# Patient Record
Sex: FEMALE | Race: WHITE | Employment: OTHER | URBAN - METROPOLITAN AREA
[De-identification: names, ages, dates, MRNs, and addresses within clinical notes are randomized per-mention and may not be internally consistent; named-entity substitution may affect disease eponyms.]

---

## 2021-09-22 ENCOUNTER — TRANSCRIBE ORDER (OUTPATIENT)
Dept: SCHEDULING | Age: 66
End: 2021-09-22

## 2021-09-22 DIAGNOSIS — M16.11 PRIMARY OSTEOARTHRITIS OF RIGHT HIP: Primary | ICD-10-CM

## 2022-01-03 ENCOUNTER — HOSPITAL ENCOUNTER (OUTPATIENT)
Dept: CT IMAGING | Age: 67
Discharge: HOME | End: 2022-01-03
Attending: ORTHOPAEDIC SURGERY
Payer: COMMERCIAL

## 2022-01-03 DIAGNOSIS — M16.11 PRIMARY OSTEOARTHRITIS OF RIGHT HIP: ICD-10-CM

## 2022-01-03 PROCEDURE — 73700 CT LOWER EXTREMITY W/O DYE: CPT

## 2022-01-24 ENCOUNTER — HOSPITAL ENCOUNTER (OUTPATIENT)
Dept: PREADMISSION TESTING | Age: 67
Discharge: HOME | End: 2022-01-24
Payer: COMMERCIAL

## 2022-01-24 VITALS
HEIGHT: 66 IN | HEART RATE: 100 BPM | TEMPERATURE: 97.7 F | DIASTOLIC BLOOD PRESSURE: 71 MMHG | RESPIRATION RATE: 20 BRPM | OXYGEN SATURATION: 99 % | SYSTOLIC BLOOD PRESSURE: 150 MMHG | WEIGHT: 195.99 LBS | BODY MASS INDEX: 31.5 KG/M2

## 2022-01-24 LAB
ABO + RH BLD: NORMAL
ALBUMIN SERPL-MCNC: 3.8 G/DL (ref 3.5–5)
ALBUMIN/GLOB SERPL: 1 {RATIO} (ref 1.1–2.2)
ALP SERPL-CCNC: 107 U/L (ref 45–117)
ALT SERPL-CCNC: 24 U/L (ref 12–78)
ANION GAP SERPL CALC-SCNC: 5 MMOL/L (ref 5–15)
APPEARANCE UR: ABNORMAL
AST SERPL-CCNC: 20 U/L (ref 15–37)
ATRIAL RATE: 115 BPM
BACTERIA URNS QL MICRO: NEGATIVE /HPF
BASOPHILS # BLD: 0 K/UL (ref 0–0.1)
BASOPHILS NFR BLD: 0 % (ref 0–1)
BILIRUB SERPL-MCNC: 0.3 MG/DL (ref 0.2–1)
BILIRUB UR QL: NEGATIVE
BLOOD GROUP ANTIBODIES SERPL: NORMAL
BUN SERPL-MCNC: 22 MG/DL (ref 6–20)
BUN/CREAT SERPL: 32 (ref 12–20)
CALCIUM SERPL-MCNC: 9.5 MG/DL (ref 8.5–10.1)
CALCULATED P AXIS, ECG09: 73 DEGREES
CALCULATED R AXIS, ECG10: 41 DEGREES
CALCULATED T AXIS, ECG11: 58 DEGREES
CHLORIDE SERPL-SCNC: 108 MMOL/L (ref 97–108)
CO2 SERPL-SCNC: 27 MMOL/L (ref 21–32)
COLOR UR: ABNORMAL
CREAT SERPL-MCNC: 0.68 MG/DL (ref 0.55–1.02)
CRP SERPL-MCNC: 0.77 MG/DL (ref 0–0.6)
DIAGNOSIS, 93000: NORMAL
DIFFERENTIAL METHOD BLD: ABNORMAL
EOSINOPHIL # BLD: 0 K/UL (ref 0–0.4)
EOSINOPHIL NFR BLD: 0 % (ref 0–7)
EPITH CASTS URNS QL MICRO: ABNORMAL /LPF
ERYTHROCYTE [DISTWIDTH] IN BLOOD BY AUTOMATED COUNT: 12.8 % (ref 11.5–14.5)
ERYTHROCYTE [SEDIMENTATION RATE] IN BLOOD: 10 MM/HR (ref 0–30)
EST. AVERAGE GLUCOSE BLD GHB EST-MCNC: 114 MG/DL
GLOBULIN SER CALC-MCNC: 3.7 G/DL (ref 2–4)
GLUCOSE SERPL-MCNC: 118 MG/DL (ref 65–100)
GLUCOSE UR STRIP.AUTO-MCNC: NEGATIVE MG/DL
HBA1C MFR BLD: 5.6 % (ref 4–5.6)
HCT VFR BLD AUTO: 37.7 % (ref 35–47)
HGB BLD-MCNC: 12.5 G/DL (ref 11.5–16)
HGB UR QL STRIP: NEGATIVE
IMM GRANULOCYTES # BLD AUTO: 0 K/UL (ref 0–0.04)
IMM GRANULOCYTES NFR BLD AUTO: 0 % (ref 0–0.5)
KETONES UR QL STRIP.AUTO: NEGATIVE MG/DL
LEUKOCYTE ESTERASE UR QL STRIP.AUTO: ABNORMAL
LYMPHOCYTES # BLD: 1.7 K/UL (ref 0.8–3.5)
LYMPHOCYTES NFR BLD: 20 % (ref 12–49)
MCH RBC QN AUTO: 30.9 PG (ref 26–34)
MCHC RBC AUTO-ENTMCNC: 33.2 G/DL (ref 30–36.5)
MCV RBC AUTO: 93.3 FL (ref 80–99)
MONOCYTES # BLD: 0.4 K/UL (ref 0–1)
MONOCYTES NFR BLD: 4 % (ref 5–13)
MUCOUS THREADS URNS QL MICRO: ABNORMAL /LPF
NEUTS SEG # BLD: 6.5 K/UL (ref 1.8–8)
NEUTS SEG NFR BLD: 76 % (ref 32–75)
NITRITE UR QL STRIP.AUTO: NEGATIVE
NRBC # BLD: 0 K/UL (ref 0–0.01)
NRBC BLD-RTO: 0 PER 100 WBC
P-R INTERVAL, ECG05: 160 MS
PH UR STRIP: 5.5 [PH] (ref 5–8)
PLATELET # BLD AUTO: 366 K/UL (ref 150–400)
PMV BLD AUTO: 9.9 FL (ref 8.9–12.9)
POTASSIUM SERPL-SCNC: 4.7 MMOL/L (ref 3.5–5.1)
PROT SERPL-MCNC: 7.5 G/DL (ref 6.4–8.2)
PROT UR STRIP-MCNC: NEGATIVE MG/DL
Q-T INTERVAL, ECG07: 314 MS
QRS DURATION, ECG06: 86 MS
QTC CALCULATION (BEZET), ECG08: 434 MS
RBC # BLD AUTO: 4.04 M/UL (ref 3.8–5.2)
RBC #/AREA URNS HPF: ABNORMAL /HPF (ref 0–5)
SODIUM SERPL-SCNC: 140 MMOL/L (ref 136–145)
SP GR UR REFRACTOMETRY: 1.02 (ref 1–1.03)
SPECIMEN EXP DATE BLD: NORMAL
UA: UC IF INDICATED,UAUC: ABNORMAL
UROBILINOGEN UR QL STRIP.AUTO: 0.2 EU/DL (ref 0.2–1)
VENTRICULAR RATE, ECG03: 115 BPM
WBC # BLD AUTO: 8.7 K/UL (ref 3.6–11)
WBC URNS QL MICRO: ABNORMAL /HPF (ref 0–4)

## 2022-01-24 PROCEDURE — 36415 COLL VENOUS BLD VENIPUNCTURE: CPT

## 2022-01-24 PROCEDURE — 86900 BLOOD TYPING SEROLOGIC ABO: CPT

## 2022-01-24 PROCEDURE — 85652 RBC SED RATE AUTOMATED: CPT

## 2022-01-24 PROCEDURE — 93005 ELECTROCARDIOGRAM TRACING: CPT

## 2022-01-24 PROCEDURE — 86140 C-REACTIVE PROTEIN: CPT

## 2022-01-24 PROCEDURE — 81001 URINALYSIS AUTO W/SCOPE: CPT

## 2022-01-24 PROCEDURE — 84466 ASSAY OF TRANSFERRIN: CPT

## 2022-01-24 PROCEDURE — 83036 HEMOGLOBIN GLYCOSYLATED A1C: CPT

## 2022-01-24 PROCEDURE — 80053 COMPREHEN METABOLIC PANEL: CPT

## 2022-01-24 PROCEDURE — 85025 COMPLETE CBC W/AUTO DIFF WBC: CPT

## 2022-01-24 RX ORDER — EFINACONAZOLE 100 MG/ML
SOLUTION TOPICAL DAILY
COMMUNITY
End: 2022-04-19 | Stop reason: ALTCHOICE

## 2022-01-24 RX ORDER — IBUPROFEN 600 MG/1
TABLET ORAL
COMMUNITY
End: 2022-01-31

## 2022-01-24 RX ORDER — BISMUTH SUBSALICYLATE 262 MG
1 TABLET,CHEWABLE ORAL DAILY
COMMUNITY

## 2022-01-24 NOTE — PERIOP NOTES
[address] , [address] [facility]   MAIN OR                                  [phone]   MAIN PRE OP                          [phone]                                                                                AMBULATORY PRE OP          [phone]  PRE-ADMISSION TESTING    [phone]   Surgery Date:  Monday 1/31/22        Is surgery arrival time given by surgeon? NO  If NO, [id number] staff will call you between 3 and 7pm the day before your surgery with your arrival time. (If your surgery is on a Monday, we will call you the Friday before.)    Call [phone] after 7pm Monday-Friday if you did not receive this call. INSTRUCTIONS BEFORE YOUR SURGERY   When You  Arrive Arrive at the [address] [facility] on the day of your surgery  Have your insurance card, photo ID, and any copayment (if needed)   Food   and   Drink NO food or drink after midnight the night before surgery    This means NO water, gum, mints, coffee, juice, etc.  No alcohol (beer, wine, liquor) 24 hours before and after surgery   Medications to   TAKE   Morning of Surgery MEDICATIONS TO TAKE THE MORNING OF SURGERY WITH A SIP OF WATER:    None    Medications  To  STOP      7 days before surgery  Non-Steroidal anti-inflammatory Drugs (NSAID's): for example, Ibuprofen (Advil, Motrin), Naproxen (Aleve)   Aspirin, if taking for pain    Herbal supplements, vitamins, and fish oil   Other:  (Pain medications not listed above, including Tylenol may be taken)   Blood  Thinners     Bathing Clothing  Jewelry  Valuables      If you shower the morning of surgery, please do not apply anything to your skin (lotions, powders, deodorant, or makeup, especially mascara)   Follow Chlor[employer] body wash instructions provided to you during PAT appointment. Begin 3 days prior to surgery.    Do not shave or trim anywhere 24 hours before surgery   Wear your hair loose or down; no pony-tails, buns, or metal hair clips   Wear loose, comfortable, clean clothes   Wear glasses instead of contacts  [address], valuables, and jewelry, including body piercings, at home   If you were given an [employer], bring it on day of surgery. Going Home - or Spending the Night  SAME-DAY SURGERY: You must have a responsible adult drive you home and stay with you 24 hours after surgery   ADMITS: If your doctor is keeping you in the hospital after surgery, leave personal belongings/luggage in your car until you have a hospital room number. Hospital discharge time is 12 noon  Drivers must be here before 12 noon unless you are told differently   Special Instructions It is now mandated that all surgical patients be tested for COVID-19 prior to surgery. Testing has to be exactly 4 days prior to surgery. Your COVID test date is  Wednesday 1/26/22 between 8:00 am and 11:00 am.       COVID testing will be performed curbside at the [address] [place] [address] Dr beltran. There will be signs leading you to the testing site. You will need to bring a photo ID with you to be swabbed. Patients are advised to self-quarantine at home after testing and prior to your surgery date. You will be notified if your results are positive.     What to watch for:   Coronavirus (COVID-19) affects different people in different ways   It also appears with a wide range of symptoms from mild to severe   Signs usually appear 2-14 days after exposure     If you develop any of the following, notify your doctor immediately:  o Fever  o Chills, with or without a shiver  o Muscle pain  o Headache  o Sore throat  o Dry cough  o New loss of taste or smell  o Tiredness      If you develop any of the following, call 911:  o Shortness of breath  o Difficulty breathing  o Chest pain  o New confusion  o Blueness of fingers and/or lips       Follow all instructions so your surgery wont be cancelled. Please, be on time. If a situation occurs and you are delayed the day of surgery, call [phone]. If your physical condition changes (like a fever, cold, flu, etc.) call your surgeon. Home medication(s) reviewed and verified via  LIST   VERBAL   during PAT appointment. The patient was contacted by    IN-PERSON    The patient verbalizes understanding of all instructions and   DOES NOT   need reinforcement.

## 2022-01-24 NOTE — H&P
History and Physical    Patient: Jarrett Arteaga MRN: [id number]  SSN: [id number]    YOB: 1955  Age: 75 y.o. Sex: female      CC: Right hip pain    Subjective:      Annalise Prieto is a 75 y.o. female referred for pre-operative evaluation by Dr. Trudi Torre for surgery on 1/31/22. Nani notes she has had right hip pain for several years. She went through PT for six months. Walking increases her pain. Pain is localized to her hip and groin. Denies numbness to leg. Has some buckling of her leg but denies falls. Sitting gives her some relief until she has to stand up. She has not been sleeping well r/t pain. The patient was evaluated in the surgeon's office and it was determined that the most appropriate plan of care is to proceed with surgical intervention. Patient's PCP Angelique Craig MD    Past Medical History:   Diagnosis Date    Anxiety     COVID-19 vaccine administered     boostered   Pfizer    White coat syndrome without diagnosis of hypertension      Past Surgical History:   Procedure Laterality Date    HX COLONOSCOPY  09/2021    HX DILATION AND CURETTAGE  1990      Family History   Problem Relation Age of Onset    Clotting Disorder Neg Hx      Social History     Tobacco Use    Smoking status: Never Smoker    Smokeless tobacco: Never Used   Substance Use Topics    Alcohol use: Yes     Comment: social    Drug use: Never      Prior to Admission medications    Medication Sig Start Date End Date Taking? Authorizing Provider   efinaconazole Ashleigh Mora) rosendo topical solution Apply  to affected area daily. Yes Provider, Historical   multivitamin (ONE A DAY) tablet Take 1 Tablet by mouth daily. Yes Provider, Historical   ibuprofen (MOTRIN) 600 mg tablet Take  by mouth every six (6) hours as needed for Pain.    Yes Provider, Historical        No Known Allergies    Review of Systems:  Constitutional: Negative for chills and fever  HENT: Negative for congestion and sore throat  Eyes: negative for blurred vision and double vision  Respiratory: Negative for cough, shortness of breath and wheezing  Mouth: Negative for loose, broken or chipped teeth. Cardiovascular: Negative for chest pain and palpitations  Gastrointestinal: Negative for abdominal pain, constipation, diarrhea and nausea  Genitourinary: Negative for dysuria and hematuria  Musculoskeletal: Right hip pain  Skin: Negative for rash, open wounds. Neurological: Negative for dizziness, tremors and headaches  Psychiatric: Anxiety    Objective:     Vitals:    01/24/22 1135   BP: (!) 150/71   Pulse: 100   Resp: 20   Temp: 97.7 °F (36.5 °C)   SpO2: 99%   Weight: 88.9 kg (195 lb 15.8 oz)   Height: 5' 6\" (1.676 m)      She has extreme white coat syndrome. She does not take anything for BP. She takes her BP regularly at home per her PCP's instructions.      Physical Exam:  General Appearance: Alert, Well Appearing and in no distress  Mental Status: Normal mood, behavior, speech and dress  Neck: Normal appearance externally  Ears: External canal no drainage  Nose: Normal external appearance and no drainage   Chest: Clear to auscultation, no wheezes, rales or rhonchi  Heart: Normal rate, regular rhythm, no murmurs, rubs, clicks or gallops  Skin: Normal color, no lesions externally  Abdomen: Not examined  Neuro: Not examined  Musculoskeletal: Gait antalgic    Recent Results (from the past 168 hour(s))   TYPE & SCREEN    Collection Time: 01/24/22 11:13 AM   Result Value Ref Range    Crossmatch Expiration 02/03/2022,2359     ABO/Rh(D) A POSITIVE     Antibody screen NEG    C REACTIVE PROTEIN, QT    Collection Time: 01/24/22 11:13 AM   Result Value Ref Range    C-Reactive protein 0.77 (H) 0.00 - 0.60 mg/dL   CBC WITH AUTOMATED DIFF    Collection Time: 01/24/22 11:13 AM   Result Value Ref Range    WBC 8.7 3.6 - 11.0 K/uL    RBC 4.04 3.80 - 5.20 M/uL    HGB 12.5 11.5 - 16.0 g/dL    HCT 37.7 35.0 - 47.0 %    MCV 93.3 80.0 - 99.0 FL    MCH 30.9 26.0 - 34.0 PG    MCHC 33.2 30.0 - 36.5 g/dL    RDW 12.8 11.5 - 14.5 %    PLATELET 575 132 - 206 K/uL    MPV 9.9 8.9 - 12.9 FL    NRBC 0.0 0  WBC    ABSOLUTE NRBC 0.00 0.00 - 0.01 K/uL    NEUTROPHILS 76 (H) 32 - 75 %    LYMPHOCYTES 20 12 - 49 %    MONOCYTES 4 (L) 5 - 13 %    EOSINOPHILS 0 0 - 7 %    BASOPHILS 0 0 - 1 %    IMMATURE GRANULOCYTES 0 0.0 - 0.5 %    ABS. NEUTROPHILS 6.5 1.8 - 8.0 K/UL    ABS. LYMPHOCYTES 1.7 0.8 - 3.5 K/UL    ABS. MONOCYTES 0.4 0.0 - 1.0 K/UL    ABS. EOSINOPHILS 0.0 0.0 - 0.4 K/UL    ABS. BASOPHILS 0.0 0.0 - 0.1 K/UL    ABS. IMM. GRANS. 0.0 0.00 - 0.04 K/UL    DF AUTOMATED     METABOLIC PANEL, COMPREHENSIVE    Collection Time: 01/24/22 11:13 AM   Result Value Ref Range    Sodium 140 136 - 145 mmol/L    Potassium 4.7 3.5 - 5.1 mmol/L    Chloride 108 97 - 108 mmol/L    CO2 27 21 - 32 mmol/L    Anion gap 5 5 - 15 mmol/L    Glucose 118 (H) 65 - 100 mg/dL    BUN 22 (H) 6 - 20 MG/DL    Creatinine 0.68 0.55 - 1.02 MG/DL    BUN/Creatinine ratio 32 (H) 12 - 20      GFR est AA >60 >60 ml/min/1.73m2    GFR est non-AA >60 >60 ml/min/1.73m2    Calcium 9.5 8.5 - 10.1 MG/DL    Bilirubin, total 0.3 0.2 - 1.0 MG/DL    ALT (SGPT) 24 12 - 78 U/L    AST (SGOT) 20 15 - 37 U/L    Alk. phosphatase 107 45 - 117 U/L    Protein, total 7.5 6.4 - 8.2 g/dL    Albumin 3.8 3.5 - 5.0 g/dL    Globulin 3.7 2.0 - 4.0 g/dL    A-G Ratio 1.0 (L) 1.1 - 2.2     HEMOGLOBIN A1C WITH EAG    Collection Time: 01/24/22 11:13 AM   Result Value Ref Range    Hemoglobin A1c 5.6 4.0 - 5.6 %    Est. average glucose 114 mg/dL   CULTURE, MRSA    Collection Time: 01/24/22 11:13 AM    Specimen: Nares; Nasal   Result Value Ref Range    Special Requests: NO SPECIAL REQUESTS      Culture result: MRSA NOT PRESENT      Culture result:        Screening of patient nares for MRSA is for surveillance purposes and, if positive, to facilitate isolation considerations in high risk settings.  It is not intended for automatic decolonization interventions per se as regimens are not sufficiently effective to warrant routine use. SED RATE (ESR)    Collection Time: 01/24/22 11:13 AM   Result Value Ref Range    Sed rate, automated 10 0 - 30 mm/hr   TRANSFERRIN    Collection Time: 01/24/22 11:13 AM   Result Value Ref Range    Transferrin 243 192 - 364 mg/dL   URINALYSIS W/ REFLEX CULTURE    Collection Time: 01/24/22 11:13 AM    Specimen: Urine   Result Value Ref Range    Color YELLOW/STRAW      Appearance CLOUDY (A) CLEAR      Specific gravity 1.019 1.003 - 1.030      pH (UA) 5.5 5.0 - 8.0      Protein Negative NEG mg/dL    Glucose Negative NEG mg/dL    Ketone Negative NEG mg/dL    Bilirubin Negative NEG      Blood Negative NEG      Urobilinogen 0.2 0.2 - 1.0 EU/dL    Nitrites Negative NEG      Leukocyte Esterase MODERATE (A) NEG      WBC 0-4 0 - 4 /hpf    RBC 0-5 0 - 5 /hpf    Epithelial cells FEW FEW /lpf    Bacteria Negative NEG /hpf    UA:UC IF INDICATED CULTURE NOT INDICATED BY UA RESULT CNI      Mucus 1+ (A) NEG /lpf   EKG, 12 LEAD, INITIAL    Collection Time: 01/24/22 11:38 AM   Result Value Ref Range    Ventricular Rate 115 BPM    Atrial Rate 115 BPM    P-R Interval 160 ms    QRS Duration 86 ms    Q-T Interval 314 ms    QTC Calculation (Bezet) 434 ms    Calculated P Axis 73 degrees    Calculated R Axis 41 degrees    Calculated T Axis 58 degrees    Diagnosis       Sinus tachycardia  Possible Left atrial enlargement  Poor R-wave Progression  Cannot rule out Anterior infarct , age undetermined  Abnormal ECG  No previous ECGs available  Confirmed by Levi Way MD., Angel De La Vega ([id number]) on 1/24/2022 4:32:36 PM           Assessment:     OA Right Hip  Pre-Operative Evaluation    Plan:     RTH  Labs and EKG reviewed.    MRSA negative      Signed By: Jan Westfall NP     January 26, 2022

## 2022-01-24 NOTE — H&P (VIEW-ONLY)
History and Physical    Patient: Álvaro Becerril MRN: [id number]  SSN: [id number]    YOB: 1955  Age: 75 y.o. Sex: female      CC: Right hip pain    Subjective:      Maykel Smalls is a 75 y.o. female referred for pre-operative evaluation by Dr. Elver Hernandez for surgery on 1/31/22. Nani notes she has had right hip pain for several years. She went through PT for six months. Walking increases her pain. Pain is localized to her hip and groin. Denies numbness to leg. Has some buckling of her leg but denies falls. Sitting gives her some relief until she has to stand up. She has not been sleeping well r/t pain. The patient was evaluated in the surgeon's office and it was determined that the most appropriate plan of care is to proceed with surgical intervention. Patient's PCP Eloy Montano MD    Past Medical History:   Diagnosis Date    Anxiety     COVID-19 vaccine administered     boostered   Pfizer    White coat syndrome without diagnosis of hypertension      Past Surgical History:   Procedure Laterality Date    HX COLONOSCOPY  09/2021    HX DILATION AND CURETTAGE  1990      Family History   Problem Relation Age of Onset    Clotting Disorder Neg Hx      Social History     Tobacco Use    Smoking status: Never Smoker    Smokeless tobacco: Never Used   Substance Use Topics    Alcohol use: Yes     Comment: social    Drug use: Never      Prior to Admission medications    Medication Sig Start Date End Date Taking? Authorizing Provider   efinaconazole Annalisa Bellefonte) rosendo topical solution Apply  to affected area daily. Yes Provider, Historical   multivitamin (ONE A DAY) tablet Take 1 Tablet by mouth daily. Yes Provider, Historical   ibuprofen (MOTRIN) 600 mg tablet Take  by mouth every six (6) hours as needed for Pain.    Yes Provider, Historical        No Known Allergies    Review of Systems:  Constitutional: Negative for chills and fever  HENT: Negative for congestion and sore throat  Eyes: negative for blurred vision and double vision  Respiratory: Negative for cough, shortness of breath and wheezing  Mouth: Negative for loose, broken or chipped teeth. Cardiovascular: Negative for chest pain and palpitations  Gastrointestinal: Negative for abdominal pain, constipation, diarrhea and nausea  Genitourinary: Negative for dysuria and hematuria  Musculoskeletal: Right hip pain  Skin: Negative for rash, open wounds. Neurological: Negative for dizziness, tremors and headaches  Psychiatric: Anxiety    Objective:     Vitals:    01/24/22 1135   BP: (!) 150/71   Pulse: 100   Resp: 20   Temp: 97.7 °F (36.5 °C)   SpO2: 99%   Weight: 88.9 kg (195 lb 15.8 oz)   Height: 5' 6\" (1.676 m)      She has extreme white coat syndrome. She does not take anything for BP. She takes her BP regularly at home per her PCP's instructions.      Physical Exam:  General Appearance: Alert, Well Appearing and in no distress  Mental Status: Normal mood, behavior, speech and dress  Neck: Normal appearance externally  Ears: External canal no drainage  Nose: Normal external appearance and no drainage   Chest: Clear to auscultation, no wheezes, rales or rhonchi  Heart: Normal rate, regular rhythm, no murmurs, rubs, clicks or gallops  Skin: Normal color, no lesions externally  Abdomen: Not examined  Neuro: Not examined  Musculoskeletal: Gait antalgic    Recent Results (from the past 168 hour(s))   TYPE & SCREEN    Collection Time: 01/24/22 11:13 AM   Result Value Ref Range    Crossmatch Expiration 02/03/2022,2359     ABO/Rh(D) A POSITIVE     Antibody screen NEG    C REACTIVE PROTEIN, QT    Collection Time: 01/24/22 11:13 AM   Result Value Ref Range    C-Reactive protein 0.77 (H) 0.00 - 0.60 mg/dL   CBC WITH AUTOMATED DIFF    Collection Time: 01/24/22 11:13 AM   Result Value Ref Range    WBC 8.7 3.6 - 11.0 K/uL    RBC 4.04 3.80 - 5.20 M/uL    HGB 12.5 11.5 - 16.0 g/dL    HCT 37.7 35.0 - 47.0 %    MCV 93.3 80.0 - 99.0 FL    MCH 30.9 26.0 - 34.0 PG    MCHC 33.2 30.0 - 36.5 g/dL    RDW 12.8 11.5 - 14.5 %    PLATELET 495 352 - 754 K/uL    MPV 9.9 8.9 - 12.9 FL    NRBC 0.0 0  WBC    ABSOLUTE NRBC 0.00 0.00 - 0.01 K/uL    NEUTROPHILS 76 (H) 32 - 75 %    LYMPHOCYTES 20 12 - 49 %    MONOCYTES 4 (L) 5 - 13 %    EOSINOPHILS 0 0 - 7 %    BASOPHILS 0 0 - 1 %    IMMATURE GRANULOCYTES 0 0.0 - 0.5 %    ABS. NEUTROPHILS 6.5 1.8 - 8.0 K/UL    ABS. LYMPHOCYTES 1.7 0.8 - 3.5 K/UL    ABS. MONOCYTES 0.4 0.0 - 1.0 K/UL    ABS. EOSINOPHILS 0.0 0.0 - 0.4 K/UL    ABS. BASOPHILS 0.0 0.0 - 0.1 K/UL    ABS. IMM. GRANS. 0.0 0.00 - 0.04 K/UL    DF AUTOMATED     METABOLIC PANEL, COMPREHENSIVE    Collection Time: 01/24/22 11:13 AM   Result Value Ref Range    Sodium 140 136 - 145 mmol/L    Potassium 4.7 3.5 - 5.1 mmol/L    Chloride 108 97 - 108 mmol/L    CO2 27 21 - 32 mmol/L    Anion gap 5 5 - 15 mmol/L    Glucose 118 (H) 65 - 100 mg/dL    BUN 22 (H) 6 - 20 MG/DL    Creatinine 0.68 0.55 - 1.02 MG/DL    BUN/Creatinine ratio 32 (H) 12 - 20      GFR est AA >60 >60 ml/min/1.73m2    GFR est non-AA >60 >60 ml/min/1.73m2    Calcium 9.5 8.5 - 10.1 MG/DL    Bilirubin, total 0.3 0.2 - 1.0 MG/DL    ALT (SGPT) 24 12 - 78 U/L    AST (SGOT) 20 15 - 37 U/L    Alk. phosphatase 107 45 - 117 U/L    Protein, total 7.5 6.4 - 8.2 g/dL    Albumin 3.8 3.5 - 5.0 g/dL    Globulin 3.7 2.0 - 4.0 g/dL    A-G Ratio 1.0 (L) 1.1 - 2.2     HEMOGLOBIN A1C WITH EAG    Collection Time: 01/24/22 11:13 AM   Result Value Ref Range    Hemoglobin A1c 5.6 4.0 - 5.6 %    Est. average glucose 114 mg/dL   CULTURE, MRSA    Collection Time: 01/24/22 11:13 AM    Specimen: Nares; Nasal   Result Value Ref Range    Special Requests: NO SPECIAL REQUESTS      Culture result: MRSA NOT PRESENT      Culture result:        Screening of patient nares for MRSA is for surveillance purposes and, if positive, to facilitate isolation considerations in high risk settings.  It is not intended for automatic decolonization interventions per se as regimens are not sufficiently effective to warrant routine use. SED RATE (ESR)    Collection Time: 01/24/22 11:13 AM   Result Value Ref Range    Sed rate, automated 10 0 - 30 mm/hr   TRANSFERRIN    Collection Time: 01/24/22 11:13 AM   Result Value Ref Range    Transferrin 243 192 - 364 mg/dL   URINALYSIS W/ REFLEX CULTURE    Collection Time: 01/24/22 11:13 AM    Specimen: Urine   Result Value Ref Range    Color YELLOW/STRAW      Appearance CLOUDY (A) CLEAR      Specific gravity 1.019 1.003 - 1.030      pH (UA) 5.5 5.0 - 8.0      Protein Negative NEG mg/dL    Glucose Negative NEG mg/dL    Ketone Negative NEG mg/dL    Bilirubin Negative NEG      Blood Negative NEG      Urobilinogen 0.2 0.2 - 1.0 EU/dL    Nitrites Negative NEG      Leukocyte Esterase MODERATE (A) NEG      WBC 0-4 0 - 4 /hpf    RBC 0-5 0 - 5 /hpf    Epithelial cells FEW FEW /lpf    Bacteria Negative NEG /hpf    UA:UC IF INDICATED CULTURE NOT INDICATED BY UA RESULT CNI      Mucus 1+ (A) NEG /lpf   EKG, 12 LEAD, INITIAL    Collection Time: 01/24/22 11:38 AM   Result Value Ref Range    Ventricular Rate 115 BPM    Atrial Rate 115 BPM    P-R Interval 160 ms    QRS Duration 86 ms    Q-T Interval 314 ms    QTC Calculation (Bezet) 434 ms    Calculated P Axis 73 degrees    Calculated R Axis 41 degrees    Calculated T Axis 58 degrees    Diagnosis       Sinus tachycardia  Possible Left atrial enlargement  Poor R-wave Progression  Cannot rule out Anterior infarct , age undetermined  Abnormal ECG  No previous ECGs available  Confirmed by Arpita Grant MD., Courtney Singh ([id number]) on 1/24/2022 4:32:36 PM           Assessment:     OA Right Hip  Pre-Operative Evaluation    Plan:     RTH  Labs and EKG reviewed.    MRSA negative      Signed By: Alix Lira NP     January 26, 2022

## 2022-01-25 LAB
BACTERIA SPEC CULT: NORMAL
BACTERIA SPEC CULT: NORMAL
SERVICE CMNT-IMP: NORMAL
TRANSFERRIN SERPL-MCNC: 243 MG/DL (ref 192–364)

## 2022-01-26 ENCOUNTER — TRANSCRIBE ORDER (OUTPATIENT)
Dept: REGISTRATION | Age: 67
End: 2022-01-26

## 2022-01-26 ENCOUNTER — HOSPITAL ENCOUNTER (OUTPATIENT)
Dept: PREADMISSION TESTING | Age: 67
Discharge: HOME | End: 2022-01-26
Payer: COMMERCIAL

## 2022-01-26 LAB
SARS-COV-2, XPLCVT: NOT DETECTED
SOURCE, COVRS: NORMAL

## 2022-01-26 PROCEDURE — U0005 INFEC AGEN DETEC AMPLI PROBE: HCPCS

## 2022-01-26 NOTE — PROGRESS NOTES
The patient was provided a virtual link to view the pre-operative Joint Replacement Class Video  A Patient Education Book specific to total hip or knee joint replacement surgery was given to the patient in [facility]. The content of the class was presented using an audio power point presentation specific for patients undergoing total hip and knee replacement surgery. Incentive spirometer and CHG bath kits were verbally reviewed. Day of surgery routine and expectations, hospital routine and expectations, nutrition, alcohol, nicotine, medications, infection control, pain management, DVT precautions and equipment, ice therapy, durable medical equipment, exercises, mobility expectations and precautions, home preparation and safety were reviewed in class video. My contact information was shared with the patient to provide further information as requested by the patient related to their upcoming surgery. Patient sent email confirmation that they viewed the joint class video.

## 2022-01-28 ENCOUNTER — ANESTHESIA EVENT (OUTPATIENT)
Dept: SURGERY | Age: 67
End: 2022-01-28
Payer: COMMERCIAL

## 2022-01-30 RX ORDER — CELECOXIB 100 MG/1
200 CAPSULE ORAL 2 TIMES DAILY
Status: CANCELLED | OUTPATIENT
Start: 2022-01-30

## 2022-01-30 RX ORDER — AMOXICILLIN 250 MG
1 CAPSULE ORAL 2 TIMES DAILY
Status: CANCELLED | OUTPATIENT
Start: 2022-01-30

## 2022-01-30 RX ORDER — OXYCODONE HYDROCHLORIDE 5 MG/1
10 TABLET ORAL
Status: CANCELLED | OUTPATIENT
Start: 2022-01-30

## 2022-01-30 RX ORDER — ACETAMINOPHEN 325 MG/1
650 TABLET ORAL EVERY 6 HOURS
Status: CANCELLED | OUTPATIENT
Start: 2022-01-31

## 2022-01-30 RX ORDER — SODIUM CHLORIDE 9 MG/ML
125 INJECTION, SOLUTION INTRAVENOUS CONTINUOUS
Status: CANCELLED | OUTPATIENT
Start: 2022-01-30 | End: 2022-01-31

## 2022-01-30 RX ORDER — ONDANSETRON 2 MG/ML
4 INJECTION INTRAMUSCULAR; INTRAVENOUS
Status: CANCELLED | OUTPATIENT
Start: 2022-01-30 | End: 2022-01-31

## 2022-01-30 RX ORDER — POLYETHYLENE GLYCOL 3350 17 G/17G
17 POWDER, FOR SOLUTION ORAL DAILY
Status: CANCELLED | OUTPATIENT
Start: 2022-01-31

## 2022-01-30 RX ORDER — BISMUTH SUBSALICYLATE 262 MG
1 TABLET,CHEWABLE ORAL DAILY
Status: CANCELLED | OUTPATIENT
Start: 2022-01-31

## 2022-01-30 RX ORDER — DIPHENHYDRAMINE HYDROCHLORIDE 50 MG/ML
12.5 INJECTION, SOLUTION INTRAMUSCULAR; INTRAVENOUS
Status: CANCELLED | OUTPATIENT
Start: 2022-01-30 | End: 2022-01-31

## 2022-01-30 RX ORDER — ASPIRIN 81 MG/1
81 TABLET ORAL 2 TIMES DAILY
Status: CANCELLED | OUTPATIENT
Start: 2022-01-30

## 2022-01-30 RX ORDER — SODIUM CHLORIDE 0.9 % (FLUSH) 0.9 %
5-40 SYRINGE (ML) INJECTION EVERY 8 HOURS
Status: CANCELLED | OUTPATIENT
Start: 2022-01-30

## 2022-01-30 RX ORDER — PREGABALIN 75 MG/1
75 CAPSULE ORAL
Status: CANCELLED | OUTPATIENT
Start: 2022-01-30

## 2022-01-30 RX ORDER — OXYCODONE HYDROCHLORIDE 5 MG/1
5 TABLET ORAL
Status: CANCELLED | OUTPATIENT
Start: 2022-01-30

## 2022-01-30 RX ORDER — SODIUM CHLORIDE 0.9 % (FLUSH) 0.9 %
5-40 SYRINGE (ML) INJECTION AS NEEDED
Status: CANCELLED | OUTPATIENT
Start: 2022-01-30

## 2022-01-30 RX ORDER — HYDROMORPHONE HYDROCHLORIDE 1 MG/ML
0.5 INJECTION, SOLUTION INTRAMUSCULAR; INTRAVENOUS; SUBCUTANEOUS
Status: CANCELLED | OUTPATIENT
Start: 2022-01-30 | End: 2022-01-31

## 2022-01-30 RX ORDER — FACIAL-BODY WIPES
10 EACH TOPICAL DAILY PRN
Status: CANCELLED | OUTPATIENT
Start: 2022-02-01

## 2022-01-30 RX ORDER — NALOXONE HYDROCHLORIDE 0.4 MG/ML
0.4 INJECTION, SOLUTION INTRAMUSCULAR; INTRAVENOUS; SUBCUTANEOUS AS NEEDED
Status: CANCELLED | OUTPATIENT
Start: 2022-01-30

## 2022-01-30 RX ORDER — FAMOTIDINE 20 MG/1
20 TABLET, FILM COATED ORAL 2 TIMES DAILY
Status: CANCELLED | OUTPATIENT
Start: 2022-01-30

## 2022-01-31 ENCOUNTER — APPOINTMENT (OUTPATIENT)
Dept: GENERAL RADIOLOGY | Age: 67
End: 2022-01-31
Attending: PHYSICIAN ASSISTANT
Payer: COMMERCIAL

## 2022-01-31 ENCOUNTER — HOSPITAL ENCOUNTER (OUTPATIENT)
Age: 67
Setting detail: OUTPATIENT SURGERY
Discharge: HOME | End: 2022-01-31
Attending: ORTHOPAEDIC SURGERY | Admitting: ORTHOPAEDIC SURGERY
Payer: COMMERCIAL

## 2022-01-31 ENCOUNTER — ANESTHESIA (OUTPATIENT)
Dept: SURGERY | Age: 67
End: 2022-01-31
Payer: COMMERCIAL

## 2022-01-31 VITALS
SYSTOLIC BLOOD PRESSURE: 131 MMHG | OXYGEN SATURATION: 100 % | TEMPERATURE: 97.7 F | HEIGHT: 66 IN | RESPIRATION RATE: 21 BRPM | WEIGHT: 194.67 LBS | HEART RATE: 107 BPM | DIASTOLIC BLOOD PRESSURE: 64 MMHG | BODY MASS INDEX: 31.29 KG/M2

## 2022-01-31 DIAGNOSIS — M16.11 PRIMARY OSTEOARTHRITIS OF RIGHT HIP: Primary | ICD-10-CM

## 2022-01-31 PROCEDURE — 77030031139 HC SUT VCRL2 J&J -A: Performed by: ORTHOPAEDIC SURGERY

## 2022-01-31 PROCEDURE — G0378 HOSPITAL OBSERVATION PER HR: HCPCS

## 2022-01-31 PROCEDURE — C1776 JOINT DEVICE (IMPLANTABLE): HCPCS | Performed by: ORTHOPAEDIC SURGERY

## 2022-01-31 PROCEDURE — 77030010507 HC ADH SKN DERMBND J&J -B: Performed by: ORTHOPAEDIC SURGERY

## 2022-01-31 PROCEDURE — 76210000050 HC AMBSU PH II REC 0.5 TO 1 HR: Performed by: ORTHOPAEDIC SURGERY

## 2022-01-31 PROCEDURE — 74011250636 HC RX REV CODE- 250/636: Performed by: NURSE ANESTHETIST, CERTIFIED REGISTERED

## 2022-01-31 PROCEDURE — 97161 PT EVAL LOW COMPLEX 20 MIN: CPT

## 2022-01-31 PROCEDURE — 74011000250 HC RX REV CODE- 250: Performed by: ORTHOPAEDIC SURGERY

## 2022-01-31 PROCEDURE — 77030040922 HC BLNKT HYPOTHRM STRY -A

## 2022-01-31 PROCEDURE — 77030035236 HC SUT PDS STRATFX BARB J&J -B: Performed by: ORTHOPAEDIC SURGERY

## 2022-01-31 PROCEDURE — 74011250637 HC RX REV CODE- 250/637: Performed by: PHYSICIAN ASSISTANT

## 2022-01-31 PROCEDURE — 74011000250 HC RX REV CODE- 250: Performed by: NURSE ANESTHETIST, CERTIFIED REGISTERED

## 2022-01-31 PROCEDURE — 77030006835 HC BLD SAW SAG STRY -B: Performed by: ORTHOPAEDIC SURGERY

## 2022-01-31 PROCEDURE — 77030038023 HC PIN FIX MAKO STRY -B: Performed by: ORTHOPAEDIC SURGERY

## 2022-01-31 PROCEDURE — 76030000021 HC AMB SURG 2 TO 2.5 HR INTENSV-TIER 1: Performed by: ORTHOPAEDIC SURGERY

## 2022-01-31 PROCEDURE — 77030002933 HC SUT MCRYL J&J -A: Performed by: ORTHOPAEDIC SURGERY

## 2022-01-31 PROCEDURE — 77030020788: Performed by: ORTHOPAEDIC SURGERY

## 2022-01-31 PROCEDURE — 77030020143 HC AIRWY LARYN INTUB CGAS -A: Performed by: STUDENT IN AN ORGANIZED HEALTH CARE EDUCATION/TRAINING PROGRAM

## 2022-01-31 PROCEDURE — 74011250636 HC RX REV CODE- 250/636: Performed by: ANESTHESIOLOGY

## 2022-01-31 PROCEDURE — 74011000258 HC RX REV CODE- 258: Performed by: NURSE ANESTHETIST, CERTIFIED REGISTERED

## 2022-01-31 PROCEDURE — 74011250636 HC RX REV CODE- 250/636: Performed by: PHYSICIAN ASSISTANT

## 2022-01-31 PROCEDURE — 74011250637 HC RX REV CODE- 250/637: Performed by: ANESTHESIOLOGY

## 2022-01-31 PROCEDURE — 72170 X-RAY EXAM OF PELVIS: CPT

## 2022-01-31 PROCEDURE — 77030036660

## 2022-01-31 PROCEDURE — 2709999900 HC NON-CHARGEABLE SUPPLY: Performed by: ORTHOPAEDIC SURGERY

## 2022-01-31 PROCEDURE — 76210000038 HC AMBSU PH I REC 2.5 TO 3 HR: Performed by: ORTHOPAEDIC SURGERY

## 2022-01-31 PROCEDURE — 77030041075 HC DRSG AG OPTIFRM MDII -B: Performed by: ORTHOPAEDIC SURGERY

## 2022-01-31 PROCEDURE — 76060000064 HC AMB SURG ANES 2 TO 2.5 HR: Performed by: ORTHOPAEDIC SURGERY

## 2022-01-31 PROCEDURE — 77030040361 HC SLV COMPR DVT MDII -B

## 2022-01-31 PROCEDURE — 74011000250 HC RX REV CODE- 250: Performed by: PHYSICIAN ASSISTANT

## 2022-01-31 PROCEDURE — 97116 GAIT TRAINING THERAPY: CPT | Performed by: PHYSICAL THERAPIST

## 2022-01-31 PROCEDURE — 77030029821: Performed by: ORTHOPAEDIC SURGERY

## 2022-01-31 PROCEDURE — 77030018723 HC ELCTRD BLD COVD -A: Performed by: ORTHOPAEDIC SURGERY

## 2022-01-31 PROCEDURE — 77030029829 HC TIB INST CKPNT DISP STRY -B: Performed by: ORTHOPAEDIC SURGERY

## 2022-01-31 DEVICE — CERAMIC V40 FEMORAL HEAD
Type: IMPLANTABLE DEVICE | Site: HIP | Status: FUNCTIONAL
Brand: BIOLOX

## 2022-01-31 DEVICE — 127 DEGREE NECK ANGLE HIP STEM
Type: IMPLANTABLE DEVICE | Site: HIP | Status: FUNCTIONAL
Brand: ACCOLADE

## 2022-01-31 DEVICE — HIP H2 TOT ADV OTHER HD -- IMPL CAPPED H2: Type: IMPLANTABLE DEVICE | Status: FUNCTIONAL

## 2022-01-31 DEVICE — TRIDENT II TRITANIUM CLUSTER 52E
Type: IMPLANTABLE DEVICE | Site: HIP | Status: FUNCTIONAL
Brand: TRIDENT II

## 2022-01-31 DEVICE — TRIDENT X3 0 DEGREE POLYETHYLENE INSERT
Type: IMPLANTABLE DEVICE | Site: HIP | Status: FUNCTIONAL
Brand: TRIDENT X3 INSERT

## 2022-01-31 RX ORDER — OXYCODONE HYDROCHLORIDE 5 MG/1
5 TABLET ORAL
Qty: 30 TABLET | Refills: 0 | Status: SHIPPED | OUTPATIENT
Start: 2022-01-31 | End: 2022-02-17

## 2022-01-31 RX ORDER — ACETAMINOPHEN 325 MG/1
975 TABLET ORAL ONCE
Status: COMPLETED | OUTPATIENT
Start: 2022-01-31 | End: 2022-01-31

## 2022-01-31 RX ORDER — OXYCODONE HYDROCHLORIDE 5 MG/1
10 TABLET ORAL
Status: COMPLETED | OUTPATIENT
Start: 2022-01-31 | End: 2022-01-31

## 2022-01-31 RX ORDER — PHENYLEPHRINE HCL IN 0.9% NACL 0.4MG/10ML
SYRINGE (ML) INTRAVENOUS AS NEEDED
Status: DISCONTINUED | OUTPATIENT
Start: 2022-01-31 | End: 2022-01-31 | Stop reason: HOSPADM

## 2022-01-31 RX ORDER — PREGABALIN 75 MG/1
75 CAPSULE ORAL ONCE
Status: COMPLETED | OUTPATIENT
Start: 2022-01-31 | End: 2022-01-31

## 2022-01-31 RX ORDER — ACETAMINOPHEN 500 MG
975 TABLET ORAL
Qty: 60 TABLET | Refills: 1 | Status: SHIPPED | OUTPATIENT
Start: 2022-01-31 | End: 2022-04-19

## 2022-01-31 RX ORDER — SODIUM CHLORIDE, SODIUM LACTATE, POTASSIUM CHLORIDE, CALCIUM CHLORIDE 600; 310; 30; 20 MG/100ML; MG/100ML; MG/100ML; MG/100ML
125 INJECTION, SOLUTION INTRAVENOUS CONTINUOUS
Status: DISCONTINUED | OUTPATIENT
Start: 2022-01-31 | End: 2022-01-31 | Stop reason: HOSPADM

## 2022-01-31 RX ORDER — FLUMAZENIL 0.1 MG/ML
0.2 INJECTION INTRAVENOUS
Status: DISCONTINUED | OUTPATIENT
Start: 2022-01-31 | End: 2022-01-31 | Stop reason: HOSPADM

## 2022-01-31 RX ORDER — KETOROLAC TROMETHAMINE 30 MG/ML
15 INJECTION, SOLUTION INTRAMUSCULAR; INTRAVENOUS
Status: DISCONTINUED | OUTPATIENT
Start: 2022-01-31 | End: 2022-01-31 | Stop reason: HOSPADM

## 2022-01-31 RX ORDER — FENTANYL CITRATE 50 UG/ML
INJECTION, SOLUTION INTRAMUSCULAR; INTRAVENOUS AS NEEDED
Status: DISCONTINUED | OUTPATIENT
Start: 2022-01-31 | End: 2022-01-31 | Stop reason: HOSPADM

## 2022-01-31 RX ORDER — KETAMINE HYDROCHLORIDE 10 MG/ML
INJECTION, SOLUTION INTRAMUSCULAR; INTRAVENOUS AS NEEDED
Status: DISCONTINUED | OUTPATIENT
Start: 2022-01-31 | End: 2022-01-31 | Stop reason: HOSPADM

## 2022-01-31 RX ORDER — NALOXONE HYDROCHLORIDE 0.4 MG/ML
0.2 INJECTION, SOLUTION INTRAMUSCULAR; INTRAVENOUS; SUBCUTANEOUS
Status: DISCONTINUED | OUTPATIENT
Start: 2022-01-31 | End: 2022-01-31 | Stop reason: HOSPADM

## 2022-01-31 RX ORDER — IBUPROFEN 800 MG/1
800 TABLET ORAL
Qty: 50 TABLET | Refills: 2 | Status: SHIPPED | OUTPATIENT
Start: 2022-01-31 | End: 2022-04-19

## 2022-01-31 RX ORDER — LIDOCAINE HYDROCHLORIDE 10 MG/ML
0.1 INJECTION, SOLUTION EPIDURAL; INFILTRATION; INTRACAUDAL; PERINEURAL AS NEEDED
Status: DISCONTINUED | OUTPATIENT
Start: 2022-01-31 | End: 2022-01-31 | Stop reason: HOSPADM

## 2022-01-31 RX ORDER — EPHEDRINE SULFATE/0.9% NACL/PF 50 MG/5 ML
SYRINGE (ML) INTRAVENOUS AS NEEDED
Status: DISCONTINUED | OUTPATIENT
Start: 2022-01-31 | End: 2022-01-31 | Stop reason: HOSPADM

## 2022-01-31 RX ORDER — ONDANSETRON 2 MG/ML
INJECTION INTRAMUSCULAR; INTRAVENOUS AS NEEDED
Status: DISCONTINUED | OUTPATIENT
Start: 2022-01-31 | End: 2022-01-31 | Stop reason: HOSPADM

## 2022-01-31 RX ORDER — DIPHENHYDRAMINE HYDROCHLORIDE 50 MG/ML
12.5 INJECTION, SOLUTION INTRAMUSCULAR; INTRAVENOUS AS NEEDED
Status: DISCONTINUED | OUTPATIENT
Start: 2022-01-31 | End: 2022-01-31 | Stop reason: HOSPADM

## 2022-01-31 RX ORDER — OXYCODONE HCL 10 MG/1
10 TABLET, FILM COATED, EXTENDED RELEASE ORAL ONCE
Status: COMPLETED | OUTPATIENT
Start: 2022-01-31 | End: 2022-01-31

## 2022-01-31 RX ORDER — HYDROMORPHONE HYDROCHLORIDE 1 MG/ML
.25-1 INJECTION, SOLUTION INTRAMUSCULAR; INTRAVENOUS; SUBCUTANEOUS
Status: DISCONTINUED | OUTPATIENT
Start: 2022-01-31 | End: 2022-01-31 | Stop reason: HOSPADM

## 2022-01-31 RX ORDER — ONDANSETRON 8 MG/1
4 TABLET, ORALLY DISINTEGRATING ORAL
Qty: 30 TABLET | Refills: 0 | Status: SHIPPED | OUTPATIENT
Start: 2022-01-31 | End: 2022-04-19

## 2022-01-31 RX ORDER — FENTANYL CITRATE 50 UG/ML
25 INJECTION, SOLUTION INTRAMUSCULAR; INTRAVENOUS
Status: DISCONTINUED | OUTPATIENT
Start: 2022-01-31 | End: 2022-01-31 | Stop reason: HOSPADM

## 2022-01-31 RX ORDER — TRAMADOL HYDROCHLORIDE 50 MG/1
50 TABLET ORAL
Qty: 28 TABLET | Refills: 0 | Status: SHIPPED | OUTPATIENT
Start: 2022-01-31 | End: 2022-02-17

## 2022-01-31 RX ORDER — ASPIRIN 81 MG/1
81 TABLET ORAL 2 TIMES DAILY
Qty: 60 TABLET | Refills: 0 | Status: SHIPPED | OUTPATIENT
Start: 2022-01-31 | End: 2022-04-19

## 2022-01-31 RX ORDER — POVIDONE-IODINE 10 %
SOLUTION, NON-ORAL TOPICAL AS NEEDED
Status: DISCONTINUED | OUTPATIENT
Start: 2022-01-31 | End: 2022-01-31 | Stop reason: HOSPADM

## 2022-01-31 RX ORDER — MIDAZOLAM HYDROCHLORIDE 1 MG/ML
INJECTION, SOLUTION INTRAMUSCULAR; INTRAVENOUS AS NEEDED
Status: DISCONTINUED | OUTPATIENT
Start: 2022-01-31 | End: 2022-01-31 | Stop reason: HOSPADM

## 2022-01-31 RX ORDER — DEXAMETHASONE SODIUM PHOSPHATE 4 MG/ML
INJECTION, SOLUTION INTRA-ARTICULAR; INTRALESIONAL; INTRAMUSCULAR; INTRAVENOUS; SOFT TISSUE AS NEEDED
Status: DISCONTINUED | OUTPATIENT
Start: 2022-01-31 | End: 2022-01-31 | Stop reason: HOSPADM

## 2022-01-31 RX ORDER — PROPOFOL 10 MG/ML
INJECTION, EMULSION INTRAVENOUS AS NEEDED
Status: DISCONTINUED | OUTPATIENT
Start: 2022-01-31 | End: 2022-01-31 | Stop reason: HOSPADM

## 2022-01-31 RX ORDER — CELECOXIB 100 MG/1
100 CAPSULE ORAL ONCE
Status: COMPLETED | OUTPATIENT
Start: 2022-01-31 | End: 2022-01-31

## 2022-01-31 RX ORDER — LIDOCAINE HYDROCHLORIDE 20 MG/ML
INJECTION, SOLUTION EPIDURAL; INFILTRATION; INTRACAUDAL; PERINEURAL AS NEEDED
Status: DISCONTINUED | OUTPATIENT
Start: 2022-01-31 | End: 2022-01-31 | Stop reason: HOSPADM

## 2022-01-31 RX ADMIN — DEXAMETHASONE SODIUM PHOSPHATE 10 MG: 4 INJECTION, SOLUTION INTRAMUSCULAR; INTRAVENOUS at 09:02

## 2022-01-31 RX ADMIN — Medication 120 MCG: at 08:16

## 2022-01-31 RX ADMIN — PREGABALIN 75 MG: 75 CAPSULE ORAL at 06:02

## 2022-01-31 RX ADMIN — MIDAZOLAM 2 MG: 1 INJECTION, SOLUTION INTRAMUSCULAR; INTRAVENOUS at 07:26

## 2022-01-31 RX ADMIN — PROPOFOL 50 MG: 10 INJECTION, EMULSION INTRAVENOUS at 07:53

## 2022-01-31 RX ADMIN — Medication 80 MCG: at 08:05

## 2022-01-31 RX ADMIN — Medication 10 MG: at 07:58

## 2022-01-31 RX ADMIN — LIDOCAINE HYDROCHLORIDE 40 MG: 20 INJECTION, SOLUTION INTRAVENOUS at 07:34

## 2022-01-31 RX ADMIN — ONDANSETRON HYDROCHLORIDE 4 MG: 2 SOLUTION INTRAMUSCULAR; INTRAVENOUS at 09:02

## 2022-01-31 RX ADMIN — CEFAZOLIN SODIUM 2 G: 1 POWDER, FOR SOLUTION INTRAMUSCULAR; INTRAVENOUS at 07:38

## 2022-01-31 RX ADMIN — Medication 80 MCG: at 07:47

## 2022-01-31 RX ADMIN — SODIUM CHLORIDE 1 G: 9 INJECTION, SOLUTION INTRAVENOUS at 07:40

## 2022-01-31 RX ADMIN — FENTANYL CITRATE 25 MCG: 50 INJECTION INTRAMUSCULAR; INTRAVENOUS at 08:14

## 2022-01-31 RX ADMIN — SODIUM CHLORIDE, POTASSIUM CHLORIDE, SODIUM LACTATE AND CALCIUM CHLORIDE: 600; 310; 30; 20 INJECTION, SOLUTION INTRAVENOUS at 08:56

## 2022-01-31 RX ADMIN — KETAMINE HYDROCHLORIDE 10 MG: 10 INJECTION INTRAMUSCULAR; INTRAVENOUS at 08:49

## 2022-01-31 RX ADMIN — Medication 80 MCG: at 09:21

## 2022-01-31 RX ADMIN — PROPOFOL 200 MG: 10 INJECTION, EMULSION INTRAVENOUS at 07:34

## 2022-01-31 RX ADMIN — Medication 10 MG: at 08:53

## 2022-01-31 RX ADMIN — SODIUM CHLORIDE, POTASSIUM CHLORIDE, SODIUM LACTATE AND CALCIUM CHLORIDE: 600; 310; 30; 20 INJECTION, SOLUTION INTRAVENOUS at 07:47

## 2022-01-31 RX ADMIN — Medication 120 MCG: at 08:24

## 2022-01-31 RX ADMIN — SODIUM CHLORIDE, POTASSIUM CHLORIDE, SODIUM LACTATE AND CALCIUM CHLORIDE 125 ML/HR: 600; 310; 30; 20 INJECTION, SOLUTION INTRAVENOUS at 06:17

## 2022-01-31 RX ADMIN — OXYCODONE HYDROCHLORIDE 10 MG: 10 TABLET, FILM COATED, EXTENDED RELEASE ORAL at 06:03

## 2022-01-31 RX ADMIN — Medication 10 MG: at 07:51

## 2022-01-31 RX ADMIN — FENTANYL CITRATE 50 MCG: 50 INJECTION INTRAMUSCULAR; INTRAVENOUS at 07:53

## 2022-01-31 RX ADMIN — CELECOXIB 100 MG: 100 CAPSULE ORAL at 06:03

## 2022-01-31 RX ADMIN — FENTANYL CITRATE 50 MCG: 50 INJECTION INTRAMUSCULAR; INTRAVENOUS at 07:33

## 2022-01-31 RX ADMIN — Medication 80 MCG: at 08:32

## 2022-01-31 RX ADMIN — FENTANYL CITRATE 25 MCG: 50 INJECTION INTRAMUSCULAR; INTRAVENOUS at 08:45

## 2022-01-31 RX ADMIN — OXYCODONE 10 MG: 5 TABLET ORAL at 11:59

## 2022-01-31 RX ADMIN — Medication 10 MG: at 08:55

## 2022-01-31 RX ADMIN — ACETAMINOPHEN 975 MG: 325 TABLET ORAL at 06:03

## 2022-01-31 RX ADMIN — KETAMINE HYDROCHLORIDE 20 MG: 10 INJECTION INTRAMUSCULAR; INTRAVENOUS at 07:53

## 2022-01-31 RX ADMIN — SODIUM CHLORIDE 1 G: 9 INJECTION, SOLUTION INTRAVENOUS at 09:07

## 2022-01-31 NOTE — INTERVAL H&P NOTE
Update History & Physical    The Patient's History and Physical of January 31,    was reviewed with the patient and I examined the patient. There was no change. The surgical site was confirmed by the patient and me. Plan:  The risk, benefits, expected outcome, and alternative to the recommended procedure have been discussed with the patient. Patient understands and wants to proceed with the procedure.     Electronically signed by Chang Dumont MD on 1/31/2022 at 7:06 AM

## 2022-01-31 NOTE — PERIOP NOTES
Family / caregiver update provided, questions answered. Pending PT in about 1.5 hr. Will bring to bedside at  That time for PT and d/c instructions upon PT completion.

## 2022-01-31 NOTE — OP NOTES
OPERATIVE REPORT     Admit Date: 1/31/2022  Admit Diagnosis: Primary osteoarthritis of right hip [M16.11]  Preoperative Diagnosis: Primary osteoarthritis of right hip [M16.11]  Postoperative Diagnosis: Primary osteoarthritis of right hip [M16.11]    Procedure: Procedure(s):  RIGHT TOTAL HIP ARTHROPLASTY, DIRECT ANTERIOR, MAKOPLASTY (FAST TRACK)  Surgeon: Manjula Rubio MD  Assistant(s): Jone Back PA-C  Anesthesia: Regional   Estimated Blood Loss: 300cc  Specimens: * No specimens in log *   Complications: None        INDICATIONS:    The patient is a 75 y.o., female who has complained of a long history of right hip pain / groin pain. The patient has failed conservative treatment to include medical management / therapy and presents for definitive operative care. Informed consent was obtained including a discussion of the risks and benefits, which include, but are not limited to, bleeding, infection, neurovascular damage, wound complications, pain and stiffness in the hip, periprosthetic loosening, fracture, dislocation and venous thrombo-embolic disease, the patient consented for the procedure. DESCRIPTION OF PROCEDURE:       The proper side and hip were identified and signed in the preoperative holding area. All questions were answered. The patient was given Ancef preop for an antibiotic. After adequate anesthesia, the patient was positioned supine on the [place] table, the feet were well padded in the boots. The hip was then prepped and draped in sterile fashion. The contralateral tracking array was placed. A direct anterior approach was used through skin and the tensor fascia. The interval between the Tensor Fascia and Sartorius was used and retractors were placed in an atraumatic fashion. The lateral femoral circumflex artery and vein were identified and coagulated. The proximal and distal capsule was identified and excised. A tracking array was placed in the proximal greater trochanter.  The leg length and offset were verified with the MAKOplasty probe. A standard neck cut was made according to the implant geometry. The femoral head was removed. Further soft tissue was debrided and medial capsule along the neck cut released. Acetabular exposure was then obtained and the labrum was excised along with the foveal contents. Registration and acetabular mapping was performed. Once registration was complete and all soft tissues were removed the planned acetabular reamer was used in conjunction with the MAKOplasty arm. Remaining bone and osteophyte was removed, cysts were bone grafted as necessary. The final cup was then impacted in place with haptic guidance and rigid robotic arm assistance. There were no screws placed. The liner was then placed. The femur was then exposed, residual soft tissue was removed and the box osteotome was used along with blunt rounded canal finder. Sequential broaching was started with the opening broach and then the zero broach and broached up to the final implant size. The final implant was placed and a trial head was placed then the reduction was performed. Leg lengths and offset were verified. The final head was then impacted in place and thorough head and neck irrigation, the leg length was verified again. The femoral tracker was removed. The wound was copiously irrigated with 1L of dilute betadine solution 5%. The interval between the tensor and Sartorius was closed with 0-Vicryl and running stratafix suture. The sub-Q was closed with with 2-0 Vicryl, 4-0 monocryl and dermabond. The pin sites were closed with 4-0 Monocryl. A sterile dressing was applied. The patient was awoken from anesthesia and taken to the recovery room in a stable condiition. OPERATIVE FINDINGS : severe right hip OA noted. IMPLANTS :   Implant Name Type Inv.  Item Serial No.  Lot No. LRB No. Used Action   INSERT ACET E 0 DEG 36 MM HIP X3 TRIDENT - SN/A  INSERT ACET E 0 DEG 36 MM HIP X3 TRIDENT N/A [employer] HOW_ [id number] Right 1 Implanted   SHELL ACET CLUS H 52E TRTANIUM -- TRIDENT II - SN/A  SHELL ACET CLUS H 52E TRTANIUM -- TRIDENT II N/A [employer] HOW_WD [id number] Right 1 Implanted       POST OPERATIVE CONSIDERATIONS :   WBAT    JUSTIFICATION FOR SURGICAL ASSISTANT:   Surgical Assistant, was requried and necessary in this case, to help with soft tissue retraction, extremity positioning, equiment management, implant management, and wound closure.       Blank Luna MD

## 2022-01-31 NOTE — ANESTHESIA PREPROCEDURE EVALUATION
Relevant Problems   No relevant active problems       Anesthetic History   No history of anesthetic complications            Review of Systems / Medical History  Patient summary reviewed, nursing notes reviewed and pertinent labs reviewed    Pulmonary          Undiagnosed apnea    Pertinent negatives: No COPD, asthma and recent URI     Neuro/Psych   Within defined limits           Cardiovascular    Hypertension              Exercise tolerance: >4 METS     GI/Hepatic/Renal                Endo/Other        Obesity and arthritis     Other Findings              Physical Exam    Airway  Mallampati: II  TM Distance: 4 - 6 cm  Neck ROM: normal range of motion   Mouth opening: Normal     Cardiovascular  Regular rate and rhythm,  S1 and S2 normal,  no murmur, click, rub, or gallop             Dental    Dentition: Lower dentition intact and Upper dentition intact     Pulmonary  Breath sounds clear to auscultation               Abdominal         Other Findings            Anesthetic Plan    ASA: 2  Anesthesia type: general          Induction: Intravenous  Anesthetic plan and risks discussed with: Patient and Spouse

## 2022-01-31 NOTE — ANESTHESIA POSTPROCEDURE EVALUATION
Procedure(s):  RIGHT TOTAL HIP ARTHROPLASTY, DIRECT ANTERIOR, MAKOPLASTY (FAST TRACK). general    Anesthesia Post Evaluation      Multimodal analgesia: multimodal analgesia used between 6 hours prior to anesthesia start to PACU discharge  Patient location during evaluation: PACU  Patient participation: complete - patient participated  Level of consciousness: awake and alert  Pain management: adequate  Airway patency: patent  Anesthetic complications: no  Cardiovascular status: acceptable  Respiratory status: acceptable  Hydration status: acceptable  Post anesthesia nausea and vomiting:  none  Final Post Anesthesia Temperature Assessment:  Normothermia (36.0-37.5 degrees C)      INITIAL Post-op Vital signs:   Vitals Value Taken Time   /64 01/31/22 1230   Temp 36.5 °C (97.7 °F) 01/31/22 0955   Pulse 95 01/31/22 1147   Resp 12 01/31/22 1147   SpO2 98 % 01/31/22 1147   Vitals shown include unvalidated device data.

## 2022-01-31 NOTE — PERIOP NOTES
POST ANESTHESIA CARE    DISCHARGE / TRANSFER NOTE  Nani Ash Courts was:    [x] discharged        via   [x] Wheelchair          to [x] Private Vehicle     [] transferred   [] Carried   [] Taxi / Vehicle \"for Hire\"  [] Walk out  [] Ambulance / Medical Transportation   [] Stretcher  [] Hospital room _**_          [] Bed      Patient was escorted by:      [x] Nurse   [] Volunteer [] Transporter / Technician  [] Parent      [] Spouse / Family /      Patient verbalized     [x] appreciation and was very pleased with care received   [] frustration with care received       throughout their stay. Patient was discharged in     [x] pleasant mood  [] sad mood  [] mad mood . Pain at discharge/transfer was      0  /10. Discharge, medication and follow-up instructions were verbalized as understood prior to discharge  (if applicable for same-day procedures being discharged.)    All personal belongings have been returned to patient, and patient/family verbally confirm receiving belongings as all present.

## 2022-01-31 NOTE — PROGRESS NOTES
PHYSICAL THERAPY EVALUATION/DISCHARGE  Patient: Eunice Jean (63 y.o. female)  Date: 1/31/2022  Primary Diagnosis: Primary osteoarthritis of right hip [M16.11]  Procedure(s) (LRB):  RIGHT TOTAL HIP ARTHROPLASTY, DIRECT ANTERIOR, MAKOPLASTY (FAST TRACK) (Right) Day of Surgery   Precautions:   Fall,WBAT,Total hip      ASSESSMENT  Based on the objective data described below, the patient presents with decreased ROM, strength, and functional ambulation from her baseline of independence following admission for right LAVERNE, anterior makoplasty. Patient was seen in PACU due to being on fast track for same day discharge.  at bedside. PT educated them regarding hip precautions and exercises and handout provided. She ambulated 20 feet and went up and down 4 steps with CGA of 2. Handouts and gait belt provided for . Vitals stable. She is cleared for discharge from a PT standpoint. .    Functional Outcome Measure: The patient scored 60/100 on the Barthel outcome measure. Other factors to consider for discharge: none     Further skilled acute physical therapy is not indicated at this time. PLAN :  Recommendation for discharge: (in order for the patient to meet his/her long term goals)  Outpatient physical therapy follow up recommended for LAVERNE    This discharge recommendation:  Has not yet been discussed the attending provider and/or case management    IF patient discharges home will need the following DME: patient owns DME required for discharge       SUBJECTIVE:   Patient stated I can't believe that it doesn't hurt! Sobia Jacinto    OBJECTIVE DATA SUMMARY:   HISTORY:    Past Medical History:   Diagnosis Date    Anxiety     COVID-19 vaccine administered     boostered   [employer]    White coat syndrome without diagnosis of hypertension      Past Surgical History:   Procedure Laterality Date    HX COLONOSCOPY  09/2021    HX DILATION AND CURETTAGE  1990       Prior level of function: independent  Personal factors and/or comorbidities impacting plan of care: none    Home Situation  Rails to Enter: Yes  Hand Rails : Right  Current DME Used/Available at Home: Walker, rolling,Cane, straight,Raised toilet seat,Grab bars  Tub or Shower Type: Shower    EXAMINATION/PRESENTATION/DECISION MAKING:   Critical Behavior:  Neurologic State: Alert  Orientation Level: Appropriate for age,Oriented X4  Cognition: Appropriate decision making,Appropriate for age attention/concentration,Appropriate safety awareness,Follows commands,Recognition of people/places  Safety/Judgement: Good awareness of safety precautions  Hearing:     Skin:  Not observed    Range Of Motion:  AROM: Within functional limits (RLE less due to surgery)                       Strength:    Strength: Within functional limits (RLE less due to surgery)                    Tone & Sensation:   Tone: Normal              Sensation: Intact                    Functional Mobility:  Bed Mobility:     Supine to Sit: Stand-by assistance; Additional time     Scooting: Stand-by assistance  Transfers:  Sit to Stand: Contact guard assistance;Assist x2  Stand to Sit: Contact guard assistance                       Balance:   Sitting: Intact  Standing: Intact; With support  Ambulation/Gait Training:  Distance (ft): 20 Feet (ft)  Assistive Device: Gait belt;Walker, rolling  Ambulation - Level of Assistance: Assist x2;Contact guard assistance        Gait Abnormalities: Step to gait  Right Side Weight Bearing: As tolerated                                   Stairs:  Number of Stairs Trained: 4  Stairs - Level of Assistance: Contact guard assistance;Assist X2   Rail Use: Right  (cane on left)    Therapeutic Exercises:    Ankle pumps, quad and heel and gluteal sets, heel slides, internal rotation, hip abduction    Functional Measure:  Barthel Index:    Bathin  Bladder: 10  Bowels: 10  Groomin  Dressin  Feeding: 10  Mobility: 5  Stairs: 5  Toilet Use: 5  Transfer (Bed to Chair and Back): 5  Total: 60/100       The Barthel ADL Index: Guidelines  1. The index should be used as a record of what a patient does, not as a record of what a patient could do. 2. The main aim is to establish degree of independence from any help, physical or verbal, however minor and for whatever reason. 3. The need for supervision renders the patient not independent. 4. A patient's performance should be established using the best available evidence. Asking the patient, friends/relatives and nurses are the usual sources, but direct observation and common sense are also important. However direct testing is not needed. 5. Usually the patient's performance over the preceding 24-48 hours is important, but occasionally longer periods will be relevant. 6. Middle categories imply that the patient supplies over 50 per cent of the effort. 7. Use of aids to be independent is allowed. Score Interpretation (from [address] [facility] 83)    Independent   60-79 Minimally independent   40-59 Partially dependent   20-39 Very dependent   <20 Totally dependent     -Emelyn Leblanc., Barthel, D.W. (1965). Functional evaluation: the Barthel Index. [address] [facility] ([address] [place] [address] (1997). The Barthel activities of daily living index: self-reporting versus actual performance in the old (> or = 75 years). Journal [address] 457), [address] [facility], ..Estrellita, Daniele Morel., Chiquita Lobato. (1999). Measuring the change in disability after inpatient rehabilitation; comparison of the responsiveness of the Barthel Index and Functional [place] Measure. Journal of Neurology, Neurosurgery, and Psychiatry, 66(4), 528-156. VISHAL Neal.TRIPP, SAMIR Rosen, & Keenan Chapin, MEstrellitaA. (2004) Assessment of post-stroke quality of life in cost-effectiveness studies: The usefulness of the Barthel Index and the EuroQoL-5D.  Quality of Life Research, 15, 329-63          Physical Therapy Evaluation Charge Determination   History Examination Presentation Decision-Making   LOW Complexity : Zero comorbidities / personal factors that will impact the outcome / POC LOW Complexity : 1-2 Standardized tests and measures addressing body structure, function, activity limitation and / or participation in recreation  LOW Complexity : Stable, uncomplicated  LOW Complexity : FOTO score of       Based on the above components, the patient evaluation is determined to be of the following complexity level: LOW     Pain Rating:  none    Activity Tolerance:   Good      After treatment patient left in no apparent distress:   Sitting in chair and Caregiver / family present    COMMUNICATION/EDUCATION:   The patients plan of care was discussed with: Registered nurse. Fall prevention education was provided and the patient/caregiver indicated understanding. and Patient/family agree to work toward stated goals and plan of care.     Thank you for this referral.  Xiao Anuglo, PT   Time Calculation: 26 mins

## 2022-01-31 NOTE — DISCHARGE INSTRUCTIONS
TOTAL HIP DISCHARGE INSTRUCTIONS    Patient: Odilia Louis MRN: [id number]  SSN: [id number]              Please take the time to review the following instructions before you leave the hospital and use them as guidelines during your recovery from surgery. If you have any questions you may contact my office at [phone]  After business hours or during the weekend you can contact me through [address] or text / call at [phone] (cell phone) for emergency's. Please use the office number during regular business hours. SPECIAL INSTRUCTIONS :   1. Do not bend greater than 90 degrees at the hip for 4 weeks following your discharge  2. Avoid exercises or activities which bring the leg out or away from the mid-line of the body. The surgical repair involves this muscle and it will require 4 weeks to heal. You may disregard these instructions for a direct anterior approach. 3. You may walk as tolerated and are encouraged to work daily on progressing your activities with a walker initially. 4. You may transition to a cane for walking 5-7 days from surgery once you feel safe. You may use a walker for longer periods if you feel unstable. 5. Call my office for routine questions M-F at [phone]. You may contact me directly through [address] [place] [address] if there are specific questions or text / call using my cell number [phone]. DRESSING :     Post-op Dressings : This should be removed by physical therapy or you may remove this yourself 7 days after the date of your surgery. If there is no drainage, then a simple dressing may be used or no dressing at all. Other dressing options can be purchased over the counter at a local pharmacy or medical supply vendor. A porous adhesive dressing such as pictured above can be purchased at a local [employer] or [employer]. You only need to keep the incision covered for 7 days after showers.  A dressing may be used for longer if there are issues with clothing clinging to the incision. Showering/ Bathing: You may shower with the Post-op dressing in place. This is left in place for 7 days following discharge from the hospital. If your incision is dry without drainage you may shower following your discharge home. After 7 days your dressing should be removed for showering. It is fine to have water run over the incision. Do not vigorously scrub your incision. Apply a clean, dry dressing after you have dried your incision. Do not take a bath or get into a swimming pool / [employer] until you follow up with Dr. Aram Wills. Do not soak your incision under water. If there is continued drainage or you are concerned contact Dr Sean Welch office prior to showering [phone] ext [phone] . Diet:  You may advance to your regular diet as tolerated. Increase your clear liquid intake for the next 2-3 days. Medication:      1. You will be given prescriptions for pain medication when you are discharged from the hospital. The side effects of these medications can be substantial and the narcotic medications are not mandatory. You may substitute these medications with Tylenol or Alleve / Motrin. 2. Please use the medications as prescribed. Pain medications may cause constipation- Colace twice daily and Miralax one scoop daily while taking the narcotic medication should help prevent constipation. Please discuss with your local pharmacist regarding increasing this dosage if constipation persists. Other possible side effects of pain medication are dizziness, headache, nausea, vomiting, and urinary retention. Discontinue the pain medication if you develop itching, rash, shortness of breath, or difficulties swallowing. If these symptoms become severe or are not relieved by discontinuing the medication, you should seek immediate medical attention. 3. Refills of pain medication are authorized during office hours only (8 AM- 5 PM  Monday thru Friday).  Many of these medication will require you or a family member to pick-up a physical prescription at the office. 4. Medications other than antiinflammatories will not be called into the pharmacy after business hours. 5. You may resume the medication(s) you were taking prior to your surgery. Narcotics may change the effects of some antidepressant medication(s). If you have any questions about possible interactions between your regular medications and the pain medication, you should ask the pharmacist or contact the prescribing physician. 6. If you have constipation which is not improved by oral stool softeners then a Ducolax suppository should be purchased over the counter. 7. Continue the blood thinner (Aspirin or Lovenox) for a total of 30 days following surgery. Follow up appointment:    Please call our office at [phone] for your follow up appointment. This should be scheduled 14 days following the date of surgery. Physical Therapy / Nursing:    Physical Therapy following surgery will be arranged as an outpatient or at home. They have specific instructions for rehab and wound care. It is fine to have physical therapy remove your dressing at 7 days following surgery. Returning to work:    Normal return to work is 6-12 weeks following surgery. Depending on your progression following surgery and specific job duties you may take longer for a full return to work. DRIVING    You should not return to driving until you are off all opioid pain medications and able to safely and quickly apply the brakes. This is normally 2-6 weeks for left sided surgery and 2-8 weeks for right sided surgery. Important Signs and Symptoms:    If any of the following signs or symptoms occur, you should contact Dr. Bertha Arboleda office.   Please be advised if a problem arises which you feel requires immediate medical attention or you are unable to contact Dr. Bertha Arboleda office you should seek immediate medical attention at the ER or other health care facility you have access to.    1. A sudden increase in swelling and/or redness or warmth at the area your surgery was performed which isnt relieved by rest, ice, and elevation. 2. Oral temperature greater than 101 degrees for 12 hours or more which isnt relieved by an increase in fluid intake and taking 2 Tylenol every 4-6 hours. 3. Excessive drainage from your incisions, or drainage which hasnt stopped by 72 hours after your surgery. 4. Fever, chills, shortness of breath, chest pain, nausea, vomiting or other signs and symptoms which are of concern to you. YOUR TOTAL JOINT REPLACEMENT  FREQUENTLY ASKED QUESTIONS   What should I take for pain?  o You will be discharged with four medications for pain (Oxycodone, Tramadol, Ibuprofen and Tylenol). These may vary slightly depending on what you were taking in the hospital.   - 1st Line - Tylenol Arthritis Strength - 325-650 mg every 4 hours (scheduled for the 1st 24 hours)  - 2nd Line -  Ibuprofen 400 (2 tabs) - 800 (4 tabs) mg every 8 hours  (scheduled for the 1st 24 hours)  - 3rd Line - Oxycodone 5 mg (1-2 tablets every 4-6 hrs)  - 4th Line - Tramadol 50 mg (1-2 tablets every 4-6 hours) - take these between Oxycodone doses if your pain is not alleviated.  When should I call for advice regarding my pain?  o If your pain is still uncontrolled after being on the regimen above for at least 12 hours, please call the office [phone] or text / call my cell after hours [phone].  Can I get refills?  o Opioid refills are provided for the first 2-6 weeks following surgery. o Use Tylenol 500 mg along with Aleve 220mg twice daily or Motrin 200-800mg every 4-6 hours during the daytime hours after two weeks. - After two weeks, I suggest the opioid pain medications be used only 1 hour prior to your physical therapy appointment and 1 hour before sleeping at night. Use Tylenol and Ibuprofen at other times during the day. - Keep in mind that you will need to discontinue opioids before you resume driving.  Is swelling normal?  o Almost everyone has some degree of swelling following surgery. o Following hip and knee replacement surgery, swelling can be normal below the incision for the first few weeks. - This swelling peaks around 5-7 days after surgery. - It is not unusual to have some bruising about the back of the thigh, calf, ankle, and foot.  What should I do for the swelling?  o Keep the limb elevated above the level of your heart - 'Toes above Nose'. o Apply compression socks (knee high for total knees and up to the mid-thigh for total hips). o Use the ice packs that you are discharged home with several times a day for the first several weeks.  How long should I remain on blood thinners following surgery? o 30 days   Which blood thinners will I be on? Can I take them with Tylenol?  o  Aspirin 81 mg twice daily - these should be taken with meals and can be used with Tylenol. In certain instances, you may be sent home on Lovenox for 30 days. o For short periods of time (30 days), aspirin and anti-inflammatories (i.e. Aleve, Motrin / Advil / ibuprofen, diclofenac, etc. can be taken together).  When can I drive?  o Once you have stopped using regular narcotic pain medications (Oxycodone, Percocet, Lortab, Norco etc.) and can safely apply the brakes without hesitation, (emergency braking).  When can I shower?  o You may shower immediately if your Optifoam bandage is dry and without discharge. The Optifoam dressing should be removed 7 days following surgery, after which you may continue to shower.  o No submersion of the incision, bathing or swimming for 14 days following surgery or until cleared by Dr Zach Hollins.    Can I remove this dressing?  o Yes, this is removed just like removing a band aid.  o If you are concerned, this can be removed by your therapist.   Verl Raw What do I do with the dressing when I shower?  o The Optifoam dressing is waterproof and you may shower with it.   o The incision is sealed with Dermabond, a biologic skin glue, which also serves as a watertight seal. If your incision is draining, it is no longer considered to be watertight - you should contact our office prior to showering if you experience any drainage.  Which dressing should I purchase after I remove my Optifoam?  o An occlusive dressing which covers your entire incision. This does not have to be waterproof, but will need to be removed when you shower and then replaced. (Example Only)   How active should I be following surgery? o Progress activities in moderation and at your own pace.   o Walking room to room in your house is encouraged. o Walk each day and set progressive goals with small increments (1st week -1/2 block of walking, 2nd week - 1 block, 3rd week - 2 blocks, etc.)   Will I need help at home?  o You will likely need a caretaker who should be available for the first week following surgery. It is fine for family members to work during the day, as long as they are available by phone. o Planning ahead makes coming home from the hospital a much easier transition.  How long will my surgery take?  o On average, total joint replacement takes approximately 1-2 hour.   o The entire process, including pre-op and post-op care can last as long as 4- 5 hours before you are transferred to your room. o stroke, pulmonary embolism (a clot going from the legs to the lungs), and even death with surgery.  Will I be given antibiotics? Will I need antibiotics at discharge?  o Antibiotics will be given to you both before and after your procedure. To further minimize the risk of infection, we have streamlined the surgical procedure to take less time in the operating room.    o You do not require antibiotics following surgery.       Please do not hesitate to contact me through [employer] or by text / call me at [phone] (cell phone) for questions following surgery - MD Cruzito Feliciano MD  Cell [phone]  Maryanne Martel PA-C  Cell [phone]  Medical Staff : Misael Geller @ [phone]          DISCHARGE SUMMARY from Your Nurse    PATIENT INSTRUCTIONS:    AFTER ANESTHESIA & SEDATION, and WHILE TAKING PAIN MEDICINE  After general anesthesia / intravenous sedation and the 24 hours following, and/or while taking prescription Opiates:  · Limit your activities  · Do not drive and operate hazardous machinery until you have been of all narcotics and sedatives for over 24 hours  · Do not make important personal or business decisions  · Do  not drink alcoholic beverages  · If you have not urinated within 8 hours after discharge, please contact your surgeon on call. SIGNS OF INFECTION, THINGS TO REPORT TO YOUR DOCTOR  Report the following Signs of Infection or General Problems after surgery to your surgeon:  · Excessive pain, swelling, redness, drainage, pus or odor of or around the surgical area  · Fever/ temperature over 101; Temperature over 100 if on medications (chemotherapy or medicines which affect your ability to fight infections)  · Nausea and vomiting lasting longer than 4 hours or if unable to take medications  · Any signs of decreased circulation or nerve impairment to extremity: change in color, persistent  numbness, tingling, coldness or increase pain  · If you have any questions. GOOD HELP TO FIGHT AN INFECTION  Here are a few tip to help reduce the chance of getting an infection after surgery:   Wash Your Hands   Good handwashing is the most important thing you and your caregiver can do.  Wash before and after caring for any wounds. Dry your hand with a clean towel.  Wash with soap and water for at least 20 seconds. A TIP: sing the \"Happy Birthday\" song through one time while washing to help with the timing.    Use a hand  in between washings.  Shower   When your surgeon says it is OK to take a shower, use a new bar of antibacterial soap (if that is what you use, and keep that bar of soap ONLY for your use), or antibacterial body wash.  Use a clean wash cloth or sponge when you bathe.  Dry off with a clean towel  after every bath - be careful around any wounds, skin staples, sutures or surgical glue over/on wounds.  Do not enter swimming pools, hot tubs, lakes, rivers and/or ocean until wounds are healed and your doctor/surgeon says it is OK.  Use Clean Sheets   Sleep on freshly laundered sheets after your surgery.  Keep the surgery site covered with a clean, dry bandage (if instructed to do so). If the bandage becomes soiled, reapply a new, dry, clean bandage.  Do not allow pets to sleep with you while your wound is healing.  Lifestyle Modification and Controlling Your Blood Sugar   Smoking slows wound healing. Stop smoking and limit exposure to second-hand smoke.  High blood sugar slows wound healing. Eat a well-balanced diet to provide proper nutrition while healing   Monitor your blood sugar (if you are a diabetic) and take your medications as you are suppose to so you can control you blood sugar after surgery. COUGH AND DEEP BREATHE  Breathing deep and coughing are very important exercises to do after surgery. Deep breathing and coughing open the little air tubes and air sacks in your lungs. You take deep breaths every day. You may not even notice - it is just something you do when you sigh or yawn. It is a natural exercise you do to keep these air passages open. After surgery, take deep breaths and cough, on purpose. Coughing and deep breathing help prevent bronchitis and pneumonia after surgery. If you had chest or belly surgery, use a pillow as a \"hug sarah\" and hold it tightly to your chest or belly when you cough. DIRECTIONS:  1.  Take 10 to 15 slow deep breaths every hour while awake. 2. Breathe in deeply, and hold it for 2 seconds. 3. Exhale slowly through puckered lips, like blowing up a balloon. 4. After every 4th or 5th deep breath, hug your pillow to your chest or belly and give a hard, deep cough. Yes, it will probably hurt if you had abdominal surgery. But doing this exercise is very important part of healing after surgery. Take your pain medicine to help you do this exercise without too much pain. ANKLE PUMPS    Ankle pumps increase the circulation of oxygenated blood to your lower extremities and decrease your risk for circulation problems such as blood clots. They also stretch the muscles, tendons and ligaments in your foot and ankle, and prevent joint contracture in the ankle and foot, especially after surgeries on the legs. It is important to do ankle pump exercises regularly after surgery because immobility increases your risk for developing a blood clot. Your doctor may also have you take an Aspirin for the next few days as well. If your doctor did not ask you to take an Aspirin, consult with him before starting Aspirin therapy on your own. The exercise is quite simple. · Slowly point your foot forward, feeling the muscles on the top of your lower leg stretch, and hold this position for 5 seconds. · Next, pull your foot back toward you as far as possible, stretching the calf muscles, and hold that position for 5 seconds. · Repeat with the other foot. · Perform 10 repetitions every hour while awake for both ankles if possible (down and then up with the foot once is one repetition). You should feel gentle stretching of the muscles in your lower leg when doing this exercise. If you feel pain, or your range of motion is limited, don't push too hard. Only go the limit your joint and muscles will let you go.   If you have increasing pain, progressively worsening leg warmth or swelling, STOP the exercise and call your doctor. Other Wound Care information:  [] No additional recommendations. Cold Therapy Instructions    Your nurse will provide a cold therapy wrap based upon your surgery, need, and your doctor's orders. INSTRUCTIONS FOR USE:  All cooling wraps produce sustained periods of intense cold. NEVER PLACE PAD ON BARE SKIN OR HAVE CONTACT WITH BARE SKIN  Always Use An Insulating Barrier. Tissue injury can occur if these devices are used improperly. Follow your surgeons instructions carefully regarding the frequency, duration and breaks from cold therapy, and the total length of treatment. Check under the pad barrier every 2 hours for skin injury (see below.)      SIGNS OF SKIN INJURY:  STOP USE AND CONTACT YOUR SURGEON if any of the following occur: Increased pain, burning, increased swelling, itching, blisters, increased redness, discoloration, welts, or other change in skin condition. INDICATIONS:  Back, Hip, Knee or Shoulder Surgery and Post-Operative Treatment; Trauma; Orthopedic Rehabilitation      CONTRAINDICATIONS:  Cold therapy should not be used by persons with Diabetes, Raynaud's or other vasospastic disease, cold hypersensititvity, or compromised local circulation. Certain medical conditions make cold-induced injury more likely. Please consult with your healthcare provider before use. Below is information on the medication(s) your doctor is prescribing for you: The maximum daily dose of acetaminophen was discussed with the patient. She was encouraged not to exceed 3,000 mg of acetaminophen during a 24 hour period and was asked to keep in mind that acetaminophen can also be found in many over-the-counter cold medications as well as narcotics that may be given for pain. The patient expresses understanding of these issues and questions were answered.       4 THINGS ABOUT PAIN MEDICINE I ALWAYS TALK ABOUT:  There are 4 side effects I always talk about for pain medications. 1. They make you sleepy and drowsy. Do not drive a car or operate machines while taking pain medication. Do not make any major decisions. Take a nap. Relax. Let your body recover from the affects of anesthesia and surgery. 2. Some people have quite a problem with itching and. ..  3. Nausea and/or vomiting. These are mention together because they are a related genetic issue; while some people experience these problems, others do not. These are expected and know side effects. Itching is caused by histamine release - practically all opiate medications can cause this. An over-the-counter anti-histamine can help. Over-the-counter Benadryl® (the generic drug name is diphenhydramine) can help, but may cause increased drowsiness which can be intensified by pain medications. Over-the-counter Claritin® (the generic drug name is loratadine) or Zyrtec® (the generic drug name is cetirizine) may be effective without as much drowsiness as with the Benadryl/diphenhydramine. If you have nausea, like the itching, practically all opioids can cause this. Hopefully your surgeon may have given you some medicine for nausea. If your surgeon did not give you anti-nausea medications, and you are experiencing nausea/vomiting that prevent you from drinking clear liquids, CALL HIM/HER and request them, especially if these issues seem to get worse after you leave the hospital.    4. Last but not least is the problem of constipation (not tania able to have a bowel movement - poop.)  All pain medicine can slow down the movement of food through the gut. The slower it goes, the worse it can be. This only adds insult to the injury of surgery. And if you had tummy surgery, like having your gall bladder removed or a hernia repair, YOU DO NOT WANT THIS PROBLEM. There are 4 things I recommend. · Drink lots of fluids.   For healthy people with no heart problems, this means at least 64 ounces of liquids or more per day. For example, a Big Gulp® from [employer] is 32 ounces. So you need to drink at least 2  Big Gulp®'s of fluids every day. If you have heart problems you may not be able to do this. Talk to your doctor about what you should do to prevent constipation. · Drinking fruit juice like apple, pear, or prune juice gives you extra \"BANG\" for your beverage. These drinks are high in natural fiber. If you are a diabetic, drink sugar-free fluids with fiber additives (see next 2 points.)  Avoid drinking extra fruit juice unless this is a regular part of your diet plan. · Eat extra fresh fruits and vegetables. · Add extra fiber-products. Fiber products like Metamucil®, Citrucel®, Miralax® or Benefiber® can help. These products are over-the-counter and you do not need a prescription from your doctor. If you have followed these recommendations and still have some difficulty having a good poop, take and over-the-counter stimulant like Dulcolax® (biscodyl)  or Senokot® (senna concentrate). These may help get things moving. [facility] MEDICATION AND   SIDE EFFECT GUIDE    The [facility] MEDICATION AND SIDE EFFECT GUIDE was provided to the PATIENT AND CARE PROVIDER. Information provided includes instruction about drug purpose and common side effects for the following medications:   · Acetaminophen  · Ibuprofen  · Oxycodone  · Tramadol  · Aspirin  · Zofran      Medication information added to discharge record on January 31, 2022 at 9:20 AM.      Some information we wish all of our patients to be familiar with and General Information for Healthy Lifestyle choices:    · Make a list of your current medications with your Primary Care Provider. · Update this list whenever your medications are discontinued, doses are changed, or new medications (including over-the-counter products like ibuprofen, vitamins, or herbal remedies) are added.   · Carry medication information at all times in case of emergency situations      No smoking / No tobacco products / Avoid exposure to second hand smoke    Surgeon General's Warning:  Quitting smoking now greatly reduces serious risk to your health. Obesity, smoking, and sedentary lifestyle greatly increases your risk for illness. A healthy diet, regular physical exercise & weight monitoring are important for maintaining a healthy lifestyle. A Note About Congestive Heart Failure: You may be retaining fluid if you have a history of heart failure or if you experience any of the following symptoms:      · Weight gain of 3 pounds or more overnight or 5 pounds in a week  · Increased swelling in our hands or feet  · Shortness of breath while lying flat in bed      Please call your doctor as soon as you notice any of these symptoms; do not wait until your next office visit. A Note About Strokes:  Recognize signs and symptoms of STROKE. The simple mnemonic, F.A.S.T., can help you remember signs of a stroke and what to do if you suspect a stroke is occuring to you or someone you are with:    F - Face looks uneven  A - Arms unable to move, or move evenly  S - Speech is slurred or non-existent  T - Time - CALL 911 as soon as signs and symptoms begin - DO NOT go to bed or wait to see if you get better - TIME IS BRAIN. Warning Signs of HEART ATTACK   Call 911 if you have these symptoms:     Chest discomfort. Most heart attacks involve discomfort in the center of the chest that lasts more than a few minutes, or that goes away and comes back. It can feel like uncomfortable pressure, squeezing, fullness, or pain.  Discomfort in other areas of the upper body. Symptoms can include pain or discomfort in one or both arms, the back, neck, jaw, or stomach.  Shortness of breath with or without chest discomfort.  Other signs may include breaking out in a cold sweat, nausea, or lightheadedness. Don't wait more than five minutes to call 911 - MINUTES MATTER! Fast action can save your life. Calling 911 is almost always the fastest way to get lifesaving treatment. Emergency Medical Services staff can begin treatment when they arrive -- up to an hour sooner than if someone gets to the hospital by car. Learning About Coronavirus [phone])  Coronavirus [phone]): Overview  What is coronavirus (COVID-19)? The coronavirus disease (COVID-19) is caused by a virus. It is an illness that was first found in [place], [place], in December 2019. It has since spread worldwide. The virus can cause fever, cough, and trouble breathing. In severe cases, it can cause pneumonia and make it hard to breathe without help. It can cause death. Coronaviruses are a large group of viruses. They cause the common cold. They also cause more serious illnesses like Middle East respiratory syndrome (MERS) and severe acute respiratory syndrome (SARS). COVID-19 is caused by a novel coronavirus. That means it's a new type that has not been seen in people before. This virus spreads person-to-person through droplets from coughing and sneezing. It can also spread when you are close to someone who is infected. And it can spread when you touch something that has the virus on it, such as a doorknob or a tabletop. What can you do to protect yourself from coronavirus (COVID-19)? The best way to protect yourself from getting sick is to:  · Avoid areas where there is an outbreak. · Avoid contact with people who may be infected. · Wash your hands often with soap or alcohol-based hand sanitizers. · Avoid crowds and try to stay at least 6 feet away from other people. · Wash your hands often, especially after you cough or sneeze. Use soap and water, and scrub for at least 20 seconds. If soap and water aren't available, use an alcohol-based hand . · Avoid touching your mouth, nose, and eyes. What can you do to avoid spreading the virus to others?   To help avoid spreading the virus to others:  · Cover your mouth with a tissue when you cough or sneeze. Then throw the tissue in the trash. · Use a disinfectant to clean things that you touch often. · Stay home if you are sick or have been exposed to the virus. Don't go to school, work, or public areas. And don't use public transportation. · If you are sick:  ? Leave your home only if you need to get medical care. But call the doctor's office first so they know you're coming. And wear a face mask, if you have one.  ? If you have a face mask, wear it whenever you're around other people. It can help stop the spread of the virus when you cough or sneeze. ? Clean and disinfect your home every day. Use household  and disinfectant wipes or sprays. Take special care to clean things that you grab with your hands. These include doorknobs, remote controls, phones, and handles on your refrigerator and microwave. And don't forget countertops, tabletops, bathrooms, and computer keyboards. When to call for help  Call 911 anytime you think you may need emergency care. For example, call if:  · You have severe trouble breathing. (You can't talk at all.)  · You have constant chest pain or pressure. · You are severely dizzy or lightheaded. · You are confused or can't think clearly. · Your face and lips have a blue color. · You pass out (lose consciousness) or are very hard to wake up. Call your doctor now if you develop symptoms such as:  · Shortness of breath. · Fever. · Cough. If you need to get care, call ahead to the doctor's office for instructions before you go. Make sure you wear a face mask, if you have one, to prevent exposing other people to the virus. Where can you get the latest information? The following health organizations are tracking and studying this virus. Their websites contain the most up-to-date information. Barrett Corbin also learn what to do if you think you may have been exposed to the virus. · [place]  Centers for Disease Control and Prevention (CDC): The CDC provides updated news about the disease and travel advice. The website also tells you how to prevent the spread of infection. www.cdc.gov  · World Health Organization [facility]): WHO offers information about the virus outbreaks. WHO also has travel advice. www.who.int  Current as of: April 1, 2020               Content Version: 12.4  © 2006-2020 [employer]. Care instructions adapted under license by your healthcare professional. If you have questions about a medical condition or this instruction, always ask your healthcare professional. [address] any warranty or liability for your use of this information. AT THE COMPLETION OF DISCHARGE INSTRUCTION REVIEW, WE VERIFY:  The discharge information has been reviewed with the patient and caregiver. Questions have been asked and answered meeting patient and caregiver expectations. The patient and caregiver verbalized understanding.     Your discharge nurse was Janki Dominguez RN-BC       Board Certified - Pain Management      CONTENTS FOUND IN YOUR DISCHARGE ENVELOPE:  [x]     Surgeon and Hospital Discharge Instructions  [x]     [facility] Care Provider Card  [x]     Medication & Side Effect Guide            (your newly prescribed medications have been marked/highlighted showing the most common side effects from the classes of drugs on your prescriptions)  [x]     Medication Prescription(s) x 6 ( [x] These have been sent electronically to your pharmacy by your surgeon,   - OR -       your surgeon has already provided these to you during a previous/pre-op office visit)  []     Pain block and/or block with On-Q Catheter from Anesthesia Service (information included in your instructions above)        []    [facility] Education Information  []     Physical Therapy Prescription  []     Follow-up Appointment Cards  []     Surgery-related Pictures/Media  [x]     Medical device information sheets/pamphlets from their    []     School/work excuse note. []     /parent work excuse note. The following personal items collected during your admission for safe keeping are returned to you:     Dental Appliance: Dental Appliances: None  Vi oj:    Hearing Aid:    Jewelry: Jewelry: None  Clothing: Clothing: [place] Cloud  Other Valuables:  Other Valuables: Eyeglasses  Valuables sent to safe:

## 2022-01-31 NOTE — PERIOP NOTES
PACU IN REPORT FROM ANESTHESIA    Verbal report received from   Shashank Joyner   [] MD/DO-Anesthesiologist    [x] CRNA   [x] with student    CHOICE ANESTHESIA:  [x] GENERAL  [] TIVA  [] MAC  [] LOCAL  [] REGIONAL  [] SPINAL   [] EPIDURAL   **Note the anesthesia record for medications given intraoperatively. **           [] E.R.A.S. PROTOCOL    SURGICAL PROCEDURE: Procedure(s) (LRB):  RIGHT TOTAL HIP ARTHROPLASTY, DIRECT ANTERIOR, MAKOPLASTY (FAST TRACK) (Right)     SURGEON: Estelita Villa MD.    Brief Initial Visual Assessment:    Patient Age: [] Infant(1-12mo)      []Pediatric(1-13yrs)    [] Adolescent(13-18yrs)    [] Adult(18-65yrs)      [x]Geriatric Adult(>65yrs). Patient    [] Alert           []Calm & Cooperative      [] Anxious  Appearance: [] Drowsy      [x] Sedated      [x] Unresponsive     Oriented x  0            Airway:     [x] Patent                          [] Obstructs easily/Obstructed on arrival   [] \"Difficult Airway\" report by Anesthesia                        [] Airway improved with head/airway repositioning                       Airway Adjuncts Present: [] Oral Airway    [] Nasal Trumpet    [] ETT    [] LMA            Respiratory  [x] Even   [] Labored   [] Shallow   [] Tachypnea   [] Bradypnea  Pattern:    [x] Non-Labored  [] VENT and/or respiratory assistance     being provided. Skin:     [x] Pink [] Dusky    [] Pale        [x] Warm    [] Hot [] Cool       [] Cold   [x]Dry [] Moist [] Diaphoretic     Membranes:  [x] Pink [] Pale       [x] Moist [] Dry     [] Crusty     Pain:   [x] No Acute Discomfort. 0  /10 Scale [] Verbal Numeric   [] Moderate Discomfort.     [] V.A.S. [] Acute Discomfort. [x] A.N.V.    [] Chronic-Issue Related Discomfort.   [] F.L.A.C.C. Note E-MAR for medications administered. []Faces, Rasmussen/Baker    Note assessments documented in flowsheets; any assessment variants to be found in comments or narrative perioperative nurse notes. Post-anesthesia care now assumed by [facility] BSN, RN-BC

## 2022-02-03 ENCOUNTER — TELEPHONE (OUTPATIENT)
Dept: SURGERY | Age: 67
End: 2022-02-03

## 2022-02-03 NOTE — TELEPHONE ENCOUNTER
Post Discharge Phone placed to patient after Joint Replacement surgery   Spoke with patient    Patient is taking tylenol and advil, oxycodone before bed  States pain is tolerable and pain medication is effective.    Patient was able to fill prescriptions, no medication questions    Discharge medications reviewed  States discharge instructions were clear and easy to understand has no questions  Patient is using a walker without difficulty  Walking hourly while awake  Able to do exercises regularly  Bruising is minimal  Swelling is minimal   Patient is elevating leg when resting  Using ice with good relief  States dressing is intact without drainage, x2  Removal education provided  Denies N/V, appetite is fair  Constipation is mild, +BM today  Follow up appointment is scheduled with surgeon   PT is scheduled tomorrow  Denies fever, cough, chest pain, back pain, SOB  Denies any unusual symptoms  Discussed calling surgeon or PCP for concerns  Reminded patient to fill out survey  Opportunity given for patient to ask questions  Call duration 10:00 minutes

## 2022-02-04 ENCOUNTER — ANESTHESIA EVENT (OUTPATIENT)
Dept: SURGERY | Age: 67
DRG: 327 | End: 2022-02-04
Payer: COMMERCIAL

## 2022-02-04 ENCOUNTER — TELEPHONE (OUTPATIENT)
Dept: SURGERY | Age: 67
End: 2022-02-04

## 2022-02-04 ENCOUNTER — HOSPITAL ENCOUNTER (INPATIENT)
Age: 67
LOS: 13 days | Discharge: HOME | DRG: 327 | End: 2022-02-17
Attending: STUDENT IN AN ORGANIZED HEALTH CARE EDUCATION/TRAINING PROGRAM | Admitting: SURGERY
Payer: COMMERCIAL

## 2022-02-04 ENCOUNTER — ANESTHESIA (OUTPATIENT)
Dept: SURGERY | Age: 67
DRG: 327 | End: 2022-02-04
Payer: COMMERCIAL

## 2022-02-04 ENCOUNTER — APPOINTMENT (OUTPATIENT)
Dept: CT IMAGING | Age: 67
DRG: 327 | End: 2022-02-04
Attending: NURSE PRACTITIONER
Payer: COMMERCIAL

## 2022-02-04 ENCOUNTER — APPOINTMENT (OUTPATIENT)
Dept: GENERAL RADIOLOGY | Age: 67
DRG: 327 | End: 2022-02-04
Attending: NURSE PRACTITIONER
Payer: COMMERCIAL

## 2022-02-04 DIAGNOSIS — R50.9 FEVER, UNSPECIFIED FEVER CAUSE: ICD-10-CM

## 2022-02-04 DIAGNOSIS — R10.84 ABDOMINAL PAIN, GENERALIZED: ICD-10-CM

## 2022-02-04 DIAGNOSIS — D72.829 LEUKOCYTOSIS, UNSPECIFIED TYPE: ICD-10-CM

## 2022-02-04 DIAGNOSIS — K25.5 GASTRIC PERFORATION (HCC): Primary | ICD-10-CM

## 2022-02-04 PROBLEM — K25.9 GASTRIC ULCER: Status: ACTIVE | Noted: 2022-02-04

## 2022-02-04 LAB
ALBUMIN SERPL-MCNC: 2.5 G/DL (ref 3.5–5)
ALBUMIN/GLOB SERPL: 0.6 {RATIO} (ref 1.1–2.2)
ALP SERPL-CCNC: 72 U/L (ref 45–117)
ALT SERPL-CCNC: 27 U/L (ref 12–78)
ANION GAP SERPL CALC-SCNC: 7 MMOL/L (ref 5–15)
AST SERPL-CCNC: 41 U/L (ref 15–37)
BASOPHILS # BLD: 0 K/UL (ref 0–0.1)
BASOPHILS NFR BLD: 0 % (ref 0–1)
BILIRUB SERPL-MCNC: 0.3 MG/DL (ref 0.2–1)
BUN SERPL-MCNC: 30 MG/DL (ref 6–20)
BUN/CREAT SERPL: 34 (ref 12–20)
CALCIUM SERPL-MCNC: 8.9 MG/DL (ref 8.5–10.1)
CHLORIDE SERPL-SCNC: 108 MMOL/L (ref 97–108)
CO2 SERPL-SCNC: 24 MMOL/L (ref 21–32)
COVID-19 RAPID TEST, COVR: NOT DETECTED
CREAT SERPL-MCNC: 0.88 MG/DL (ref 0.55–1.02)
DIFFERENTIAL METHOD BLD: ABNORMAL
EOSINOPHIL # BLD: 0 K/UL (ref 0–0.4)
EOSINOPHIL NFR BLD: 0 % (ref 0–7)
ERYTHROCYTE [DISTWIDTH] IN BLOOD BY AUTOMATED COUNT: 12.7 % (ref 11.5–14.5)
GLOBULIN SER CALC-MCNC: 4.2 G/DL (ref 2–4)
GLUCOSE SERPL-MCNC: 143 MG/DL (ref 65–100)
HCT VFR BLD AUTO: 26.2 % (ref 35–47)
HGB BLD-MCNC: 8.5 G/DL (ref 11.5–16)
IMM GRANULOCYTES # BLD AUTO: 0 K/UL (ref 0–0.04)
IMM GRANULOCYTES NFR BLD AUTO: 0 % (ref 0–0.5)
LACTATE SERPL-SCNC: 1.4 MMOL/L (ref 0.4–2)
LYMPHOCYTES # BLD: 1.6 K/UL (ref 0.8–3.5)
LYMPHOCYTES NFR BLD: 16 % (ref 12–49)
MCH RBC QN AUTO: 30.5 PG (ref 26–34)
MCHC RBC AUTO-ENTMCNC: 32.4 G/DL (ref 30–36.5)
MCV RBC AUTO: 93.9 FL (ref 80–99)
MONOCYTES # BLD: 0.6 K/UL (ref 0–1)
MONOCYTES NFR BLD: 6 % (ref 5–13)
NEUTS SEG # BLD: 7.6 K/UL (ref 1.8–8)
NEUTS SEG NFR BLD: 77 % (ref 32–75)
NRBC # BLD: 0 K/UL (ref 0–0.01)
NRBC BLD-RTO: 0 PER 100 WBC
PLATELET # BLD AUTO: 372 K/UL (ref 150–400)
PMV BLD AUTO: 10 FL (ref 8.9–12.9)
POTASSIUM SERPL-SCNC: 3.9 MMOL/L (ref 3.5–5.1)
PROT SERPL-MCNC: 6.7 G/DL (ref 6.4–8.2)
RBC # BLD AUTO: 2.79 M/UL (ref 3.8–5.2)
SODIUM SERPL-SCNC: 139 MMOL/L (ref 136–145)
SOURCE, COVRS: NORMAL
WBC # BLD AUTO: 9.8 K/UL (ref 3.6–11)

## 2022-02-04 PROCEDURE — 74011250636 HC RX REV CODE- 250/636: Performed by: NURSE ANESTHETIST, CERTIFIED REGISTERED

## 2022-02-04 PROCEDURE — P9045 ALBUMIN (HUMAN), 5%, 250 ML: HCPCS | Performed by: NURSE ANESTHETIST, CERTIFIED REGISTERED

## 2022-02-04 PROCEDURE — 83605 ASSAY OF LACTIC ACID: CPT

## 2022-02-04 PROCEDURE — 74011250636 HC RX REV CODE- 250/636: Performed by: NURSE PRACTITIONER

## 2022-02-04 PROCEDURE — 74011000250 HC RX REV CODE- 250: Performed by: NURSE ANESTHETIST, CERTIFIED REGISTERED

## 2022-02-04 PROCEDURE — 85025 COMPLETE CBC W/AUTO DIFF WBC: CPT

## 2022-02-04 PROCEDURE — C9113 INJ PANTOPRAZOLE SODIUM, VIA: HCPCS | Performed by: SURGERY

## 2022-02-04 PROCEDURE — 0DU607Z SUPPLEMENT STOMACH WITH AUTOLOGOUS TISSUE SUBSTITUTE, OPEN APPROACH: ICD-10-PCS | Performed by: SURGERY

## 2022-02-04 PROCEDURE — 36415 COLL VENOUS BLD VENIPUNCTURE: CPT

## 2022-02-04 PROCEDURE — 74011250636 HC RX REV CODE- 250/636: Performed by: STUDENT IN AN ORGANIZED HEALTH CARE EDUCATION/TRAINING PROGRAM

## 2022-02-04 PROCEDURE — 87040 BLOOD CULTURE FOR BACTERIA: CPT

## 2022-02-04 PROCEDURE — 74011000258 HC RX REV CODE- 258: Performed by: SURGERY

## 2022-02-04 PROCEDURE — 86900 BLOOD TYPING SEROLOGIC ABO: CPT

## 2022-02-04 PROCEDURE — 96375 TX/PRO/DX INJ NEW DRUG ADDON: CPT

## 2022-02-04 PROCEDURE — 87635 SARS-COV-2 COVID-19 AMP PRB: CPT

## 2022-02-04 PROCEDURE — 76210000063 HC OR PH I REC FIRST 0.5 HR: Performed by: SURGERY

## 2022-02-04 PROCEDURE — 76060000034 HC ANESTHESIA 1.5 TO 2 HR: Performed by: SURGERY

## 2022-02-04 PROCEDURE — 74011000258 HC RX REV CODE- 258: Performed by: NURSE PRACTITIONER

## 2022-02-04 PROCEDURE — 2709999900 HC NON-CHARGEABLE SUPPLY: Performed by: SURGERY

## 2022-02-04 PROCEDURE — 96374 THER/PROPH/DIAG INJ IV PUSH: CPT

## 2022-02-04 PROCEDURE — 77030020829: Performed by: SURGERY

## 2022-02-04 PROCEDURE — 77030040361 HC SLV COMPR DVT MDII -B

## 2022-02-04 PROCEDURE — 77030008608 HC TRCR ENDOSC SMTH AMR -B: Performed by: SURGERY

## 2022-02-04 PROCEDURE — 74011250636 HC RX REV CODE- 250/636: Performed by: SURGERY

## 2022-02-04 PROCEDURE — 74011000250 HC RX REV CODE- 250: Performed by: SURGERY

## 2022-02-04 PROCEDURE — 77030010507 HC ADH SKN DERMBND J&J -B: Performed by: SURGERY

## 2022-02-04 PROCEDURE — 77030035029 HC NDL INSUF VERES DISP COVD -B: Performed by: SURGERY

## 2022-02-04 PROCEDURE — 74177 CT ABD & PELVIS W/CONTRAST: CPT

## 2022-02-04 PROCEDURE — 76010000153 HC OR TIME 1.5 TO 2 HR: Performed by: SURGERY

## 2022-02-04 PROCEDURE — 80053 COMPREHEN METABOLIC PANEL: CPT

## 2022-02-04 PROCEDURE — 86923 COMPATIBILITY TEST ELECTRIC: CPT

## 2022-02-04 PROCEDURE — 99285 EMERGENCY DEPT VISIT HI MDM: CPT

## 2022-02-04 PROCEDURE — 74011000636 HC RX REV CODE- 636: Performed by: STUDENT IN AN ORGANIZED HEALTH CARE EDUCATION/TRAINING PROGRAM

## 2022-02-04 PROCEDURE — 77030012770 HC TRCR OPT FX AMR -B: Performed by: SURGERY

## 2022-02-04 PROCEDURE — 74019 RADEX ABDOMEN 2 VIEWS: CPT

## 2022-02-04 PROCEDURE — 77030031139 HC SUT VCRL2 J&J -A: Performed by: SURGERY

## 2022-02-04 PROCEDURE — 65270000029 HC RM PRIVATE

## 2022-02-04 RX ORDER — SODIUM CHLORIDE, SODIUM LACTATE, POTASSIUM CHLORIDE, CALCIUM CHLORIDE 600; 310; 30; 20 MG/100ML; MG/100ML; MG/100ML; MG/100ML
INJECTION, SOLUTION INTRAVENOUS
Status: DISCONTINUED | OUTPATIENT
Start: 2022-02-04 | End: 2022-02-04 | Stop reason: HOSPADM

## 2022-02-04 RX ORDER — ENOXAPARIN SODIUM 100 MG/ML
40 INJECTION SUBCUTANEOUS EVERY 24 HOURS
Status: DISCONTINUED | OUTPATIENT
Start: 2022-02-05 | End: 2022-02-10

## 2022-02-04 RX ORDER — MORPHINE SULFATE 2 MG/ML
2 INJECTION, SOLUTION INTRAMUSCULAR; INTRAVENOUS
Status: DISCONTINUED | OUTPATIENT
Start: 2022-02-04 | End: 2022-02-08

## 2022-02-04 RX ORDER — SODIUM CHLORIDE, SODIUM LACTATE, POTASSIUM CHLORIDE, CALCIUM CHLORIDE 600; 310; 30; 20 MG/100ML; MG/100ML; MG/100ML; MG/100ML
125 INJECTION, SOLUTION INTRAVENOUS CONTINUOUS
Status: DISCONTINUED | OUTPATIENT
Start: 2022-02-04 | End: 2022-02-07

## 2022-02-04 RX ORDER — NEOSTIGMINE METHYLSULFATE 1 MG/ML
INJECTION, SOLUTION INTRAVENOUS AS NEEDED
Status: DISCONTINUED | OUTPATIENT
Start: 2022-02-04 | End: 2022-02-04 | Stop reason: HOSPADM

## 2022-02-04 RX ORDER — DIPHENHYDRAMINE HYDROCHLORIDE 50 MG/ML
12.5 INJECTION, SOLUTION INTRAMUSCULAR; INTRAVENOUS AS NEEDED
Status: DISCONTINUED | OUTPATIENT
Start: 2022-02-04 | End: 2022-02-04 | Stop reason: HOSPADM

## 2022-02-04 RX ORDER — FAMOTIDINE 10 MG/ML
INJECTION INTRAVENOUS AS NEEDED
Status: DISCONTINUED | OUTPATIENT
Start: 2022-02-04 | End: 2022-02-04 | Stop reason: HOSPADM

## 2022-02-04 RX ORDER — FENTANYL CITRATE 50 UG/ML
INJECTION, SOLUTION INTRAMUSCULAR; INTRAVENOUS AS NEEDED
Status: DISCONTINUED | OUTPATIENT
Start: 2022-02-04 | End: 2022-02-04 | Stop reason: HOSPADM

## 2022-02-04 RX ORDER — LIDOCAINE HYDROCHLORIDE 20 MG/ML
INJECTION, SOLUTION EPIDURAL; INFILTRATION; INTRACAUDAL; PERINEURAL AS NEEDED
Status: DISCONTINUED | OUTPATIENT
Start: 2022-02-04 | End: 2022-02-04 | Stop reason: HOSPADM

## 2022-02-04 RX ORDER — SODIUM CHLORIDE, SODIUM LACTATE, POTASSIUM CHLORIDE, CALCIUM CHLORIDE 600; 310; 30; 20 MG/100ML; MG/100ML; MG/100ML; MG/100ML
125 INJECTION, SOLUTION INTRAVENOUS CONTINUOUS
Status: DISCONTINUED | OUTPATIENT
Start: 2022-02-04 | End: 2022-02-04 | Stop reason: HOSPADM

## 2022-02-04 RX ORDER — MORPHINE SULFATE 1 MG/ML
2 INJECTION, SOLUTION EPIDURAL; INTRATHECAL; INTRAVENOUS
Status: DISCONTINUED | OUTPATIENT
Start: 2022-02-04 | End: 2022-02-04 | Stop reason: CLARIF

## 2022-02-04 RX ORDER — ONDANSETRON 2 MG/ML
INJECTION INTRAMUSCULAR; INTRAVENOUS AS NEEDED
Status: DISCONTINUED | OUTPATIENT
Start: 2022-02-04 | End: 2022-02-04 | Stop reason: HOSPADM

## 2022-02-04 RX ORDER — PROPOFOL 10 MG/ML
INJECTION, EMULSION INTRAVENOUS AS NEEDED
Status: DISCONTINUED | OUTPATIENT
Start: 2022-02-04 | End: 2022-02-04 | Stop reason: HOSPADM

## 2022-02-04 RX ORDER — ALBUMIN HUMAN 50 G/1000ML
SOLUTION INTRAVENOUS AS NEEDED
Status: DISCONTINUED | OUTPATIENT
Start: 2022-02-04 | End: 2022-02-04 | Stop reason: HOSPADM

## 2022-02-04 RX ORDER — SUCCINYLCHOLINE CHLORIDE 20 MG/ML
INJECTION INTRAMUSCULAR; INTRAVENOUS AS NEEDED
Status: DISCONTINUED | OUTPATIENT
Start: 2022-02-04 | End: 2022-02-04 | Stop reason: HOSPADM

## 2022-02-04 RX ORDER — GLYCOPYRROLATE 0.2 MG/ML
INJECTION INTRAMUSCULAR; INTRAVENOUS AS NEEDED
Status: DISCONTINUED | OUTPATIENT
Start: 2022-02-04 | End: 2022-02-04 | Stop reason: HOSPADM

## 2022-02-04 RX ORDER — MIDAZOLAM HYDROCHLORIDE 1 MG/ML
INJECTION, SOLUTION INTRAMUSCULAR; INTRAVENOUS AS NEEDED
Status: DISCONTINUED | OUTPATIENT
Start: 2022-02-04 | End: 2022-02-04 | Stop reason: HOSPADM

## 2022-02-04 RX ORDER — ONDANSETRON 2 MG/ML
4 INJECTION INTRAMUSCULAR; INTRAVENOUS AS NEEDED
Status: DISCONTINUED | OUTPATIENT
Start: 2022-02-04 | End: 2022-02-04 | Stop reason: HOSPADM

## 2022-02-04 RX ORDER — FLUCONAZOLE 2 MG/ML
400 INJECTION, SOLUTION INTRAVENOUS EVERY 24 HOURS
Status: DISCONTINUED | OUTPATIENT
Start: 2022-02-04 | End: 2022-02-17 | Stop reason: HOSPADM

## 2022-02-04 RX ORDER — BUPIVACAINE HYDROCHLORIDE 5 MG/ML
INJECTION, SOLUTION EPIDURAL; INTRACAUDAL AS NEEDED
Status: DISCONTINUED | OUTPATIENT
Start: 2022-02-04 | End: 2022-02-04 | Stop reason: HOSPADM

## 2022-02-04 RX ORDER — ALBUTEROL SULFATE 0.83 MG/ML
2.5 SOLUTION RESPIRATORY (INHALATION) AS NEEDED
Status: DISCONTINUED | OUTPATIENT
Start: 2022-02-04 | End: 2022-02-04 | Stop reason: HOSPADM

## 2022-02-04 RX ORDER — HYDROMORPHONE HYDROCHLORIDE 1 MG/ML
0.5 INJECTION, SOLUTION INTRAMUSCULAR; INTRAVENOUS; SUBCUTANEOUS
Status: DISCONTINUED | OUTPATIENT
Start: 2022-02-04 | End: 2022-02-04 | Stop reason: HOSPADM

## 2022-02-04 RX ORDER — SODIUM CHLORIDE 9 MG/ML
100 INJECTION, SOLUTION INTRAVENOUS CONTINUOUS
Status: DISCONTINUED | OUTPATIENT
Start: 2022-02-04 | End: 2022-02-07

## 2022-02-04 RX ORDER — HYDROMORPHONE HYDROCHLORIDE 1 MG/ML
0.5 INJECTION, SOLUTION INTRAMUSCULAR; INTRAVENOUS; SUBCUTANEOUS
Status: COMPLETED | OUTPATIENT
Start: 2022-02-04 | End: 2022-02-04

## 2022-02-04 RX ORDER — SODIUM CHLORIDE, SODIUM LACTATE, POTASSIUM CHLORIDE, CALCIUM CHLORIDE 600; 310; 30; 20 MG/100ML; MG/100ML; MG/100ML; MG/100ML
150 INJECTION, SOLUTION INTRAVENOUS CONTINUOUS
Status: DISCONTINUED | OUTPATIENT
Start: 2022-02-04 | End: 2022-02-04 | Stop reason: HOSPADM

## 2022-02-04 RX ORDER — ROCURONIUM BROMIDE 10 MG/ML
INJECTION, SOLUTION INTRAVENOUS AS NEEDED
Status: DISCONTINUED | OUTPATIENT
Start: 2022-02-04 | End: 2022-02-04 | Stop reason: HOSPADM

## 2022-02-04 RX ADMIN — GLYCOPYRROLATE 0.4 MG: 0.2 INJECTION INTRAMUSCULAR; INTRAVENOUS at 17:53

## 2022-02-04 RX ADMIN — FENTANYL CITRATE 50 MCG: 50 INJECTION INTRAMUSCULAR; INTRAVENOUS at 17:57

## 2022-02-04 RX ADMIN — SODIUM CHLORIDE, POTASSIUM CHLORIDE, SODIUM LACTATE AND CALCIUM CHLORIDE: 600; 310; 30; 20 INJECTION, SOLUTION INTRAVENOUS at 16:00

## 2022-02-04 RX ADMIN — PIPERACILLIN AND TAZOBACTAM 3.38 G: 3; .375 INJECTION, POWDER, LYOPHILIZED, FOR SOLUTION INTRAVENOUS at 21:19

## 2022-02-04 RX ADMIN — SODIUM CHLORIDE, PRESERVATIVE FREE 40 MG: 5 INJECTION INTRAVENOUS at 21:19

## 2022-02-04 RX ADMIN — ONDANSETRON HYDROCHLORIDE 4 MG: 2 SOLUTION INTRAMUSCULAR; INTRAVENOUS at 16:57

## 2022-02-04 RX ADMIN — FENTANYL CITRATE 100 MCG: 50 INJECTION INTRAMUSCULAR; INTRAVENOUS at 16:46

## 2022-02-04 RX ADMIN — SODIUM CHLORIDE 100 ML/HR: 9 INJECTION, SOLUTION INTRAVENOUS at 18:49

## 2022-02-04 RX ADMIN — ROCURONIUM BROMIDE 35 MG: 10 INJECTION INTRAVENOUS at 16:56

## 2022-02-04 RX ADMIN — IOPAMIDOL 100 ML: 755 INJECTION, SOLUTION INTRAVENOUS at 13:46

## 2022-02-04 RX ADMIN — ROCURONIUM BROMIDE 10 MG: 10 INJECTION INTRAVENOUS at 17:13

## 2022-02-04 RX ADMIN — SODIUM CHLORIDE, POTASSIUM CHLORIDE, SODIUM LACTATE AND CALCIUM CHLORIDE: 600; 310; 30; 20 INJECTION, SOLUTION INTRAVENOUS at 17:15

## 2022-02-04 RX ADMIN — SUCCINYLCHOLINE CHLORIDE 100 MG: 20 INJECTION, SOLUTION INTRAMUSCULAR; INTRAVENOUS at 16:46

## 2022-02-04 RX ADMIN — PROPOFOL 140 MG: 10 INJECTION, EMULSION INTRAVENOUS at 16:46

## 2022-02-04 RX ADMIN — SODIUM CHLORIDE, SODIUM LACTATE, POTASSIUM CHLORIDE, CALCIUM CHLORIDE: 600; 310; 30; 20 INJECTION, SOLUTION INTRAVENOUS at 16:56

## 2022-02-04 RX ADMIN — ROCURONIUM BROMIDE 5 MG: 10 INJECTION INTRAVENOUS at 16:46

## 2022-02-04 RX ADMIN — LIDOCAINE HYDROCHLORIDE 50 MG: 20 INJECTION, SOLUTION EPIDURAL; INFILTRATION; INTRACAUDAL; PERINEURAL at 16:46

## 2022-02-04 RX ADMIN — PIPERACILLIN AND TAZOBACTAM 3.38 G: 3; .375 INJECTION, POWDER, FOR SOLUTION INTRAVENOUS at 15:05

## 2022-02-04 RX ADMIN — FAMOTIDINE 20 MG: 10 INJECTION INTRAVENOUS at 17:00

## 2022-02-04 RX ADMIN — MIDAZOLAM 2 MG: 1 INJECTION, SOLUTION INTRAMUSCULAR; INTRAVENOUS at 16:40

## 2022-02-04 RX ADMIN — SODIUM CHLORIDE 1000 ML: 9 INJECTION, SOLUTION INTRAVENOUS at 11:31

## 2022-02-04 RX ADMIN — FENTANYL CITRATE 50 MCG: 50 INJECTION INTRAMUSCULAR; INTRAVENOUS at 16:57

## 2022-02-04 RX ADMIN — FENTANYL CITRATE 50 MCG: 50 INJECTION INTRAMUSCULAR; INTRAVENOUS at 18:04

## 2022-02-04 RX ADMIN — Medication 3 MG: at 17:53

## 2022-02-04 RX ADMIN — FLUCONAZOLE 400 MG: 2 INJECTION, SOLUTION INTRAVENOUS at 21:19

## 2022-02-04 RX ADMIN — HYDROMORPHONE HYDROCHLORIDE 0.5 MG: 1 INJECTION, SOLUTION INTRAMUSCULAR; INTRAVENOUS; SUBCUTANEOUS at 15:20

## 2022-02-04 RX ADMIN — SODIUM CHLORIDE, SODIUM LACTATE, POTASSIUM CHLORIDE, CALCIUM CHLORIDE: 600; 310; 30; 20 INJECTION, SOLUTION INTRAVENOUS at 17:15

## 2022-02-04 RX ADMIN — ALBUMIN (HUMAN) 250 ML: 12.5 SOLUTION INTRAVENOUS at 17:36

## 2022-02-04 NOTE — ED TRIAGE NOTES
Pt presents to ER with c/o severe abdominal pain s/p right hip replacement 5 days ago. Pt states taking oxycodone, tylenol and advil for pain. No BM in 4 days except small amount of liquid stool this am. Pt denies issues with right hip replacement, ambulating with walker at home. Only c/o generalized abdominal pain and nausea. No dysuria, no fevers, no chills. + lack of appetite for 48 hours.

## 2022-02-04 NOTE — ANESTHESIA PREPROCEDURE EVALUATION
Relevant Problems   No relevant active problems       Anesthetic History   No history of anesthetic complications            Review of Systems / Medical History  Patient summary reviewed, nursing notes reviewed and pertinent labs reviewed    Pulmonary          Undiagnosed apnea         Neuro/Psych         Psychiatric history    Comments: Anxiety/depression Cardiovascular    Hypertension              Exercise tolerance: >4 METS     GI/Hepatic/Renal           PUD     Endo/Other        Obesity, arthritis and anemia     Other Findings              Physical Exam    Airway  Mallampati: III  TM Distance: 4 - 6 cm  Neck ROM: normal range of motion   Mouth opening: Normal     Cardiovascular  Regular rate and rhythm,  S1 and S2 normal,  no murmur, click, rub, or gallop  Rhythm: regular  Rate: abnormal         Dental    Dentition: Lower dentition intact and Upper dentition intact     Pulmonary  Breath sounds clear to auscultation               Abdominal         Other Findings            Anesthetic Plan    ASA: 3, emergent  Anesthesia type: general          Induction: Intravenous and RSI  Anesthetic plan and risks discussed with: Patient      Informed consent obtained.

## 2022-02-04 NOTE — H&P
Surgery History and Physical    Subjective:      Oscar Linn is a 75 y.o. female who is s/p right hip replacement on 1/31/22 by Dr. Netta Denny. She came to ED due to abdominal pain and constipation. She was getting some oral agents and enemas to help with constipation but abdominal pain started getting worse and so CT was done which shows free air from a gastric perforation. She reports she has been taking NSAIDs for over a year for her hip pain. She denies having an EGD in the past.  Denies any abdominal surgery. Past Medical History:   Diagnosis Date    Anxiety     COVID-19 vaccine administered     boostered   Pfizer    White coat syndrome without diagnosis of hypertension      Past Surgical History:   Procedure Laterality Date    HX COLONOSCOPY  09/2021    HX DILATION AND CURETTAGE  1990      Family History   Problem Relation Age of Onset    Clotting Disorder Neg Hx      Social History     Socioeconomic History    Marital status:    Tobacco Use    Smoking status: Never Smoker    Smokeless tobacco: Never Used   Substance and Sexual Activity    Alcohol use: Yes     Comment: social    Drug use: Never      Current Facility-Administered Medications   Medication Dose Route Frequency    piperacillin-tazobactam (ZOSYN) 3.375 g in 0.9% sodium chloride (MBP/ADV) 100 mL MBP  3.375 g IntraVENous ONCE    0.9% sodium chloride infusion  100 mL/hr IntraVENous CONTINUOUS     Current Outpatient Medications   Medication Sig    aspirin delayed-release 81 mg tablet Take 1 Tablet by mouth two (2) times a day.  ibuprofen (MOTRIN) 800 mg tablet Take 1 Tablet by mouth every six (6) hours as needed for Pain.  oxyCODONE IR (ROXICODONE) 5 mg immediate release tablet Take 1 Tablet by mouth every four (4) hours as needed for Pain for up to 7 days. Max Daily Amount: 30 mg. Indications: pain    traMADoL (ULTRAM) 50 mg tablet Take 1 Tablet by mouth every six (6) hours as needed for Pain for up to 7 days.  Max Daily Amount: 200 mg.  acetaminophen (Acetaminophen Pain Relief) 500 mg tablet Take 2 Tablets by mouth every six (6) hours as needed for Pain.  ondansetron (ZOFRAN ODT) 8 mg disintegrating tablet Take 0.5 Tablets by mouth every eight (8) hours as needed for Nausea.  efinaconazole (Jublia) rosendo topical solution Apply  to affected area daily.  multivitamin (ONE A DAY) tablet Take 1 Tablet by mouth daily. No Known Allergies    Review of Systems:REVIEW OF SYSTEMS:     []     Unable to obtain  ROS due to  []    mental status change  []    sedated   []    intubated   [x]    Total of 12 systems reviewed as follows:    Constitutional: neg for fevers, chills, weight loss, malaise  Eyes: negative for blurry vision or double vision  Ears, nose, mouth, throat, and face: negative for sore throat, negative for lip swelling  Respiratory: negative for SOB and cough  Cardiovascular: negative for CP, negative for palpitations  Gastrointestinal: positive for nausea, vomiting, abdominal pain, constipation   denies diarrhea, , melena, hematochezia  Genitourinary: negative for dysuria  Integument/breast: negative for skin rash  Hematologic/lymphatic: negative for bruising  Musculoskeletal: negative for muscle aches  Neurological: no dizziness or h/a    Objective:        Patient Vitals for the past 8 hrs:   BP Temp Pulse Resp SpO2 Height Weight   22 1521 124/63 -- (!) 123 (!) 50 97 % -- --   22 1330 (!) 138/50 -- -- -- -- -- --   22 1222 (!) 150/62 -- -- -- 100 % -- --   22 1035 (!) 118/59 98.7 °F (37.1 °C) (!) 111 20 100 % 5' 6\" (1.676 m) 86.2 kg (190 lb)       Temp (24hrs), Av.7 °F (37.1 °C), Min:98.7 °F (37.1 °C), Max:98.7 °F (37.1 °C)      Physical Exam:  General:  Alert, cooperative, no distress, appears stated age. Eyes:  Conjunctivae/corneas clear.     Nose: Nares normal. Septum midline   Mouth/Throat: Lips, mucosa, and tongue normal.    Neck: Supple, symmetrical, trachea midline Lungs:   Clear to auscultation bilaterally. Heart:  Regular rate and rhythm   Abdomen:   Soft, tender to palpation upper abdomen and mid abdomen, some distention mainly upper abdomen  C/D/I dressing right hip and LLQ abdomen from surgery   Extremities: Extremities normal, atraumatic, no cyanosis or edema. Skin: Skin color, texture, turgor normal. No rashes or lesions   Neuro: Alert, oriented, speech clear     Labs:   Recent Labs     02/04/22  1055   WBC 9.8   HGB 8.5*   HCT 26.2*        Recent Labs     02/04/22  1055      K 3.9      CO2 24   *   BUN 30*   CREA 0.88   CA 8.9   ALB 2.5*   TBILI 0.3   ALT 27     No results for input(s): INR, INREXT in the last 72 hours. FINDINGS:   LOWER THORAX: No significant abnormality in the incidentally imaged lower chest.  LIVER: No mass. BILIARY TREE: Gallbladder is within normal limits. CBD is not dilated. SPLEEN: within normal limits. PANCREAS: No mass or ductal dilatation. ADRENALS: Unremarkable. KIDNEYS: 17 mm left parapelvic cyst, no follow-up required. No renal mass or  hydronephrosis. STOMACH: Antral wall thickening is noted with associated inflammatory change. There is evidence of a defect in the lesser curvature compatible with perforated  ulcer. SMALL BOWEL: No dilatation or wall thickening. COLON: No dilatation or wall thickening. APPENDIX: Within normal limits. PERITONEUM: Significant free intraperitoneal air and mild free fluid. RETROPERITONEUM: No lymphadenopathy or aortic aneurysm. REPRODUCTIVE ORGANS: There is a 3.4 cm left ovarian cyst.  URINARY BLADDER: No mass or calculus. BONES: The patient is status post right total hip replacement. Postoperative  changes are seen in the right hip soft tissues. Degenerative changes are seen in  the lumbar spine. ABDOMINAL WALL: No mass or hernia.   ADDITIONAL COMMENTS: N/A     IMPRESSION  Findings compatible with gastric perforation, with free intraperitoneal air and  fluid. Assessment and Plan:     76 y/o F s/p Right hip replacement on 1/31/22 now with perforated gastric ulcer  Patient for OR for diagnostic lap.   Explained procedure to patient with post op care  All questions answered  Spoke to  as well      Signed By: Doc Valdovinos MD     February 4, 2022

## 2022-02-04 NOTE — TELEPHONE ENCOUNTER
Patient called with complaints of abdominal pain, attempted to have BM, pain is higher, patient also complaining of shortness of breath that started 10 mins ago, also spoke with , reviewed signs of pulmonary embolus, he will call Dr. Neha Ruiz and seek urgent care for his wife.

## 2022-02-04 NOTE — BRIEF OP NOTE
Brief Postoperative Note    Patient: Bowen Mondragon  YOB: 1955  MRN: [id number]    Date of Procedure: 2/4/2022     Pre-Op Diagnosis: Gastric perforation    Post-Op Diagnosis: Same as preoperative diagnosis. Procedure(s):  LAPAROSCOPY GENERAL DIAGNOSTIC, CONVERTED TO OPEN, LYSIS OF ADHESIONS, OVERSEWED PERFORATED ULCER, katya patch    Surgeon(s):  Isrrael Zaldivar MD    Surgical Assistant: Surg Asst-1: Devon Kennedy; Seema Thompson    Anesthesia: General     Estimated Blood Loss (mL): Minimal    Complications: None    Specimens: none    Implants: none  Drains:   Nasogastric Tube 02/04/22 (Active)       Minor Lathe #1 02/04/22 Left Abdomen (Active)   Site Assessment Clean, dry, & intact 02/04/22 1759   Dressing Status Clean, dry, & intact 02/04/22 1759   Status Patent;Draining; Charged 02/04/22 1759       Findings: pre-pyloric perforated gastric ulcer  Approximated edges and also did omental patch. Did not appear like a fresh perforation.   Left liver lobe was covering perforation    Electronically Signed by Beatris Moore MD on 2/4/2022 at 6:14 PM

## 2022-02-04 NOTE — ED PROVIDER NOTES
This is a 74-year-old female who presents to the emergency room with complaints of constipation postoperatively. Patient states she had surgery on Monday by Dr. Aram Wills having a right total hip. Patient states despite the use of MiraLAX, Colace, Dulcolax she has not had a bowel movement. Patient is denying any chest pain, shortness of breath, dizziness. Positive nausea, no vomiting. No fevers or chills. Is tachycardic in triage stating that she was very uncomfortable in her abdomen. Positive distention. Adds that she has been able to pass a few small \"balls of stool. \" Does state she has been taking pain medication since surgery with the last dose being two days ago when she felt herself becoming constipated. There are no further complaints at this time. Tray Kirk MD  Past Medical History:  No date: Anxiety  No date: COVID-19 vaccine administered      Comment:  boostered   Pfizer  No date:  White coat syndrome without diagnosis of hypertension  Past Surgical History:  09/2021: HX COLONOSCOPY  1990: HX DILATION AND CURETTAGE             Past Medical History:   Diagnosis Date    Anxiety     COVID-19 vaccine administered     boostered   Pfizer    White coat syndrome without diagnosis of hypertension        Past Surgical History:   Procedure Laterality Date    HX COLONOSCOPY  09/2021    HX DILATION AND CURETTAGE  1990         Family History:   Problem Relation Age of Onset    Clotting Disorder Neg Hx        Social History     Socioeconomic History    Marital status:      Spouse name: Not on file    Number of children: Not on file    Years of education: Not on file    Highest education level: Not on file   Occupational History    Not on file   Tobacco Use    Smoking status: Never Smoker    Smokeless tobacco: Never Used   Substance and Sexual Activity    Alcohol use: Yes     Comment: social    Drug use: Never    Sexual activity: Not on file   Other Topics Concern    Not on file   Social History Narrative    Not on file     Social Determinants of Health     Financial Resource Strain:     Difficulty of Paying Living Expenses: Not on file   Food Insecurity:     Worried About Running Out of Food in the Last Year: Not on file    Chuck of Food in the Last Year: Not on file   Transportation Needs:     Lack of Transportation (Medical): Not on file    Lack of Transportation (Non-Medical): Not on file   Physical Activity:     Days of Exercise per Week: Not on file    Minutes of Exercise per Session: Not on file   Stress:     Feeling of Stress : Not on file   Social Connections:     Frequency of Communication with Friends and Family: Not on file    Frequency of Social Gatherings with Friends and Family: Not on file    Attends [employer] Services: Not on file    Active Member of [address] [place] [address] [employer] or Organizations: Not on file    Attends Club or Organization Meetings: Not on file    Marital Status: Not on file   Intimate Partner Violence:     Fear of Current or Ex-Partner: Not on file    Emotionally Abused: Not on file    Physically Abused: Not on file    Sexually Abused: Not on file   Housing Stability:     Unable to Pay for Housing in the Last Year: Not on file    Number of [place] in the Last Year: Not on file    Unstable Housing in the Last Year: Not on file         ALLERGIES: Patient has no known allergies. Review of Systems   Constitutional: Negative for appetite change, chills, diaphoresis, fatigue and fever. HENT: Negative for congestion, ear discharge, ear pain, sinus pressure, sinus pain, sore throat and trouble swallowing. Eyes: Negative for photophobia, pain, redness and visual disturbance. Respiratory: Negative for chest tightness, shortness of breath and wheezing. Cardiovascular: Negative for chest pain and palpitations. Gastrointestinal: Positive for abdominal distention, constipation and nausea. Negative for abdominal pain and vomiting. Endocrine: Negative. Genitourinary: Negative for difficulty urinating, flank pain, frequency and urgency. Musculoskeletal: Negative for back pain, neck pain and neck stiffness. Skin: Negative for color change, pallor, rash and wound. Allergic/Immunologic: Negative. Neurological: Negative for dizziness, speech difficulty, weakness and headaches. Hematological: Does not bruise/bleed easily. Psychiatric/Behavioral: Negative for behavioral problems. The patient is not nervous/anxious. Vitals:    02/04/22 1035   BP: (!) 118/59   Pulse: (!) 111   Resp: 20   Temp: 98.7 °F (37.1 °C)   SpO2: 100%   Weight: 86.2 kg (190 lb)   Height: 5' 6\" (1.676 m)            Physical Exam  Vitals and nursing note reviewed. Constitutional:       General: She is not in acute distress. Appearance: Normal appearance. She is well-developed. She is not ill-appearing. HENT:      Head: Normocephalic and atraumatic. Right Ear: External ear normal.      Left Ear: External ear normal.      Nose: Nose normal. No congestion. Mouth/Throat:      Mouth: Mucous membranes are moist.   Eyes:      General:         Right eye: No discharge. Left eye: No discharge. Conjunctiva/sclera: Conjunctivae normal.      Pupils: Pupils are equal, round, and reactive to light. Neck:      Vascular: No JVD. Trachea: No tracheal deviation. Cardiovascular:      Rate and Rhythm: Regular rhythm. Tachycardia present. Pulses: Normal pulses. Heart sounds: Normal heart sounds. No murmur heard. No gallop. Pulmonary:      Effort: Pulmonary effort is normal. No respiratory distress. Breath sounds: Normal breath sounds. No wheezing or rales. Chest:      Chest wall: No tenderness. Abdominal:      General: Bowel sounds are normal. There is distension. Palpations: Abdomen is soft. Tenderness: There is no abdominal tenderness. There is guarding. There is no rebound.       Comments: No bowel movement for five days Genitourinary:     Comments: Negative urinary symptoms, positive constipation. Musculoskeletal:         General: No tenderness. Normal range of motion. Cervical back: Normal range of motion and neck supple. Skin:     General: Skin is warm and dry. Capillary Refill: Capillary refill takes less than 2 seconds. Coloration: Skin is not pale. Findings: No erythema or rash. Neurological:      General: No focal deficit present. Mental Status: She is alert and oriented to person, place, and time. Motor: No weakness. Coordination: Coordination normal.   Psychiatric:         Mood and Affect: Mood normal.         Behavior: Behavior normal.         Thought Content: Thought content normal.         Judgment: Judgment normal.          MDM  Number of Diagnoses or Management Options  Diagnosis management comments: Differential diagnosis includes constipation and others. After physical assessment and review of imaging and laboratory data, soapsuds enema ordered. Patient continues with pain but no bowel movement. Tachycardic. CT of the abdomen and pelvis ordered. Noted to have a gastric perforation. General surgery consulted and patient to the operating room. Discussed with Dr. Claudene Luke who is in agreement with plan of care. Amount and/or Complexity of Data Reviewed  Clinical lab tests: ordered and reviewed  Tests in the radiology section of CPT®: ordered and reviewed  Discuss the patient with other providers: yes (Dr. Claudene Luke  )         1:25 Pm  Spoke with Dr. Floresita Nunes regarding patient in the ED.     1424:   Spoke with general surgery who will take patient to the operating room secondary to gastric perforation. Patient in agreement with plan of care as this was discussed by Dr. Claudene Luke with patient.     Procedures

## 2022-02-05 LAB
ANION GAP SERPL CALC-SCNC: 6 MMOL/L (ref 5–15)
BASOPHILS # BLD: 0 K/UL (ref 0–0.1)
BASOPHILS NFR BLD: 0 % (ref 0–1)
BUN SERPL-MCNC: 22 MG/DL (ref 6–20)
BUN/CREAT SERPL: 31 (ref 12–20)
CALCIUM SERPL-MCNC: 8.1 MG/DL (ref 8.5–10.1)
CHLORIDE SERPL-SCNC: 114 MMOL/L (ref 97–108)
CO2 SERPL-SCNC: 23 MMOL/L (ref 21–32)
CREAT SERPL-MCNC: 0.71 MG/DL (ref 0.55–1.02)
DIFFERENTIAL METHOD BLD: ABNORMAL
EOSINOPHIL # BLD: 0 K/UL (ref 0–0.4)
EOSINOPHIL NFR BLD: 0 % (ref 0–7)
ERYTHROCYTE [DISTWIDTH] IN BLOOD BY AUTOMATED COUNT: 12.9 % (ref 11.5–14.5)
GLUCOSE SERPL-MCNC: 124 MG/DL (ref 65–100)
HCT VFR BLD AUTO: 21.5 % (ref 35–47)
HGB BLD-MCNC: 7.2 G/DL (ref 11.5–16)
IMM GRANULOCYTES # BLD AUTO: 0 K/UL
IMM GRANULOCYTES NFR BLD AUTO: 0 %
LYMPHOCYTES # BLD: 0.6 K/UL (ref 0.8–3.5)
LYMPHOCYTES NFR BLD: 10 % (ref 12–49)
MCH RBC QN AUTO: 30.9 PG (ref 26–34)
MCHC RBC AUTO-ENTMCNC: 33.5 G/DL (ref 30–36.5)
MCV RBC AUTO: 92.3 FL (ref 80–99)
MONOCYTES # BLD: 0.5 K/UL (ref 0–1)
MONOCYTES NFR BLD: 8 % (ref 5–13)
NEUTS BAND NFR BLD MANUAL: 34 % (ref 0–6)
NEUTS SEG # BLD: 5 K/UL (ref 1.8–8)
NEUTS SEG NFR BLD: 48 % (ref 32–75)
NRBC # BLD: 0 K/UL (ref 0–0.01)
NRBC BLD-RTO: 0 PER 100 WBC
PLATELET # BLD AUTO: 283 K/UL (ref 150–400)
PMV BLD AUTO: 9.7 FL (ref 8.9–12.9)
POTASSIUM SERPL-SCNC: 4 MMOL/L (ref 3.5–5.1)
RBC # BLD AUTO: 2.33 M/UL (ref 3.8–5.2)
RBC MORPH BLD: ABNORMAL
SODIUM SERPL-SCNC: 143 MMOL/L (ref 136–145)
WBC # BLD AUTO: 6.1 K/UL (ref 3.6–11)

## 2022-02-05 PROCEDURE — 97110 THERAPEUTIC EXERCISES: CPT

## 2022-02-05 PROCEDURE — 97161 PT EVAL LOW COMPLEX 20 MIN: CPT

## 2022-02-05 PROCEDURE — 74011000258 HC RX REV CODE- 258: Performed by: SURGERY

## 2022-02-05 PROCEDURE — 74011000250 HC RX REV CODE- 250: Performed by: SURGERY

## 2022-02-05 PROCEDURE — 94761 N-INVAS EAR/PLS OXIMETRY MLT: CPT

## 2022-02-05 PROCEDURE — 74011250636 HC RX REV CODE- 250/636: Performed by: SURGERY

## 2022-02-05 PROCEDURE — 2709999900 HC NON-CHARGEABLE SUPPLY

## 2022-02-05 PROCEDURE — 97116 GAIT TRAINING THERAPY: CPT

## 2022-02-05 PROCEDURE — C9113 INJ PANTOPRAZOLE SODIUM, VIA: HCPCS | Performed by: SURGERY

## 2022-02-05 PROCEDURE — 74011250636 HC RX REV CODE- 250/636: Performed by: STUDENT IN AN ORGANIZED HEALTH CARE EDUCATION/TRAINING PROGRAM

## 2022-02-05 PROCEDURE — 85025 COMPLETE CBC W/AUTO DIFF WBC: CPT

## 2022-02-05 PROCEDURE — 51798 US URINE CAPACITY MEASURE: CPT

## 2022-02-05 PROCEDURE — 65270000029 HC RM PRIVATE

## 2022-02-05 PROCEDURE — 80048 BASIC METABOLIC PNL TOTAL CA: CPT

## 2022-02-05 PROCEDURE — 77010033678 HC OXYGEN DAILY

## 2022-02-05 PROCEDURE — 77030038269 HC DRN EXT URIN PURWCK BARD -A

## 2022-02-05 PROCEDURE — 36415 COLL VENOUS BLD VENIPUNCTURE: CPT

## 2022-02-05 RX ORDER — FACIAL-BODY WIPES
10 EACH TOPICAL DAILY
Status: DISCONTINUED | OUTPATIENT
Start: 2022-02-06 | End: 2022-02-09

## 2022-02-05 RX ORDER — LORAZEPAM 2 MG/ML
1 INJECTION INTRAMUSCULAR
Status: DISCONTINUED | OUTPATIENT
Start: 2022-02-05 | End: 2022-02-05

## 2022-02-05 RX ORDER — LORAZEPAM 0.5 MG/1
0.5 TABLET ORAL
Status: DISCONTINUED | OUTPATIENT
Start: 2022-02-05 | End: 2022-02-05

## 2022-02-05 RX ORDER — LORAZEPAM 2 MG/ML
0.5 INJECTION INTRAMUSCULAR
Status: DISCONTINUED | OUTPATIENT
Start: 2022-02-05 | End: 2022-02-17 | Stop reason: HOSPADM

## 2022-02-05 RX ADMIN — PIPERACILLIN AND TAZOBACTAM 3.38 G: 3; .375 INJECTION, POWDER, LYOPHILIZED, FOR SOLUTION INTRAVENOUS at 04:12

## 2022-02-05 RX ADMIN — SODIUM CHLORIDE 100 ML/HR: 9 INJECTION, SOLUTION INTRAVENOUS at 08:24

## 2022-02-05 RX ADMIN — MORPHINE SULFATE 2 MG: 2 INJECTION, SOLUTION INTRAMUSCULAR; INTRAVENOUS at 09:21

## 2022-02-05 RX ADMIN — MORPHINE SULFATE 2 MG: 2 INJECTION, SOLUTION INTRAMUSCULAR; INTRAVENOUS at 22:21

## 2022-02-05 RX ADMIN — PIPERACILLIN AND TAZOBACTAM 3.38 G: 3; .375 INJECTION, POWDER, LYOPHILIZED, FOR SOLUTION INTRAVENOUS at 12:35

## 2022-02-05 RX ADMIN — LORAZEPAM 0.5 MG: 2 INJECTION INTRAMUSCULAR; INTRAVENOUS at 12:41

## 2022-02-05 RX ADMIN — SODIUM CHLORIDE, PRESERVATIVE FREE 40 MG: 5 INJECTION INTRAVENOUS at 09:21

## 2022-02-05 RX ADMIN — SODIUM CHLORIDE, PRESERVATIVE FREE 40 MG: 5 INJECTION INTRAVENOUS at 20:18

## 2022-02-05 RX ADMIN — SODIUM CHLORIDE 100 ML/HR: 9 INJECTION, SOLUTION INTRAVENOUS at 20:13

## 2022-02-05 RX ADMIN — ENOXAPARIN SODIUM 40 MG: 100 INJECTION SUBCUTANEOUS at 09:21

## 2022-02-05 RX ADMIN — PIPERACILLIN AND TAZOBACTAM 3.38 G: 3; .375 INJECTION, POWDER, LYOPHILIZED, FOR SOLUTION INTRAVENOUS at 20:18

## 2022-02-05 RX ADMIN — FLUCONAZOLE 400 MG: 2 INJECTION, SOLUTION INTRAVENOUS at 20:13

## 2022-02-05 NOTE — PROGRESS NOTES
TRANSFER - IN REPORT:    Verbal report received from Wanda (name) on Jeremy Cross  being received from PACU (unit) for routine progression of care      Report consisted of patients Situation, Background, Assessment and   Recommendations(SBAR). Information from the following report(s) SBAR, ED Summary, Procedure Summary, Intake/Output, MAR, Accordion and Recent Results was reviewed with the receiving nurse. Opportunity for questions and clarification was provided. Assessment completed upon patients arrival to unit and care assumed. 80

## 2022-02-05 NOTE — PROGRESS NOTES
10:15: Per PCT upon vitals, patients HR elevated (130). 1030: Upon my assessment, patients . Patients states her HR increases when she is anxious. Doctor notified by Grazyna Valente. Per Dr Giancarlo Saleem, 0.5mg of IV Ativan Q12 PRN for anxiety was ordered. Will continue to monitor.

## 2022-02-05 NOTE — PROGRESS NOTES
Bedside and Verbal shift change report given to Tessie Benedict RN and Little Rudolph RN (oncoming nurse) by Umm Bishop RN (offgoing nurse). Report included the following information SBAR, ED Summary, Procedure Summary, Intake/Output, MAR and Recent Results.

## 2022-02-05 NOTE — PROGRESS NOTES
General Surgery Daily Progress Note    Patient: Bowen Mondragon MRN: [id number]  SSN: [id number]    YOB: 1955  Age: 75 y.o.   Sex: female      Admit Date: 2/4/2022    POD 1 Day Post-Op    Procedure: Procedure(s):  LAPAROSCOPY GENERAL DIAGNOSTIC, CONVERTED TO OPEN, LYSIS OF ADHESIONS, OVERSEWED PERFORATED ULCER    Subjective:   OOB to chair  NGT in place    Current Facility-Administered Medications   Medication Dose Route Frequency    LORazepam (ATIVAN) injection 0.5 mg  0.5 mg IntraVENous Q12H PRN    [START ON 2/6/2022] bisacodyL (DULCOLAX) suppository 10 mg  10 mg Rectal DAILY    0.9% sodium chloride infusion  100 mL/hr IntraVENous CONTINUOUS    pantoprazole (PROTONIX) 40 mg in 0.9% sodium chloride 10 mL injection  40 mg IntraVENous Q12H    fluconazole (DIFLUCAN) 400mg/200 mL IVPB (premix)  400 mg IntraVENous Q24H    piperacillin-tazobactam (ZOSYN) 3.375 g in 0.9% sodium chloride (MBP/ADV) 100 mL MBP  3.375 g IntraVENous Q8H    enoxaparin (LOVENOX) injection 40 mg  40 mg SubCUTAneous Q24H    lactated Ringers infusion  125 mL/hr IntraVENous CONTINUOUS    morphine injection 2 mg  2 mg IntraVENous Q3H PRN        Objective:   02/05 0701 - 02/05 1900  In: 0   Out: 250 [Urine:250]  02/03 1901 - 02/05 0700  In: 2950 [I.V.:2950]  Out: 395 [Urine:375; Drains:20]  Patient Vitals for the past 8 hrs:   BP Temp Pulse Resp SpO2   02/05/22 1334 131/62 98.1 °F (36.7 °C) (!) 121 14 98 %   02/05/22 1031 (!) 144/63 -- (!) 116 -- 96 %   02/05/22 1016 135/67 98.6 °F (37 °C) (!) 130 16 99 %       Physical Exam:  General: Alert, cooperative, NAD  Lungs: Unlabored  Abdomen: Soft, appropriate TTP, Dressing in place and C/D/I, HERIBERTO serousang  Extremities: Warm, moves all,   Skin:  Warm and dry, no rash    Labs:   Recent Labs     02/05/22  0056   WBC 6.1   HGB 7.2*   HCT 21.5*        Recent Labs     02/05/22  0056 02/04/22  1055 02/04/22  1055      < > 139   K 4.0   < > 3.9   *   < > 108   CO2 23 < > 24   *   < > 143*   BUN 22*   < > 30*   CREA 0.71   < > 0.88   CA 8.1*   < > 8.9   ALB  --   --  2.5*   TBILI  --   --  0.3   ALT  --   --  27    < > = values in this interval not displayed.        Assessment / Plan:     POD 1 Day Post-Op    Procedure: Procedure(s):  LAPAROSCOPY GENERAL DIAGNOSTIC, CONVERTED TO OPEN, LYSIS OF ADHESIONS, OVERSEWED PERFORATED ULCER    Continue abx with antifungal  Continue NGT, do not expect much out of it  Continue HERIBERTO  Continue BID PPI  On DVT prophylaxis  Needs PT for her new hip replacement  Will order daily suppository as cannot give anything from above at this time for her constipation      Jaycee Fuller MD

## 2022-02-05 NOTE — ANESTHESIA POSTPROCEDURE EVALUATION
Procedure(s):  LAPAROSCOPY GENERAL DIAGNOSTIC, CONVERTED TO OPEN, LYSIS OF ADHESIONS, OVERSEWED PERFORATED ULCER. general    Anesthesia Post Evaluation      Multimodal analgesia: multimodal analgesia not used between 6 hours prior to anesthesia start to PACU discharge  Patient location during evaluation: PACU  Patient participation: complete - patient participated  Level of consciousness: awake  Pain management: adequate  Airway patency: patent  Anesthetic complications: no  Cardiovascular status: acceptable, blood pressure returned to baseline and hemodynamically stable  Respiratory status: acceptable  Hydration status: acceptable  Post anesthesia nausea and vomiting:  controlled      INITIAL Post-op Vital signs:   Vitals Value Taken Time   /66 02/04/22 1900   Temp 37 °C (98.6 °F) 02/04/22 1827   Pulse 108 02/04/22 1910   Resp 25 02/04/22 1834   SpO2 100 % 02/04/22 1910   Vitals shown include unvalidated device data.

## 2022-02-05 NOTE — PROGRESS NOTES
Spiritual Care Assessment/Progress Note  [address]      NAME: Rodrigo Acosta      MRN: [id number]  AGE: 75 y.o.  SEX: female  Taoist Affiliation: Adventism   Language: English     2/5/2022     Total Time (in minutes): 10     Spiritual Assessment begun in [facility] 5M1 MED SURG 1 through conversation with:         [x]Patient        [x] Family    [] Friend(s)        Reason for Consult: Request by staff     Spiritual beliefs: (Please include comment if needed)     [x] Identifies with a fatoumata tradition:         [] Supported by a fatoumata community:            [] Claims no spiritual orientation:           [] Seeking spiritual identity:                [] Adheres to an individual form of spirituality:           [] Not able to assess:                           Identified resources for coping:      [] Prayer                               [] Music                  [] Guided Imagery     [x] Family/friends                 [] Pet visits     [] Devotional reading                         [] Unknown     [] Other:                                               Interventions offered during this visit: (See comments for more details)    Patient Interventions: Affirmation of emotions/emotional suffering,Affirmation of fatoumata,Iconic (affirming the presence of God/Higher Power),Prayer (assurance of)           Plan of Care:     [] Support spiritual and/or cultural needs    [] Support AMD and/or advance care planning process      [] Support grieving process   [] Coordinate Rites and/or Rituals    [] Coordination with community clergy   [] No spiritual needs identified at this time   [] Detailed Plan of Care below (See Comments)  [] Make referral to Music Therapy  [] Make referral to Pet Therapy     [] Make referral to Addiction services  [] Make referral to [facility]  [] Make referral to Spiritual Care Partner  [] No future visits requested        [x] Follow up visits as needed     Visited pt for initial spiritual assessment. Pt's  Yariel Whiteside was at bedside. Pt was in some discomfort and declines a  visit at this time. Pt's chart was consulted.    Chaplain Melendez MDiv, MS, [facility]

## 2022-02-05 NOTE — PROGRESS NOTES
Bedside shift change report given to [address] [place] [address] [address] (oncoming nurse) by Caitlin Madison RN (offgoing nurse). Report included the following information SBAR, Intake/Output, MAR and Recent Results.

## 2022-02-05 NOTE — PROGRESS NOTES
Problem: Mobility Impaired (Adult and Pediatric)  Goal: *Acute Goals and Plan of Care (Insert Text)  Description: FUNCTIONAL STATUS PRIOR TO ADMISSION: Patient was modified independent using a rolling walker for functional mobility. HOME SUPPORT PRIOR TO ADMISSION: The patient lived with  and required minimal assistance/contact guard assist for some ADLs. Physical Therapy Goals  Initiated 2/5/2022  1. Patient will move from supine to sit and sit to supine  in bed with minimal assistance/contact guard assist within 7 day(s). 2.  Patient will transfer from bed to chair and chair to bed with supervision/set-up using the least restrictive device within 7 day(s). 3.  Patient will perform sit to stand with modified independence within 7 day(s). 4.  Patient will ambulate with independence for 200 feet with the least restrictive device within 7 day(s). 5.  Patient will ascend/descend 13 stairs with handrail(s) with minimal assistance/contact guard assist within 7 day(s). PHYSICAL THERAPY EVALUATION  Patient: Carl Jj (31 y.o. female)  Date: 2/5/2022  Primary Diagnosis: Gastric ulcer [K25.9]  Procedure(s) (LRB):  LAPAROSCOPY GENERAL DIAGNOSTIC, CONVERTED TO OPEN, LYSIS OF ADHESIONS, OVERSEWED PERFORATED ULCER (N/A) 1 Day Post-Op   Precautions: Universal       ASSESSMENT  Based on the objective data described below, the patient presents with pain and decreased mobility. She had Right LAVERNE Anterior approach on 1/31/2022. Was on Fast Track and discharged home same day. Yesterday she had Lap converted to Open. Patient reports she only has pain in her abdomen (none in right hip), and would like to get up and walk. Reviewed and performed log roll to left with pillow between knees and mod x 1. In sitting performed LAQs on right for pre gait warmup and strengthening. Performed Sit t<> stand with cues for technique and contact guard.   Gait training in the regalado with the rolling walker - patient demonstrates good mariah and step thru technique. Current Level of Function Impacting Discharge (mobility/balance): mod assist with supine>sidelying>sit, contact guard sit to stand and gait    Functional Outcome Measure: The patient scored 65 out of 100 on the Barthel outcome measure which is indicative of functional impairment. Other factors to consider for discharge: lives with , bedroom on second floor, has a rolling walker, had anterior LAVERNE 1/31/2022     Patient will benefit from skilled therapy intervention to address the above noted impairments. PLAN :  Recommendations and Planned Interventions: bed mobility training, transfer training, gait training, and therapeutic exercises      Frequency/Duration: Patient will be followed by physical therapy:  5 times a week to address goals. Recommendation for discharge: (in order for the patient to meet his/her long term goals)  Outpatient physical therapy follow up recommended for right hip    This discharge recommendation:  Has not yet been discussed the attending provider and/or case management    IF patient discharges home will need the following DME: patient owns DME required for discharge         SUBJECTIVE:   Patient stated I was doing great with my hip.     OBJECTIVE DATA SUMMARY:   HISTORY:    Past Medical History:   Diagnosis Date    Anxiety     COVID-19 vaccine administered     boostered   Pfizer    White coat syndrome without diagnosis of hypertension      Past Surgical History:   Procedure Laterality Date    HX COLONOSCOPY  09/2021    HX DILATION AND CURETTAGE  1990    HX ORTHOPAEDIC         Personal factors and/or comorbidities impacting plan of care:          EXAMINATION/PRESENTATION/DECISION MAKING:   Critical Behavior:  Neurologic State: Alert  Orientation Level: Oriented X4  Cognition: Follows commands       Range Of Motion:  AROM: Within functional limits           PROM: Within functional limits Strength:    Strength: Generally decreased, functional                    Tone & Sensation:   Tone: Normal              Sensation: Intact               Coordination:  Coordination: Within functional limits  Vision:      Functional Mobility:  Bed Mobility:     Supine to Sit: Moderate assistance;Assist x1;Additional time; Adaptive equipment        Transfers:  Sit to Stand: Contact guard assistance;Assist x1;Additional time  Stand to Sit: Contact guard assistance; Additional time;Assist x1                       Balance:   Sitting: Intact  Standing: Intact; With support  Ambulation/Gait Training:  Distance (ft): 180 Feet (ft)  Assistive Device: Walker, rolling;Gait belt  Ambulation - Level of Assistance: Contact guard assistance;Assist x1;Additional time           Right Side Weight Bearing: As tolerated                                      Therapeutic Exercises:   Reviewed APs, GS, QS, Heel sets and performed sitting LAQs    Functional Measure:  Barthel Index:    Bathin  Bladder: 10  Bowels: 10  Groomin  Dressin  Feeding: 10  Mobility: 10  Stairs: 5  Toilet Use: 5  Transfer (Bed to Chair and Back): 5  Total: 65/100       The Barthel ADL Index: Guidelines  1. The index should be used as a record of what a patient does, not as a record of what a patient could do. 2. The main aim is to establish degree of independence from any help, physical or verbal, however minor and for whatever reason. 3. The need for supervision renders the patient not independent. 4. A patient's performance should be established using the best available evidence. Asking the patient, friends/relatives and nurses are the usual sources, but direct observation and common sense are also important. However direct testing is not needed. 5. Usually the patient's performance over the preceding 24-48 hours is important, but occasionally longer periods will be relevant.   6. Middle categories imply that the patient supplies over 50 per cent of the effort. 7. Use of aids to be independent is allowed. Score Interpretation (from [address])    Independent   60-79 Minimally independent   40-59 Partially dependent   20-39 Very dependent   <20 Totally dependent     -Emelyn Leblanc., Barthel, D.W. (1965). Functional evaluation: the Barthel Index. [facility] ([address] (1997). The Barthel activities of daily living index: self-reporting versus actual performance in the old (> or = 75 years). Journal of [address] [place] [address] 45(7), 14 Health system, J.SUNDEEPF, Merissa Miranda., Mauri Lamb. (1999). Measuring the change in disability after inpatient rehabilitation; comparison of the responsiveness of the Barthel Index and Functional Smith Measure. Journal of Neurology, Neurosurgery, and Psychiatry, 66(4), 555-435. Samantha Wood, N.J.A, SAMIR Rosen, & Ronak Bae M.A. (2004) Assessment of post-stroke quality of life in cost-effectiveness studies: The usefulness of the Barthel Index and the EuroQoL-5D. Quality of Life Research, 15, 923-07           Physical Therapy Evaluation Charge Determination   History Examination Presentation Decision-Making   LOW Complexity : Zero comorbidities / personal factors that will impact the outcome / POC LOW Complexity : 1-2 Standardized tests and measures addressing body structure, function, activity limitation and / or participation in recreation  LOW Complexity : Stable, uncomplicated  LOW Complexity : FOTO score of       Based on the above components, the patient evaluation is determined to be of the following complexity level: LOW     Pain Rating:  Received pain meds prior to PT    Activity Tolerance:   Good    After treatment patient left in no apparent distress:   Sitting in chair, Call bell within reach, and Caregiver / family present    COMMUNICATION/EDUCATION:   The patients plan of care was discussed with: Registered nurse.      Eveline prevention education was provided and the patient/caregiver indicated understanding., Patient/family have participated as able in goal setting and plan of care. , and Patient/family agree to work toward stated goals and plan of care.     Thank you for this referral.  Nico Kruger, PT   Time Calculation: 34 mins

## 2022-02-05 NOTE — ROUTINE PROCESS
TRANSFER - OUT REPORT:    Verbal report given to HELIO Anne(name) on Nani Gauthier Plant  being transferred to [facility](unit) for routine post - op       Report consisted of patients Situation, Background, Assessment and   Recommendations(SBAR). Information from the following report(s) SBAR and MAR was reviewed with the receiving nurse. Opportunity for questions and clarification was provided.       Patient transported with:   O2 @ 2 liters  Registered Nurse

## 2022-02-06 LAB
ANION GAP SERPL CALC-SCNC: 6 MMOL/L (ref 5–15)
BASOPHILS # BLD: 0 K/UL (ref 0–0.1)
BASOPHILS NFR BLD: 0 % (ref 0–1)
BUN SERPL-MCNC: 23 MG/DL (ref 6–20)
BUN/CREAT SERPL: 31 (ref 12–20)
CALCIUM SERPL-MCNC: 8.6 MG/DL (ref 8.5–10.1)
CHLORIDE SERPL-SCNC: 118 MMOL/L (ref 97–108)
CO2 SERPL-SCNC: 24 MMOL/L (ref 21–32)
COMMENT, HOLDF: NORMAL
CREAT SERPL-MCNC: 0.75 MG/DL (ref 0.55–1.02)
DIFFERENTIAL METHOD BLD: ABNORMAL
EOSINOPHIL # BLD: 0 K/UL (ref 0–0.4)
EOSINOPHIL NFR BLD: 0 % (ref 0–7)
ERYTHROCYTE [DISTWIDTH] IN BLOOD BY AUTOMATED COUNT: 13 % (ref 11.5–14.5)
GLUCOSE SERPL-MCNC: 88 MG/DL (ref 65–100)
HCT VFR BLD AUTO: 20.6 % (ref 35–47)
HGB BLD-MCNC: 6.7 G/DL (ref 11.5–16)
HISTORY CHECKED?,CKHIST: NORMAL
IMM GRANULOCYTES # BLD AUTO: 0 K/UL
IMM GRANULOCYTES NFR BLD AUTO: 0 %
LYMPHOCYTES # BLD: 1.9 K/UL (ref 0.8–3.5)
LYMPHOCYTES NFR BLD: 15 % (ref 12–49)
MCH RBC QN AUTO: 31 PG (ref 26–34)
MCHC RBC AUTO-ENTMCNC: 32.5 G/DL (ref 30–36.5)
MCV RBC AUTO: 95.4 FL (ref 80–99)
MONOCYTES # BLD: 0.4 K/UL (ref 0–1)
MONOCYTES NFR BLD: 3 % (ref 5–13)
NEUTS BAND NFR BLD MANUAL: 17 % (ref 0–6)
NEUTS SEG # BLD: 10.3 K/UL (ref 1.8–8)
NEUTS SEG NFR BLD: 65 % (ref 32–75)
NRBC # BLD: 0 K/UL (ref 0–0.01)
NRBC BLD-RTO: 0 PER 100 WBC
PLATELET # BLD AUTO: 285 K/UL (ref 150–400)
PMV BLD AUTO: 9.6 FL (ref 8.9–12.9)
POTASSIUM SERPL-SCNC: 3.6 MMOL/L (ref 3.5–5.1)
RBC # BLD AUTO: 2.16 M/UL (ref 3.8–5.2)
RBC MORPH BLD: ABNORMAL
SAMPLES BEING HELD,HOLD: NORMAL
SODIUM SERPL-SCNC: 148 MMOL/L (ref 136–145)
WBC # BLD AUTO: 12.6 K/UL (ref 3.6–11)

## 2022-02-06 PROCEDURE — 74011250636 HC RX REV CODE- 250/636: Performed by: STUDENT IN AN ORGANIZED HEALTH CARE EDUCATION/TRAINING PROGRAM

## 2022-02-06 PROCEDURE — 74011000250 HC RX REV CODE- 250: Performed by: SURGERY

## 2022-02-06 PROCEDURE — 74011000258 HC RX REV CODE- 258: Performed by: SURGERY

## 2022-02-06 PROCEDURE — 2709999900 HC NON-CHARGEABLE SUPPLY

## 2022-02-06 PROCEDURE — 77010033678 HC OXYGEN DAILY

## 2022-02-06 PROCEDURE — C9113 INJ PANTOPRAZOLE SODIUM, VIA: HCPCS | Performed by: SURGERY

## 2022-02-06 PROCEDURE — 94760 N-INVAS EAR/PLS OXIMETRY 1: CPT

## 2022-02-06 PROCEDURE — 74011250637 HC RX REV CODE- 250/637: Performed by: SURGERY

## 2022-02-06 PROCEDURE — 80048 BASIC METABOLIC PNL TOTAL CA: CPT

## 2022-02-06 PROCEDURE — 36415 COLL VENOUS BLD VENIPUNCTURE: CPT

## 2022-02-06 PROCEDURE — 65270000029 HC RM PRIVATE

## 2022-02-06 PROCEDURE — 94761 N-INVAS EAR/PLS OXIMETRY MLT: CPT

## 2022-02-06 PROCEDURE — 74011250636 HC RX REV CODE- 250/636: Performed by: SURGERY

## 2022-02-06 PROCEDURE — P9016 RBC LEUKOCYTES REDUCED: HCPCS

## 2022-02-06 PROCEDURE — 85025 COMPLETE CBC W/AUTO DIFF WBC: CPT

## 2022-02-06 PROCEDURE — 51798 US URINE CAPACITY MEASURE: CPT

## 2022-02-06 PROCEDURE — 36430 TRANSFUSION BLD/BLD COMPNT: CPT

## 2022-02-06 RX ORDER — ACETAMINOPHEN 650 MG/1
650 SUPPOSITORY RECTAL
Status: DISCONTINUED | OUTPATIENT
Start: 2022-02-06 | End: 2022-02-17 | Stop reason: HOSPADM

## 2022-02-06 RX ORDER — SODIUM CHLORIDE 9 MG/ML
250 INJECTION, SOLUTION INTRAVENOUS AS NEEDED
Status: DISCONTINUED | OUTPATIENT
Start: 2022-02-06 | End: 2022-02-17 | Stop reason: HOSPADM

## 2022-02-06 RX ADMIN — PIPERACILLIN AND TAZOBACTAM 3.38 G: 3; .375 INJECTION, POWDER, LYOPHILIZED, FOR SOLUTION INTRAVENOUS at 21:15

## 2022-02-06 RX ADMIN — SODIUM CHLORIDE, PRESERVATIVE FREE 40 MG: 5 INJECTION INTRAVENOUS at 21:15

## 2022-02-06 RX ADMIN — PIPERACILLIN AND TAZOBACTAM 3.38 G: 3; .375 INJECTION, POWDER, LYOPHILIZED, FOR SOLUTION INTRAVENOUS at 15:54

## 2022-02-06 RX ADMIN — MORPHINE SULFATE 2 MG: 2 INJECTION, SOLUTION INTRAMUSCULAR; INTRAVENOUS at 04:41

## 2022-02-06 RX ADMIN — ENOXAPARIN SODIUM 40 MG: 100 INJECTION SUBCUTANEOUS at 09:07

## 2022-02-06 RX ADMIN — ACETAMINOPHEN 650 MG: 650 SUPPOSITORY RECTAL at 01:17

## 2022-02-06 RX ADMIN — BISACODYL 10 MG: 10 SUPPOSITORY RECTAL at 15:56

## 2022-02-06 RX ADMIN — SODIUM CHLORIDE 100 ML/HR: 9 INJECTION, SOLUTION INTRAVENOUS at 04:40

## 2022-02-06 RX ADMIN — PIPERACILLIN AND TAZOBACTAM 3.38 G: 3; .375 INJECTION, POWDER, LYOPHILIZED, FOR SOLUTION INTRAVENOUS at 04:40

## 2022-02-06 RX ADMIN — SODIUM CHLORIDE, PRESERVATIVE FREE 40 MG: 5 INJECTION INTRAVENOUS at 09:07

## 2022-02-06 RX ADMIN — SODIUM CHLORIDE 100 ML/HR: 9 INJECTION, SOLUTION INTRAVENOUS at 21:20

## 2022-02-06 RX ADMIN — FLUCONAZOLE 400 MG: 2 INJECTION, SOLUTION INTRAVENOUS at 21:15

## 2022-02-06 RX ADMIN — LORAZEPAM 0.5 MG: 2 INJECTION INTRAMUSCULAR; INTRAVENOUS at 01:17

## 2022-02-06 NOTE — PROGRESS NOTES
General Surgery Daily Progress Note    Patient: Belén Johnston MRN: [id number]  SSN: [id number]    YOB: 1955  Age: 75 y.o.   Sex: female      Admit Date: 2/4/2022    POD 2 Day Post-Op    Procedure: Procedure(s):  LAPAROSCOPY GENERAL DIAGNOSTIC, CONVERTED TO OPEN, LYSIS OF ADHESIONS, OVERSEWED PERFORATED ULCER    Subjective:   NGT in place  Voided  Pain controlled  Denies N/V    Current Facility-Administered Medications   Medication Dose Route Frequency    acetaminophen (TYLENOL) suppository 650 mg  650 mg Rectal Q6H PRN    0.9% sodium chloride infusion 250 mL  250 mL IntraVENous PRN    LORazepam (ATIVAN) injection 0.5 mg  0.5 mg IntraVENous Q12H PRN    bisacodyL (DULCOLAX) suppository 10 mg  10 mg Rectal DAILY    0.9% sodium chloride infusion  100 mL/hr IntraVENous CONTINUOUS    pantoprazole (PROTONIX) 40 mg in 0.9% sodium chloride 10 mL injection  40 mg IntraVENous Q12H    fluconazole (DIFLUCAN) 400mg/200 mL IVPB (premix)  400 mg IntraVENous Q24H    piperacillin-tazobactam (ZOSYN) 3.375 g in 0.9% sodium chloride (MBP/ADV) 100 mL MBP  3.375 g IntraVENous Q8H    enoxaparin (LOVENOX) injection 40 mg  40 mg SubCUTAneous Q24H    lactated Ringers infusion  125 mL/hr IntraVENous CONTINUOUS    morphine injection 2 mg  2 mg IntraVENous Q3H PRN        Objective:   02/06 0701 - 02/06 1900  In: 0   Out: 190 [Drains:40]  02/04 1901 - 02/06 0700  In: 4016.7 [I.V.:4016.7]  Out: 7443 [Urine:1465; Drains:240]  Patient Vitals for the past 8 hrs:   BP Temp Pulse Resp SpO2   02/06/22 0737 132/75 98.2 °F (36.8 °C) (!) 119 16 98 %   02/06/22 0354 (!) 142/70 98.7 °F (37.1 °C) (!) 128 18 96 %       Physical Exam:  General: Alert, cooperative, NAD  Lungs: Unlabored  Abdomen: Soft, appropriate TTP, Dressing in place and C/D/I, HERIBERTO serousang  Extremities: Warm, moves all,   Skin:  Warm and dry, no rash    Labs:   Recent Labs     02/06/22  0927   WBC 12.6*   HGB 6.7*   HCT 20.6*        Recent Labs 02/06/22  0927 02/05/22  0056 02/04/22  1055   *   < > 139   K 3.6   < > 3.9   *   < > 108   CO2 24   < > 24   GLU 88   < > 143*   BUN 23*   < > 30*   CREA 0.75   < > 0.88   CA 8.6   < > 8.9   ALB  --   --  2.5*   TBILI  --   --  0.3   ALT  --   --  27    < > = values in this interval not displayed. Assessment / Plan:     POD 1 Day Post-Op    Procedure: Procedure(s):  LAPAROSCOPY GENERAL DIAGNOSTIC, CONVERTED TO OPEN, LYSIS OF ADHESIONS, OVERSEWED PERFORATED ULCER    Continue abx with antifungal  Continue NGT, do not expect much out of it  Continue HERIBERTO  Continue BID PPI  On DVT prophylaxis  Needs PT for her new hip replacement   daily suppository as cannot give anything from above at this time for her constipation  Still with tachy and now Hgb 6.7.   Ordered 2u PRBC      Juan Silva MD

## 2022-02-06 NOTE — PROGRESS NOTES
Problem: Pressure Injury - Risk of  Goal: *Prevention of pressure injury  Description: Document Chase Scale and appropriate interventions in the flowsheet. Outcome: Progressing Towards Goal  Note: Pressure Injury Interventions: Activity Interventions: Increase time out of bed,Pressure redistribution bed/mattress(bed type),PT/OT evaluation    Mobility Interventions: Assess need for specialty bed,Pressure redistribution bed/mattress (bed type),PT/OT evaluation,Turn and reposition approx. every two hours(pillow and wedges)    Nutrition Interventions: Document food/fluid/supplement intake,Offer support with meals,snacks and hydration                     Problem: Falls - Risk of  Goal: *Absence of Falls  Description: Document Karl Fall Risk and appropriate interventions in the flowsheet.   Outcome: Progressing Towards Goal  Note: Fall Risk Interventions:  Mobility Interventions: Bed/chair exit alarm,Communicate number of staff needed for ambulation/transfer,Strengthening exercises (ROM-active/passive),Utilize walker, cane, or other assistive device,Utilize gait belt for transfers/ambulation         Medication Interventions: Bed/chair exit alarm,Patient to call before getting OOB,Utilize gait belt for transfers/ambulation    Elimination Interventions: Bed/chair exit alarm,Call light in reach,Toileting schedule/hourly rounds              Problem: Pain  Goal: *Control of Pain  Outcome: Progressing Towards Goal     Problem: Constipation - Risk of  Goal: *Prevention of constipation  Outcome: Progressing Towards Goal

## 2022-02-06 NOTE — OP NOTES
[facility] [place] [address]  OPERATIVE REPORT    Name:  Eugenie Berg  MR#:  [id number]  :  1955  ACCOUNT #:  [de-identified]  DATE OF SERVICE:  2022    PREOPERATIVE DIAGNOSIS:  Gastric perforation. POSTOPERATIVE DIAGNOSIS:  Prepyloric gastric perforation. PROCEDURES PERFORMED:  Diagnostic laparoscopy converted to open lysis of adhesions, oversewed perforated ulcer and Ina Hammock. SURGEON:  Doc Valdovinos MD    ASSISTANT:  None. ANESTHESIA:  General.    COMPLICATIONS:  None. SPECIMENS REMOVED:  None. IMPLANTS:  None. ESTIMATED BLOOD LOSS:  Minimal.    DRAINS:  Low drain over the area and NG tube. FINDINGS:  Prepyloric perforated gastric ulcer, approximated the edges and also did an omental patch. This did not appear as a fresh perforation and appeared like the left lobe of the liver was covering the perforation probably for a day or two. Abdominal washout was performed. PROCEDURE:  The patient was taken to the operating room, laid supine on the operating room table. After general endotracheal anesthesia was administered, the patient's abdomen was prepped and draped in a standard surgical fashion. A supraumbilical incision was made. Fascia was grasped, elevated with a Nitza Peels. Veress needle was passed through without difficulty. Initial abdominal pressures were low and a pneumoperitoneum of 15 mmHg was reached. A 5-mm port was placed. The camera was passed through. The immediate area was inspected for any inadvertent injury and none were seen. We immediately saw on the upper abdomen that there was a lot of gastric fluid around the area. We placed another 5-mm port under direct visualization in the right upper quadrant and took down some adhesions bluntly. We lifted up the left lobe of the liver, which was adhesed actually to the anterior portion of the stomach and the prepyloric and pyloric area.   It appeared that this is the area that was covering up the perforation; however, it did not appear fresh as there was already second rind around the area. Due to the angle of the camera, I could not find the perforation per se and due to the texture of tissue that was in the stomach, it appeared as if there was like a hardened mass in the area and I felt like I needed to palpate this in case there was something that I needed to biopsy. So at this point, I opted to open and created an upper midline incision, carried down through the subcutaneous tissues with cautery and got into the abdomen. Immediately, I lifted up the left lobe of the liver and I palpated the tissue and it was not hard. It was not a mass that I needed to biopsy, but I did find the perforation which was a decent size perforation, approximately 2 cm x 2 cm in the prepyloric area. Using 3-0 silk stitches, I approximated the area as much as possible as I know that tissue was friable, but because it was a bigger perforation, I wanted to approximate it some which I did successfully with several interrupted 0 silk stitches and then I proceeded to perform a Cleotha Reason patch as well. The abdomen was washed out with 2 liters of normal saline and suctioned out. A Low drain was placed along the area and this was stitched in place with a drain stitch and was using the right upper quadrant port site that we had created. The midline was closed with 0 Prolene and skin with staples and 4-0 Vicryl was used to close the supraumbilical initial port site. Dressing was placed. The drain was placed to bulb suction and the patient was taken to Recovery with her NG tube in stable condition.       MD ALFREDO [email or web address]  D:  02/05/2022 15:26  T:  02/05/2022 23:18  JOB #:  [id number]

## 2022-02-06 NOTE — PROGRESS NOTES
1010 - Called general surgery office to notify on call MD of hemoglobin of 6.7, awaiting call back. 1050 - Orders received for 2 units of PRBC.     1317 - Per blood bank, can only transfuse one unit and then have to recheck H&H. If Hgb is above 7, a second unit cannot be given unless patient is unstable or in critical condition due to national shortage of blood. Called Dr. Jaren Doss, awaiting call back. 1326 - Received call from Dr. Jaren Doss, received orders to transfuse 1 unit of PRBCs and recheck H&H with tomorrow morning labs. 1930 - Bedside shift change report given to Eldon Pabon  (oncoming nurse) by Jose Kline (offgoing nurse). Report included the following information SBAR, Kardex, ED Summary, OR Summary, Procedure Summary, Intake/Output, MAR, Recent Results and Med Rec Status.

## 2022-02-06 NOTE — PROGRESS NOTES
2230: Bladder scan 306. Patient has not voided yet since avila was taken out    0100: Patient's temp 101.1 and HR still in the 120's. RN notified Patience Penbricet and ordered tylenol suppository Q6HRS PRN. No further orders given at this time. Will contnue to monitor. 0130: Temp 98.3 orally. Will continue to monitor. 0500: Patient able to void 540ml in the bathroom. Post void bladder scan is 38. Will continue to monitor. Bedside and Verbal shift change report given to HELIO Velez (oncoming nurse) by Eva GALO RN (offgoing nurse). Report included the following information SBAR, Kardex, Intake/Output, MAR and Recent Results.

## 2022-02-07 ENCOUNTER — APPOINTMENT (OUTPATIENT)
Dept: CT IMAGING | Age: 67
DRG: 327 | End: 2022-02-07
Attending: NURSE PRACTITIONER
Payer: COMMERCIAL

## 2022-02-07 LAB
ABO + RH BLD: NORMAL
ANION GAP SERPL CALC-SCNC: 9 MMOL/L (ref 5–15)
BASOPHILS # BLD: 0 K/UL (ref 0–0.1)
BASOPHILS NFR BLD: 0 % (ref 0–1)
BLD PROD TYP BPU: NORMAL
BLOOD GROUP ANTIBODIES SERPL: NORMAL
BPU ID: NORMAL
BUN SERPL-MCNC: 28 MG/DL (ref 6–20)
BUN/CREAT SERPL: 42 (ref 12–20)
CALCIUM SERPL-MCNC: 8.4 MG/DL (ref 8.5–10.1)
CHLORIDE SERPL-SCNC: 119 MMOL/L (ref 97–108)
CO2 SERPL-SCNC: 22 MMOL/L (ref 21–32)
CREAT SERPL-MCNC: 0.67 MG/DL (ref 0.55–1.02)
CROSSMATCH RESULT,%XM: NORMAL
DIFFERENTIAL METHOD BLD: ABNORMAL
EOSINOPHIL # BLD: 0 K/UL (ref 0–0.4)
EOSINOPHIL NFR BLD: 0 % (ref 0–7)
ERYTHROCYTE [DISTWIDTH] IN BLOOD BY AUTOMATED COUNT: 13.3 % (ref 11.5–14.5)
GLUCOSE SERPL-MCNC: 78 MG/DL (ref 65–100)
HCT VFR BLD AUTO: 22.7 % (ref 35–47)
HGB BLD-MCNC: 7.5 G/DL (ref 11.5–16)
IMM GRANULOCYTES # BLD AUTO: 0.3 K/UL (ref 0–0.04)
IMM GRANULOCYTES NFR BLD AUTO: 2 % (ref 0–0.5)
LYMPHOCYTES # BLD: 1.4 K/UL (ref 0.8–3.5)
LYMPHOCYTES NFR BLD: 10 % (ref 12–49)
MCH RBC QN AUTO: 31.3 PG (ref 26–34)
MCHC RBC AUTO-ENTMCNC: 33 G/DL (ref 30–36.5)
MCV RBC AUTO: 94.6 FL (ref 80–99)
MONOCYTES # BLD: 0.5 K/UL (ref 0–1)
MONOCYTES NFR BLD: 4 % (ref 5–13)
NEUTS SEG # BLD: 12 K/UL (ref 1.8–8)
NEUTS SEG NFR BLD: 84 % (ref 32–75)
NRBC # BLD: 0 K/UL (ref 0–0.01)
NRBC BLD-RTO: 0 PER 100 WBC
PLATELET # BLD AUTO: 335 K/UL (ref 150–400)
PMV BLD AUTO: 9.8 FL (ref 8.9–12.9)
POTASSIUM SERPL-SCNC: 3.2 MMOL/L (ref 3.5–5.1)
RBC # BLD AUTO: 2.4 M/UL (ref 3.8–5.2)
SODIUM SERPL-SCNC: 150 MMOL/L (ref 136–145)
SPECIMEN EXP DATE BLD: NORMAL
STATUS OF UNIT,%ST: NORMAL
UNIT DIVISION, %UDIV: 0
WBC # BLD AUTO: 14.2 K/UL (ref 3.6–11)

## 2022-02-07 PROCEDURE — 36415 COLL VENOUS BLD VENIPUNCTURE: CPT

## 2022-02-07 PROCEDURE — 74011250637 HC RX REV CODE- 250/637: Performed by: SURGERY

## 2022-02-07 PROCEDURE — 74011000258 HC RX REV CODE- 258: Performed by: SURGERY

## 2022-02-07 PROCEDURE — 74011250636 HC RX REV CODE- 250/636: Performed by: NURSE PRACTITIONER

## 2022-02-07 PROCEDURE — 74011000636 HC RX REV CODE- 636: Performed by: RADIOLOGY

## 2022-02-07 PROCEDURE — C9113 INJ PANTOPRAZOLE SODIUM, VIA: HCPCS | Performed by: SURGERY

## 2022-02-07 PROCEDURE — 74011000250 HC RX REV CODE- 250: Performed by: SURGERY

## 2022-02-07 PROCEDURE — 74177 CT ABD & PELVIS W/CONTRAST: CPT

## 2022-02-07 PROCEDURE — 71275 CT ANGIOGRAPHY CHEST: CPT

## 2022-02-07 PROCEDURE — 65270000029 HC RM PRIVATE

## 2022-02-07 PROCEDURE — 97116 GAIT TRAINING THERAPY: CPT

## 2022-02-07 PROCEDURE — 85025 COMPLETE CBC W/AUTO DIFF WBC: CPT

## 2022-02-07 PROCEDURE — 80048 BASIC METABOLIC PNL TOTAL CA: CPT

## 2022-02-07 PROCEDURE — 99223 1ST HOSP IP/OBS HIGH 75: CPT | Performed by: INTERNAL MEDICINE

## 2022-02-07 PROCEDURE — 74011250636 HC RX REV CODE- 250/636: Performed by: SURGERY

## 2022-02-07 PROCEDURE — 97530 THERAPEUTIC ACTIVITIES: CPT

## 2022-02-07 RX ORDER — POTASSIUM CHLORIDE 7.45 MG/ML
10 INJECTION INTRAVENOUS
Status: COMPLETED | OUTPATIENT
Start: 2022-02-07 | End: 2022-02-07

## 2022-02-07 RX ORDER — DEXTROSE, SODIUM CHLORIDE, AND POTASSIUM CHLORIDE 5; .45; .15 G/100ML; G/100ML; G/100ML
100 INJECTION INTRAVENOUS CONTINUOUS
Status: DISCONTINUED | OUTPATIENT
Start: 2022-02-07 | End: 2022-02-12

## 2022-02-07 RX ADMIN — POTASSIUM CHLORIDE, DEXTROSE MONOHYDRATE AND SODIUM CHLORIDE 100 ML/HR: 150; 5; 450 INJECTION, SOLUTION INTRAVENOUS at 11:01

## 2022-02-07 RX ADMIN — SODIUM CHLORIDE, PRESERVATIVE FREE 40 MG: 5 INJECTION INTRAVENOUS at 20:06

## 2022-02-07 RX ADMIN — IOPAMIDOL 100 ML: 755 INJECTION, SOLUTION INTRAVENOUS at 11:15

## 2022-02-07 RX ADMIN — BISACODYL 10 MG: 10 SUPPOSITORY RECTAL at 08:30

## 2022-02-07 RX ADMIN — ENOXAPARIN SODIUM 40 MG: 100 INJECTION SUBCUTANEOUS at 08:27

## 2022-02-07 RX ADMIN — SODIUM CHLORIDE, PRESERVATIVE FREE 40 MG: 5 INJECTION INTRAVENOUS at 08:26

## 2022-02-07 RX ADMIN — POTASSIUM CHLORIDE 10 MEQ: 10 INJECTION, SOLUTION INTRAVENOUS at 08:39

## 2022-02-07 RX ADMIN — PIPERACILLIN AND TAZOBACTAM 3.38 G: 3; .375 INJECTION, POWDER, LYOPHILIZED, FOR SOLUTION INTRAVENOUS at 20:06

## 2022-02-07 RX ADMIN — PIPERACILLIN AND TAZOBACTAM 3.38 G: 3; .375 INJECTION, POWDER, LYOPHILIZED, FOR SOLUTION INTRAVENOUS at 13:50

## 2022-02-07 RX ADMIN — POTASSIUM CHLORIDE 10 MEQ: 10 INJECTION, SOLUTION INTRAVENOUS at 11:36

## 2022-02-07 RX ADMIN — IOHEXOL 50 ML: 240 INJECTION, SOLUTION INTRATHECAL; INTRAVASCULAR; INTRAVENOUS; ORAL at 09:57

## 2022-02-07 RX ADMIN — POTASSIUM CHLORIDE 10 MEQ: 10 INJECTION, SOLUTION INTRAVENOUS at 12:47

## 2022-02-07 RX ADMIN — PIPERACILLIN AND TAZOBACTAM 3.38 G: 3; .375 INJECTION, POWDER, LYOPHILIZED, FOR SOLUTION INTRAVENOUS at 05:47

## 2022-02-07 RX ADMIN — FLUCONAZOLE 400 MG: 2 INJECTION, SOLUTION INTRAVENOUS at 20:06

## 2022-02-07 NOTE — PROGRESS NOTES
Met with patient briefly this am and chart review. Patient confirms she lives in a two level home with her , Zeynep Abdi. Message left for him to call CM and he returned call at this writing. They use the upstairs bedrooms and she has a rolling walker that she uses. Had right hip replacement 1/31 she had an appt this past Friday to start outpatient therapy but was admitted to acute care before she could start her therapy. Reason for Admission:  Gastric perforation. RUR Score:   10%                  Plan for utilizing home health:       PCP: First and Last name:  Saad Warren MD     Name of Practice:    Are you a current patient: Yes/No:yes    Approximate date of last visit:    Can you participate in a virtual visit with your PCP: yes                    Current Advanced Directive/Advance Care Plan:   Full Code    Healthcare Decision Maker:   Has Advanced Directive, not on file and family to bring in. Poppy Brasher [phone]                               Transition of Care Plan:        1- CM to continue to follow  2- Identify if patient will have [facility] or outpatient at discharge and arrange. 3-  plans on being transport at discharge. Care Management Interventions  PCP Verified by CM:  Yes (Dr Gena Anderson)  Transition of Care Consult (CM Consult):  (new assessment protocol)  Physical Therapy Consult: Yes  Occupational Therapy Consult: Yes  Support Systems: Spouse/Significant Other  Confirm Follow Up Transport: Family  Discharge Location  Patient Expects to be Discharged to[de-identified] Unable to determine at this time

## 2022-02-07 NOTE — PROGRESS NOTES
Problem: Mobility Impaired (Adult and Pediatric)  Goal: *Acute Goals and Plan of Care (Insert Text)  Description: FUNCTIONAL STATUS PRIOR TO ADMISSION: Patient was modified independent using a rolling walker for functional mobility. HOME SUPPORT PRIOR TO ADMISSION: The patient lived with  and required minimal assistance/contact guard assist for some ADLs. bedroom on second floor, has a rolling walker, had anterior LAVERNE 1/31/2022    Physical Therapy Goals  Initiated 2/5/2022  1. Patient will move from supine to sit and sit to supine  in bed with minimal assistance/contact guard assist within 7 day(s). 2.  Patient will transfer from bed to chair and chair to bed with supervision/set-up using the least restrictive device within 7 day(s). 3.  Patient will perform sit to stand with modified independence within 7 day(s). 4.  Patient will ambulate with independence for 200 feet with the least restrictive device within 7 day(s). 5.  Patient will ascend/descend 13 stairs with handrail(s) with minimal assistance/contact guard assist within 7 day(s). Outcome: Progressing Towards Goal   PHYSICAL THERAPY TREATMENT  Patient: Keyshawn Daley (16 y.o. female)  Date: 2/7/2022  Diagnosis: Gastric ulcer [K25.9] <principal problem not specified>  Procedure(s) (LRB):  LAPAROSCOPY GENERAL DIAGNOSTIC, CONVERTED TO OPEN, LYSIS OF ADHESIONS, OVERSEWED PERFORATED ULCER (N/A) 3 Days Post-Op  Precautions:    Chart, physical therapy assessment, plan of care and goals were reviewed. ASSESSMENT  Patient continues with skilled PT services and is progressing towards goals. Patient will benefit from OP PT upon discharge; she has all DME. Current Level of Function Impacting Discharge (mobility/balance): Patient received up in chair after having had suppository earlier and asking to urgently get to restroom. PT assisted her there and she had small bowel movement.   Returned to edge of bed to prepare for further ambulation when MD came in and told patient she would order a CT scan to rule out PE since patient has been tachy. PT assisted patient to restroom again at her request and she had another small bowel movement. Returned to chair with CGA using rolling walker for all gait activity. Vitals after ambulation: 175/76, , O2 sats 100% on room air. HR then recovered to 105. Nursing aware. Further ambulation deferred at this time until results of CT are known. Hgb 7.5 today after transfusion. Other factors to consider for discharge: recent right LAVERNE, anterior approach         PLAN :  Patient continues to benefit from skilled intervention to address the above impairments. Continue treatment per established plan of care. to address goals. Recommendation for discharge: (in order for the patient to meet his/her long term goals)  Outpatient physical therapy follow up recommended for LAVERNE    This discharge recommendation:  Has not yet been discussed the attending provider and/or case management    IF patient discharges home will need the following DME: patient owns DME required for discharge       SUBJECTIVE:   Patient stated I was doing so well with my hip; it's frustrating.     OBJECTIVE DATA SUMMARY:   Critical Behavior:  Neurologic State: Alert,Appropriate for age  Orientation Level: Oriented X4  Cognition: Follows commands     Functional Mobility Training:  Bed Mobility:      Received up in chair and left up in chair              Transfers:  Sit to Stand: Contact guard assistance  Stand to Sit: Contact guard assistance                             Balance:  Sitting: Intact  Standing: Intact; With support  Ambulation/Gait Training:  Distance (ft): 40 Feet (ft) (x2)  Assistive Device: Gait belt;Walker, rolling  Ambulation - Level of Assistance: Contact guard assistance           Right Side Weight Bearing: As tolerated                                            Pain Rating:  No complaints    Activity Tolerance: Good    After treatment patient left in no apparent distress:   Sitting in chair, Call bell within reach, and Caregiver / family present    COMMUNICATION/COLLABORATION:   The patients plan of care was discussed with: Registered nurse and Physician.      Low Solorzano PT   Time Calculation: 30 mins

## 2022-02-07 NOTE — CONSULTS
Infectious Disease Consult    Impression/Plan   · Leukocytosis. Likely in part reactive due to surgery, constipation, and extreme discomfort from NG tube. CT scan concerning for left basilar pneumonia as well as possible developing abdominal abscess. .  Patient asymptomatic from pulmonary standpoint and is on appropriate antibiotics for possible aspiration. Unfortunately abdominal collection is not amenable to percutaneous drainage. Agree with piperacillin-tazobactam and fluconazole. Follow WBC trend. No antibiotic changes at this time. We will make further recommendations based on WBC trend tomorrow  · Perforated gastric ulcer status post open repair with Vannie Cassis patch 2/5/2022  · Recent right TKA 1/31/2022        Anti-infectives:   1. Piperacillin-tazobactam 2/4-  2. Fluconazole 2/4-    Subjective:   Date of Consultation:  February 7, 2022  Date of Admission: 2/4/2022   Referring Physician:     Patient is a 75 y.o. female with a past medical history significant for whitecoat syndrome and anxiety who is being seen for leukocytosis. Patient was recently admitted to [facility] where she underwent right hip replacement on 1/31/2022. Since discharge she has been experiencing abdominal pain and constipation. .  She returned to the emergency department due to worsening abdominal pain. A CT scan was done which revealed free air from a gastric perforation. Shet was admitted to the general surgery service and was taken to the OR on 2/5 where she underwent open repair of perforated ulcer with Vannie Cassis patch. Postoperative course has been complicated by progressive leukocytosis. She is currently on piperacillin-tazobactam and fluconazole.   The infectious diseases service has been asked to assist with antibiotic management    Patient Active Problem List   Diagnosis Code    Primary osteoarthritis of right hip M16.11    Gastric ulcer K25.9     Past Medical History:   Diagnosis Date    Anxiety     COVID-19 vaccine administered     boostered   Pitney Annemarie coat syndrome without diagnosis of hypertension       Family History   Problem Relation Age of Onset    Clotting Disorder Neg Hx       Social History     Tobacco Use    Smoking status: Never Smoker    Smokeless tobacco: Never Used   Substance Use Topics    Alcohol use: Yes     Comment: social     Past Surgical History:   Procedure Laterality Date    HX COLONOSCOPY  09/2021    HX DILATION AND CURETTAGE  1990    HX ORTHOPAEDIC        Prior to Admission medications    Medication Sig Start Date End Date Taking? Authorizing Provider   aspirin delayed-release 81 mg tablet Take 1 Tablet by mouth two (2) times a day. 1/31/22  Yes Wali Esposito MD   ibuprofen (MOTRIN) 800 mg tablet Take 1 Tablet by mouth every six (6) hours as needed for Pain. 1/31/22  Yes Wali Esposito MD   oxyCODONE IR (ROXICODONE) 5 mg immediate release tablet Take 1 Tablet by mouth every four (4) hours as needed for Pain for up to 7 days. Max Daily Amount: 30 mg. Indications: pain 1/31/22 2/7/22 Yes Wali Esposito MD   acetaminophen (Acetaminophen Pain Relief) 500 mg tablet Take 2 Tablets by mouth every six (6) hours as needed for Pain. 1/31/22  Yes Wali Esposito MD   ondansetron (ZOFRAN ODT) 8 mg disintegrating tablet Take 0.5 Tablets by mouth every eight (8) hours as needed for Nausea. 1/31/22  Yes Wali Esposito MD   efinaconazole [facility]) rosendo topical solution Apply  to affected area daily. Yes Provider, Historical   multivitamin (ONE A DAY) tablet Take 1 Tablet by mouth daily. Yes Provider, Historical   traMADoL (ULTRAM) 50 mg tablet Take 1 Tablet by mouth every six (6) hours as needed for Pain for up to 7 days. Max Daily Amount: 200 mg. Patient not taking: Reported on 2/4/2022 1/31/22 2/7/22  Wali Esposito MD     No Known Allergies     Review of Systems:  A comprehensive review of systems was negative except for that written in the History of Present Illness.     Objective:   Blood pressure (!) 164/69, pulse (!) 104, temperature 97.8 °F (36.6 °C), resp. rate 17, height 5' 6\" (1.676 m), weight 190 lb 0.6 oz (86.2 kg), SpO2 97 %. Temp (24hrs), Av.3 °F (36.8 °C), Min:97.8 °F (36.6 °C), Max:98.6 °F (37 °C)       Exam:    General:  Alert, cooperative, well noursished, well developed, appears stated age   Eyes:  Sclera anicteric. Mouth/Throat: Mucous membranes normal   Neck: Supple   Lungs:   Clear to auscultation bilaterally   CV:   Mild tachycardia   Abdomen:   Soft, non-tender to light palpation. Incision with staples intact. No erythema or drainage. LLQ HERIBERTO with serosanguineous drainage   Extremities: No edema   Skin:  No rash   Lymph nodes:    Musculoskeletal:    Lines/Devices:  Intact, no erythema, drainage or tenderness   Psych: Alert and oriented, normal mood affect given the setting       Data Review:   Recent Results (from the past 24 hour(s))   CBC WITH AUTOMATED DIFF    Collection Time: 22  6:07 AM   Result Value Ref Range    WBC 14.2 (H) 3.6 - 11.0 K/uL    RBC 2.40 (L) 3.80 - 5.20 M/uL    HGB 7.5 (L) 11.5 - 16.0 g/dL    HCT 22.7 (L) 35.0 - 47.0 %    MCV 94.6 80.0 - 99.0 FL    MCH 31.3 26.0 - 34.0 PG    MCHC 33.0 30.0 - 36.5 g/dL    RDW 13.3 11.5 - 14.5 %    PLATELET 502 420 - 128 K/uL    MPV 9.8 8.9 - 12.9 FL    NRBC 0.0 0  WBC    ABSOLUTE NRBC 0.00 0.00 - 0.01 K/uL    NEUTROPHILS 84 (H) 32 - 75 %    LYMPHOCYTES 10 (L) 12 - 49 %    MONOCYTES 4 (L) 5 - 13 %    EOSINOPHILS 0 0 - 7 %    BASOPHILS 0 0 - 1 %    IMMATURE GRANULOCYTES 2 (H) 0.0 - 0.5 %    ABS. NEUTROPHILS 12.0 (H) 1.8 - 8.0 K/UL    ABS. LYMPHOCYTES 1.4 0.8 - 3.5 K/UL    ABS. MONOCYTES 0.5 0.0 - 1.0 K/UL    ABS. EOSINOPHILS 0.0 0.0 - 0.4 K/UL    ABS. BASOPHILS 0.0 0.0 - 0.1 K/UL    ABS. IMM.  GRANS. 0.3 (H) 0.00 - 0.04 K/UL    DF AUTOMATED     METABOLIC PANEL, BASIC    Collection Time: 22  6:07 AM   Result Value Ref Range    Sodium 150 (H) 136 - 145 mmol/L    Potassium 3.2 (L) 3.5 - 5.1 mmol/L Chloride 119 (H) 97 - 108 mmol/L    CO2 22 21 - 32 mmol/L    Anion gap 9 5 - 15 mmol/L    Glucose 78 65 - 100 mg/dL    BUN 28 (H) 6 - 20 MG/DL    Creatinine 0.67 0.55 - 1.02 MG/DL    BUN/Creatinine ratio 42 (H) 12 - 20      GFR est AA >60 >60 ml/min/1.73m2    GFR est non-AA >60 >60 ml/min/1.73m2    Calcium 8.4 (L) 8.5 - 10.1 MG/DL        Microbiology:    Studies:      Signed By: Jeremy Suazo DO     February 7, 2022

## 2022-02-07 NOTE — PROGRESS NOTES
General Surgery Daily Progress Note    Patient: Chad Mark MRN: [id number]  SSN: [id number]    YOB: 1955  Age: 75 y.o. Sex: female      Admit Date: 2/4/2022    POD 3 Day Post-Op    Procedure: Procedure(s):  LAPAROSCOPY GENERAL DIAGNOSTIC, CONVERTED TO OPEN, LYSIS OF ADHESIONS, OVERSEWED PERFORATED ULCER    Subjective:   NGT in place  Voided  Pain controlled  Denies N/V  Got 1 unit PRBC yesterday for Hgb 6.7.  Hgb up to 7.5  Still tachycardic but improving slowly  Trying to have a BM    Current Facility-Administered Medications   Medication Dose Route Frequency    potassium chloride 10 mEq in 100 ml IVPB  10 mEq IntraVENous Q1H    iopamidoL (ISOVUE-370) 76 % injection 100 mL  100 mL IntraVENous RAD ONCE    acetaminophen (TYLENOL) suppository 650 mg  650 mg Rectal Q6H PRN    0.9% sodium chloride infusion 250 mL  250 mL IntraVENous PRN    LORazepam (ATIVAN) injection 0.5 mg  0.5 mg IntraVENous Q12H PRN    bisacodyL (DULCOLAX) suppository 10 mg  10 mg Rectal DAILY    pantoprazole (PROTONIX) 40 mg in 0.9% sodium chloride 10 mL injection  40 mg IntraVENous Q12H    fluconazole (DIFLUCAN) 400mg/200 mL IVPB (premix)  400 mg IntraVENous Q24H    piperacillin-tazobactam (ZOSYN) 3.375 g in 0.9% sodium chloride (MBP/ADV) 100 mL MBP  3.375 g IntraVENous Q8H    enoxaparin (LOVENOX) injection 40 mg  40 mg SubCUTAneous Q24H    lactated Ringers infusion  125 mL/hr IntraVENous CONTINUOUS    morphine injection 2 mg  2 mg IntraVENous Q3H PRN        Objective:   02/07 0701 - 02/07 1900  In: -   Out: 30 [Drains:30]  02/05 1901 - 02/07 0700  In: 1759 [I.V.:5116.7]  Out: 960 [Urine:540; Drains:220]  Patient Vitals for the past 8 hrs:   BP Temp Pulse Resp SpO2   02/07/22 0930 (!) 175/76 -- (!) 116 -- 100 %   02/07/22 0742 (!) 164/82 98.4 °F (36.9 °C) (!) 113 17 91 %   02/07/22 0319 (!) 144/75 98.2 °F (36.8 °C) (!) 115 18 94 %       Physical Exam:  General: Alert, cooperative, NAD  Lungs: Unlabored  Abdomen: Soft, appropriate TTP, Dressing in place and C/D/I, HERIBERTO serousang  Extremities: Warm, moves all,   Skin:  Warm and dry, no rash    Labs:   Recent Labs     02/07/22  0607   WBC 14.2*   HGB 7.5*   HCT 22.7*        Recent Labs     02/07/22  0607 02/05/22  0056 02/04/22  1055   *   < > 139   K 3.2*   < > 3.9   *   < > 108   CO2 22   < > 24   GLU 78   < > 143*   BUN 28*   < > 30*   CREA 0.67   < > 0.88   CA 8.4*   < > 8.9   ALB  --   --  2.5*   TBILI  --   --  0.3   ALT  --   --  27    < > = values in this interval not displayed. Assessment / Plan:     POD 3 Day Post-Op    Procedure: Procedure(s):  LAPAROSCOPY GENERAL DIAGNOSTIC, CONVERTED TO OPEN, LYSIS OF ADHESIONS, OVERSEWED PERFORATED ULCER    Continue abx with antifungal  Continue NGT, do not expect much out of it  Continue HERIBERTO  Continue BID PPI  On DVT prophylaxis  Needs PT for her new hip replacement   daily suppository as cannot give anything from above at this time for her constipation    Will get CT chest to rule out PE since her tachycardia is not resolved even though she is fluid resuscitated and got 1u PRBC  WBC is now 14.   Will get CT abd/pelvis to rule out any abscess but will also give some omnipaque to examine for a leak from the katya patch    I discussed plan with patient and nurse  Surgical NP also discussed plan with CT team and nurse      Rossy Padgett MD

## 2022-02-07 NOTE — PROGRESS NOTES
0730: Bedside and Verbal shift change report given to HELIO Velez (oncoming nurse) by [facility]. Yasmany King RN (offgoing nurse). Report included the following information SBAR, Kardex, Intake/Output, MAR and Recent Results.

## 2022-02-07 NOTE — PROGRESS NOTES
POD #6 s/p R LAVERNE. Re-admit for gastric perforation s/p repair 2/5/22. Stable, NGT. Transfusion this am.     PE     Dressing C/D/I     MS intact    A/P Stable  - PT when cleared for ambulation.   - Dressing change in the am by nursing.   - F/u in 7 days.   - Outpt PT once d/c to home.

## 2022-02-08 LAB
ANION GAP SERPL CALC-SCNC: 4 MMOL/L (ref 5–15)
BASOPHILS # BLD: 0 K/UL (ref 0–0.1)
BASOPHILS NFR BLD: 0 % (ref 0–1)
BUN SERPL-MCNC: 21 MG/DL (ref 6–20)
BUN/CREAT SERPL: 33 (ref 12–20)
CALCIUM SERPL-MCNC: 8 MG/DL (ref 8.5–10.1)
CHLORIDE SERPL-SCNC: 119 MMOL/L (ref 97–108)
CO2 SERPL-SCNC: 25 MMOL/L (ref 21–32)
CREAT SERPL-MCNC: 0.64 MG/DL (ref 0.55–1.02)
CRP SERPL-MCNC: 16.5 MG/DL (ref 0–0.6)
DIFFERENTIAL METHOD BLD: ABNORMAL
EOSINOPHIL # BLD: 0.2 K/UL (ref 0–0.4)
EOSINOPHIL NFR BLD: 1 % (ref 0–7)
ERYTHROCYTE [DISTWIDTH] IN BLOOD BY AUTOMATED COUNT: 13.4 % (ref 11.5–14.5)
GLUCOSE SERPL-MCNC: 126 MG/DL (ref 65–100)
HCT VFR BLD AUTO: 21.5 % (ref 35–47)
HGB BLD-MCNC: 7.1 G/DL (ref 11.5–16)
IMM GRANULOCYTES # BLD AUTO: 0.2 K/UL (ref 0–0.04)
IMM GRANULOCYTES NFR BLD AUTO: 1 % (ref 0–0.5)
LYMPHOCYTES # BLD: 1.6 K/UL (ref 0.8–3.5)
LYMPHOCYTES NFR BLD: 11 % (ref 12–49)
MCH RBC QN AUTO: 30.7 PG (ref 26–34)
MCHC RBC AUTO-ENTMCNC: 33 G/DL (ref 30–36.5)
MCV RBC AUTO: 93.1 FL (ref 80–99)
MONOCYTES # BLD: 0.7 K/UL (ref 0–1)
MONOCYTES NFR BLD: 5 % (ref 5–13)
NEUTS SEG # BLD: 11.2 K/UL (ref 1.8–8)
NEUTS SEG NFR BLD: 82 % (ref 32–75)
NRBC # BLD: 0 K/UL (ref 0–0.01)
NRBC BLD-RTO: 0 PER 100 WBC
PLATELET # BLD AUTO: 337 K/UL (ref 150–400)
PMV BLD AUTO: 9.5 FL (ref 8.9–12.9)
POTASSIUM SERPL-SCNC: 3.2 MMOL/L (ref 3.5–5.1)
RBC # BLD AUTO: 2.31 M/UL (ref 3.8–5.2)
SODIUM SERPL-SCNC: 148 MMOL/L (ref 136–145)
WBC # BLD AUTO: 13.8 K/UL (ref 3.6–11)

## 2022-02-08 PROCEDURE — 80048 BASIC METABOLIC PNL TOTAL CA: CPT

## 2022-02-08 PROCEDURE — 86140 C-REACTIVE PROTEIN: CPT

## 2022-02-08 PROCEDURE — 65270000029 HC RM PRIVATE

## 2022-02-08 PROCEDURE — 74011250636 HC RX REV CODE- 250/636: Performed by: INTERNAL MEDICINE

## 2022-02-08 PROCEDURE — 85025 COMPLETE CBC W/AUTO DIFF WBC: CPT

## 2022-02-08 PROCEDURE — 74011000258 HC RX REV CODE- 258: Performed by: SURGERY

## 2022-02-08 PROCEDURE — C9113 INJ PANTOPRAZOLE SODIUM, VIA: HCPCS | Performed by: SURGERY

## 2022-02-08 PROCEDURE — 36415 COLL VENOUS BLD VENIPUNCTURE: CPT

## 2022-02-08 PROCEDURE — 74011250636 HC RX REV CODE- 250/636: Performed by: NURSE PRACTITIONER

## 2022-02-08 PROCEDURE — 99232 SBSQ HOSP IP/OBS MODERATE 35: CPT | Performed by: INTERNAL MEDICINE

## 2022-02-08 PROCEDURE — 74011000250 HC RX REV CODE- 250: Performed by: SURGERY

## 2022-02-08 PROCEDURE — 74011250636 HC RX REV CODE- 250/636: Performed by: SURGERY

## 2022-02-08 PROCEDURE — 74011000258 HC RX REV CODE- 258: Performed by: INTERNAL MEDICINE

## 2022-02-08 RX ORDER — POTASSIUM CHLORIDE 7.45 MG/ML
10 INJECTION INTRAVENOUS
Status: COMPLETED | OUTPATIENT
Start: 2022-02-08 | End: 2022-02-08

## 2022-02-08 RX ORDER — POLYETHYLENE GLYCOL 3350 17 G/17G
17 POWDER, FOR SOLUTION ORAL DAILY
Status: DISCONTINUED | OUTPATIENT
Start: 2022-02-08 | End: 2022-02-09

## 2022-02-08 RX ORDER — POTASSIUM CHLORIDE 7.45 MG/ML
10 INJECTION INTRAVENOUS
Status: DISPENSED | OUTPATIENT
Start: 2022-02-08 | End: 2022-02-08

## 2022-02-08 RX ORDER — DOCUSATE SODIUM 100 MG/1
100 CAPSULE, LIQUID FILLED ORAL DAILY
Status: DISCONTINUED | OUTPATIENT
Start: 2022-02-08 | End: 2022-02-09

## 2022-02-08 RX ORDER — MORPHINE SULFATE 2 MG/ML
2 INJECTION, SOLUTION INTRAMUSCULAR; INTRAVENOUS
Status: DISCONTINUED | OUTPATIENT
Start: 2022-02-08 | End: 2022-02-17 | Stop reason: HOSPADM

## 2022-02-08 RX ORDER — OXYCODONE HYDROCHLORIDE 5 MG/1
5 TABLET ORAL
Status: DISCONTINUED | OUTPATIENT
Start: 2022-02-08 | End: 2022-02-17 | Stop reason: HOSPADM

## 2022-02-08 RX ADMIN — POTASSIUM CHLORIDE 10 MEQ: 7.46 INJECTION, SOLUTION INTRAVENOUS at 08:28

## 2022-02-08 RX ADMIN — POTASSIUM CHLORIDE 10 MEQ: 10 INJECTION, SOLUTION INTRAVENOUS at 16:59

## 2022-02-08 RX ADMIN — POTASSIUM CHLORIDE, DEXTROSE MONOHYDRATE AND SODIUM CHLORIDE 100 ML/HR: 150; 5; 450 INJECTION, SOLUTION INTRAVENOUS at 20:02

## 2022-02-08 RX ADMIN — SODIUM CHLORIDE, PRESERVATIVE FREE 40 MG: 5 INJECTION INTRAVENOUS at 20:03

## 2022-02-08 RX ADMIN — SODIUM CHLORIDE, PRESERVATIVE FREE 40 MG: 5 INJECTION INTRAVENOUS at 10:26

## 2022-02-08 RX ADMIN — FLUCONAZOLE 400 MG: 2 INJECTION, SOLUTION INTRAVENOUS at 20:03

## 2022-02-08 RX ADMIN — POTASSIUM CHLORIDE 10 MEQ: 10 INJECTION, SOLUTION INTRAVENOUS at 18:19

## 2022-02-08 RX ADMIN — AMPICILLIN SODIUM AND SULBACTAM SODIUM 3 G: 2; 1 INJECTION, POWDER, FOR SOLUTION INTRAMUSCULAR; INTRAVENOUS at 20:02

## 2022-02-08 RX ADMIN — PIPERACILLIN AND TAZOBACTAM 3.38 G: 3; .375 INJECTION, POWDER, LYOPHILIZED, FOR SOLUTION INTRAVENOUS at 04:23

## 2022-02-08 RX ADMIN — POTASSIUM CHLORIDE 10 MEQ: 10 INJECTION, SOLUTION INTRAVENOUS at 15:56

## 2022-02-08 RX ADMIN — AMPICILLIN SODIUM AND SULBACTAM SODIUM 3 G: 2; 1 INJECTION, POWDER, FOR SOLUTION INTRAMUSCULAR; INTRAVENOUS at 15:21

## 2022-02-08 RX ADMIN — ENOXAPARIN SODIUM 40 MG: 100 INJECTION SUBCUTANEOUS at 10:21

## 2022-02-08 NOTE — PROGRESS NOTES
General Surgery Daily Progress Note    Patient: Linda Vázquez MRN: [id number]  SSN: [id number]    YOB: 1955  Age: 75 y.o. Sex: female      Admit Date: 2/4/2022    POD 4 Day Post-Op    Procedure: Procedure(s):  LAPAROSCOPY GENERAL DIAGNOSTIC, CONVERTED TO OPEN, LYSIS OF ADHESIONS, OVERSEWED PERFORATED ULCER    Subjective:     Pain controlled  +BM +flatus  Denies N/V  Ambulating and up in chair  Tachycardia improving slowly  PIVs not working- needing new access    CT yest showed pneumonia and poss abd abscess- no leak from gram patch    Current Facility-Administered Medications   Medication Dose Route Frequency    potassium chloride 10 mEq in 100 ml IVPB  10 mEq IntraVENous Q1H    oxyCODONE IR (ROXICODONE) tablet 5 mg  5 mg Oral Q4H PRN    morphine injection 2 mg  2 mg IntraVENous Q3H PRN    ampicillin-sulbactam (UNASYN) 3 g in 0.9% sodium chloride (MBP/ADV) 100 mL MBP  3 g IntraVENous Q6H    polyethylene glycol (MIRALAX) packet 17 g  17 g Oral DAILY    docusate sodium (COLACE) capsule 100 mg  100 mg Oral DAILY    dextrose 5% - 0.45% NaCl with KCl 20 mEq/L infusion  100 mL/hr IntraVENous CONTINUOUS    acetaminophen (TYLENOL) suppository 650 mg  650 mg Rectal Q6H PRN    0.9% sodium chloride infusion 250 mL  250 mL IntraVENous PRN    LORazepam (ATIVAN) injection 0.5 mg  0.5 mg IntraVENous Q12H PRN    bisacodyL (DULCOLAX) suppository 10 mg  10 mg Rectal DAILY    pantoprazole (PROTONIX) 40 mg in 0.9% sodium chloride 10 mL injection  40 mg IntraVENous Q12H    fluconazole (DIFLUCAN) 400mg/200 mL IVPB (premix)  400 mg IntraVENous Q24H    enoxaparin (LOVENOX) injection 40 mg  40 mg SubCUTAneous Q24H        Objective:   No intake/output data recorded. 02/06 1901 - 02/08 0700  In: 2638.3 [P.O.:840;  I.V.:1798.3]  Out: 270 [Drains:180]  Patient Vitals for the past 8 hrs:   BP Temp Pulse Resp SpO2   02/08/22 0803 (!) 141/68 98.7 °F (37.1 °C) 93 18 97 %   02/08/22 0502 (!) 159/81 98.5 °F (36.9 °C) (!) 101 18 95 %       Physical Exam:  General: Alert, cooperative, NAD  Lungs: Unlabored  Abdomen: Soft, appropriate TTP, min distended as expected, Dressing in place and C/D/I, HERIBERTO serosang  Extremities: Warm, moves all,   Skin:  Warm and dry, no rash    Labs:   Recent Labs     02/08/22  0126   WBC 13.8*   HGB 7.1*   HCT 21.5*        Recent Labs     02/08/22  0126   *   K 3.2*   *   CO2 25   *   BUN 21*   CREA 0.64   CA 8.0*       Assessment / Plan:       Procedure: Procedure(s):  LAPAROSCOPY GENERAL DIAGNOSTIC, CONVERTED TO OPEN, LYSIS OF ADHESIONS, OVERSEWED PERFORATED ULCER    Leukocytosis slowly improving  Replete K  Continue abx with antifungal- per ID recs   Continue EHRIBERTO  Continue BID PPI- will need on DC  Adv to fulls  Cont suppository- add miralax and colace  On DVT prophylaxis  Needs PT for her new hip replacement  Cont IS/ambulating frequently        Pt seen and discussed with Dr Marrie Cranker, NP

## 2022-02-08 NOTE — PROGRESS NOTES
Bedside and Verbal shift change report given to [facility] (oncoming nurse) by Luke Mcgregor (offgoing nurse). Report included the following information SBAR, Kardex, Intake/Output, MAR, Recent Results and Med Rec Status.

## 2022-02-08 NOTE — PROGRESS NOTES
1782: Patients IV infiltrated. Attempted to insert another IV but was unsuccessful. Contacted the ICU if they could try an IV, they stated they will see if anybody is available.

## 2022-02-08 NOTE — PROGRESS NOTES
[facility] Infectious Disease Specialists Progress Note           Marilou Hayes DO    [phone] Office  [phone]  Fax    2022      Assessment & Plan:   · Leukocytosis. Likely in part reactive due to surgery, constipation, and extreme discomfort from NG tube. Slightly better today. Narrow antibiotics to fluconazole and Unasyn. Both of these may be deescalated to p.o. equivalents when patient is ready for discharge. Will need discharge on antibiotics for possible developing abdominal abscess. Discussed with surgery. Plans for repeat CT scanning will depend on patient clinical course. In the interim we will follow WBC and CRP trend. · LLL airspace disease. Patient asymptomatic covered for possible aspiration on current antibiotics  ·   Perforated gastric ulcer status post open repair with Cleotha Reason patch 2022  · Recent right TKA 2022  · Anemia. Work-up by primary team          Subjective:     Feeling better today. Objective:     Vitals:   Visit Vitals  BP (!) 141/68 (BP 1 Location: Left upper arm, BP Patient Position: At rest) Comment: Nurse notified    Pulse 93   Temp 98.7 °F (37.1 °C)   Resp 18   Ht 5' 6\" (1.676 m)   Wt 190 lb 0.6 oz (86.2 kg)   SpO2 97%   BMI 30.67 kg/m²        Tmax:  Temp (24hrs), Av.4 °F (36.9 °C), Min:97.8 °F (36.6 °C), Max:98.7 °F (37.1 °C)      Exam:   Patient is intubated:  no    Physical Examination:   General:  Alert, cooperative, no distress   Head:  Normocephalic, atraumatic. Eyes:  Conjunctivae clear   Neck: Supple       Lungs:   No distress. Clear to auscultation   Chest wall:     Heart:  Regular rate and rhythm   Abdomen:    Nondistended. LLL drain with serosanguineous fluid   Extremities: Moves all. Skin:  No rash   Neurologic: CNII-XII intact. Normal strength     Labs:        No lab exists for component: ITNL   No results for input(s): CPK, CKMB, TROIQ in the last 72 hours.   Recent Labs     22  0126 22  0607 22  0927   NA 148* 150* 148*   K 3.2* 3.2* 3.6   * 119* 118*   CO2 25 22 24   BUN 21* 28* 23*   CREA 0.64 0.67 0.75   * 78 88   WBC 13.8* 14.2* 12.6*   HGB 7.1* 7.5* 6.7*   HCT 21.5* 22.7* 20.6*    335 285     No results for input(s): INR, PTP, APTT, INREXT in the last 72 hours. Needs: urine analysis, urine sodium, protein and creatinine  No results found for: SHAYLEE, CREAU      Cultures:     No results found for: SDES  Lab Results   Component Value Date/Time    Culture result: NO GROWTH 4 DAYS 02/04/2022 02:55 PM    Culture result: MRSA NOT PRESENT 01/24/2022 11:13 AM    Culture result:  01/24/2022 11:13 AM     Screening of patient nares for MRSA is for surveillance purposes and, if positive, to facilitate isolation considerations in high risk settings. It is not intended for automatic decolonization interventions per se as regimens are not sufficiently effective to warrant routine use.        Radiology:     Medications       Current Facility-Administered Medications   Medication Dose Route Frequency Last Admin    potassium chloride 10 mEq in 100 ml IVPB  10 mEq IntraVENous Q1H 10 mEq at 02/08/22 0828    oxyCODONE IR (ROXICODONE) tablet 5 mg  5 mg Oral Q4H PRN      morphine injection 2 mg  2 mg IntraVENous Q3H PRN      dextrose 5% - 0.45% NaCl with KCl 20 mEq/L infusion  100 mL/hr IntraVENous CONTINUOUS 100 mL/hr at 02/07/22 1101    acetaminophen (TYLENOL) suppository 650 mg  650 mg Rectal Q6H  mg at 02/06/22 0117    0.9% sodium chloride infusion 250 mL  250 mL IntraVENous PRN      LORazepam (ATIVAN) injection 0.5 mg  0.5 mg IntraVENous Q12H PRN 0.5 mg at 02/06/22 0117    bisacodyL (DULCOLAX) suppository 10 mg  10 mg Rectal DAILY 10 mg at 02/07/22 0830    pantoprazole (PROTONIX) 40 mg in 0.9% sodium chloride 10 mL injection  40 mg IntraVENous Q12H 40 mg at 02/08/22 1026    fluconazole (DIFLUCAN) 400mg/200 mL IVPB (premix)  400 mg IntraVENous Q24H 400 mg at 02/07/22 2006    piperacillin-tazobactam (ZOSYN) 3.375 g in 0.9% sodium chloride (MBP/ADV) 100 mL MBP  3.375 g IntraVENous Q8H 3.375 g at 02/08/22 0423    enoxaparin (LOVENOX) injection 40 mg  40 mg SubCUTAneous Q24H 40 mg at 02/08/22 1021           Case discussed with:      Saulo Garcia DO

## 2022-02-08 NOTE — PROGRESS NOTES
Vascular access team:  Received call from NP that patient needs IV access, midline cath ordered. Will assess patient for US guided IV placement.     Felicia Mota RN

## 2022-02-08 NOTE — PROGRESS NOTES
Patient's IV is flushing, but is not running ordered IV solutions and antibiotics. Multiple attempts at trouble shooting, nurses x3, surgeon informed antibiotic and potassium is not running due to IV site issue, ordered a midline. Awaiting vascular access team and will resume IV treatment course once new line is placed.   Cinthya Lopez, [facility]

## 2022-02-08 NOTE — ROUTINE PROCESS
Bedside shift change report given to Maxim (oncoming nurse) by Loyda (offgoing nurse). Report included the following information SBAR, Kardex, ED Summary, OR Summary, Procedure Summary, Intake/Output, MAR, Recent Results and Med Rec Status.

## 2022-02-08 NOTE — PROGRESS NOTES
Physical Therapy    1210 Pt HGB 7.1 today received up in chair, spouse at bedside. Pt declining to participate with therapy at this time, trying to slowly eat her lunch and anticipating Midline placement soon. Pt reports walking hallways with spouse yesterday and overnight. Pt with questions regarding hip rehab, encouraged walking to benefit both recent hip replacement rehab and abdominal surgery. PT will continue to follow    Kei Bro. Yonas Kumar

## 2022-02-08 NOTE — PROGRESS NOTES
2/8/2022  4:04 PM  Update via chart review, pt is continuing to require medical management for perforated ulcer, leukocytosis, LLL airspace disease, recent Rt TKA 1/31/22, anemia   Transition of Care Plan:       RUR 8 %  LOS 4 Days  1. Medical management continues  2. POD # 3  s/p open repair perforated ulcer   3 . ID following  4. CM to follow through for treatment/response  5. 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 DC when stable to home w/ family assistance  7. Outpatient f/u PCP, ortho, specialists   8.  plans on being transport at discharge.   OLY Bustillos

## 2022-02-09 LAB
ANION GAP SERPL CALC-SCNC: 5 MMOL/L (ref 5–15)
BACTERIA SPEC CULT: NORMAL
BASOPHILS # BLD: 0.1 K/UL (ref 0–0.1)
BASOPHILS NFR BLD: 1 % (ref 0–1)
BUN SERPL-MCNC: 13 MG/DL (ref 6–20)
BUN/CREAT SERPL: 23 (ref 12–20)
CALCIUM SERPL-MCNC: 8.1 MG/DL (ref 8.5–10.1)
CHLORIDE SERPL-SCNC: 113 MMOL/L (ref 97–108)
CO2 SERPL-SCNC: 26 MMOL/L (ref 21–32)
CREAT SERPL-MCNC: 0.57 MG/DL (ref 0.55–1.02)
DIFFERENTIAL METHOD BLD: ABNORMAL
EOSINOPHIL # BLD: 0.1 K/UL (ref 0–0.4)
EOSINOPHIL NFR BLD: 1 % (ref 0–7)
ERYTHROCYTE [DISTWIDTH] IN BLOOD BY AUTOMATED COUNT: 13.2 % (ref 11.5–14.5)
GLUCOSE SERPL-MCNC: 114 MG/DL (ref 65–100)
HCT VFR BLD AUTO: 22.7 % (ref 35–47)
HGB BLD-MCNC: 7.5 G/DL (ref 11.5–16)
IMM GRANULOCYTES # BLD AUTO: 0 K/UL (ref 0–0.04)
IMM GRANULOCYTES NFR BLD AUTO: 0 % (ref 0–0.5)
LYMPHOCYTES # BLD: 2.6 K/UL (ref 0.8–3.5)
LYMPHOCYTES NFR BLD: 20 % (ref 12–49)
MCH RBC QN AUTO: 31 PG (ref 26–34)
MCHC RBC AUTO-ENTMCNC: 33 G/DL (ref 30–36.5)
MCV RBC AUTO: 93.8 FL (ref 80–99)
MONOCYTES # BLD: 0.8 K/UL (ref 0–1)
MONOCYTES NFR BLD: 6 % (ref 5–13)
MYELOCYTES NFR BLD MANUAL: 1 %
NEUTS SEG # BLD: 9.4 K/UL (ref 1.8–8)
NEUTS SEG NFR BLD: 71 % (ref 32–75)
NRBC # BLD: 0 K/UL (ref 0–0.01)
NRBC BLD-RTO: 0 PER 100 WBC
PLATELET # BLD AUTO: 361 K/UL (ref 150–400)
PMV BLD AUTO: 10.1 FL (ref 8.9–12.9)
POTASSIUM SERPL-SCNC: 3.4 MMOL/L (ref 3.5–5.1)
RBC # BLD AUTO: 2.42 M/UL (ref 3.8–5.2)
RBC MORPH BLD: ABNORMAL
SERVICE CMNT-IMP: NORMAL
SODIUM SERPL-SCNC: 144 MMOL/L (ref 136–145)
WBC # BLD AUTO: 13.2 K/UL (ref 3.6–11)

## 2022-02-09 PROCEDURE — C9113 INJ PANTOPRAZOLE SODIUM, VIA: HCPCS | Performed by: SURGERY

## 2022-02-09 PROCEDURE — 74011000250 HC RX REV CODE- 250: Performed by: SURGERY

## 2022-02-09 PROCEDURE — 85025 COMPLETE CBC W/AUTO DIFF WBC: CPT

## 2022-02-09 PROCEDURE — 74011000258 HC RX REV CODE- 258: Performed by: INTERNAL MEDICINE

## 2022-02-09 PROCEDURE — 74011250636 HC RX REV CODE- 250/636: Performed by: SURGERY

## 2022-02-09 PROCEDURE — 65270000029 HC RM PRIVATE

## 2022-02-09 PROCEDURE — 36415 COLL VENOUS BLD VENIPUNCTURE: CPT

## 2022-02-09 PROCEDURE — 80048 BASIC METABOLIC PNL TOTAL CA: CPT

## 2022-02-09 PROCEDURE — 74011250636 HC RX REV CODE- 250/636: Performed by: NURSE PRACTITIONER

## 2022-02-09 PROCEDURE — 99232 SBSQ HOSP IP/OBS MODERATE 35: CPT | Performed by: INTERNAL MEDICINE

## 2022-02-09 PROCEDURE — 74011250636 HC RX REV CODE- 250/636: Performed by: INTERNAL MEDICINE

## 2022-02-09 RX ORDER — POTASSIUM CHLORIDE 7.45 MG/ML
10 INJECTION INTRAVENOUS
Status: COMPLETED | OUTPATIENT
Start: 2022-02-09 | End: 2022-02-09

## 2022-02-09 RX ADMIN — AMPICILLIN SODIUM AND SULBACTAM SODIUM 3 G: 2; 1 INJECTION, POWDER, FOR SOLUTION INTRAMUSCULAR; INTRAVENOUS at 20:08

## 2022-02-09 RX ADMIN — SODIUM CHLORIDE, PRESERVATIVE FREE 40 MG: 5 INJECTION INTRAVENOUS at 10:14

## 2022-02-09 RX ADMIN — POTASSIUM CHLORIDE 10 MEQ: 7.46 INJECTION, SOLUTION INTRAVENOUS at 12:00

## 2022-02-09 RX ADMIN — AMPICILLIN SODIUM AND SULBACTAM SODIUM 3 G: 2; 1 INJECTION, POWDER, FOR SOLUTION INTRAMUSCULAR; INTRAVENOUS at 02:33

## 2022-02-09 RX ADMIN — POTASSIUM CHLORIDE 10 MEQ: 7.46 INJECTION, SOLUTION INTRAVENOUS at 11:44

## 2022-02-09 RX ADMIN — SODIUM CHLORIDE, PRESERVATIVE FREE 40 MG: 5 INJECTION INTRAVENOUS at 20:08

## 2022-02-09 RX ADMIN — ENOXAPARIN SODIUM 40 MG: 100 INJECTION SUBCUTANEOUS at 10:15

## 2022-02-09 RX ADMIN — FLUCONAZOLE 400 MG: 2 INJECTION, SOLUTION INTRAVENOUS at 20:08

## 2022-02-09 RX ADMIN — AMPICILLIN SODIUM AND SULBACTAM SODIUM 3 G: 2; 1 INJECTION, POWDER, FOR SOLUTION INTRAMUSCULAR; INTRAVENOUS at 10:14

## 2022-02-09 RX ADMIN — AMPICILLIN SODIUM AND SULBACTAM SODIUM 3 G: 2; 1 INJECTION, POWDER, FOR SOLUTION INTRAMUSCULAR; INTRAVENOUS at 15:36

## 2022-02-09 NOTE — PROGRESS NOTES
Patient received an endurance line this shift to [facility]. Pharmacy rescheduled IV meds due to IV line not delivering IV meds. D/C'd IV's no longer needed. See Avatar or Lines notations for further info. Patient has had several bms this shift. Able to walk with walker unassisted. No s/s of distress. No SOB. No c/o pain. Eating slowly and soft formed foods at this time. shift change report given to HELIO Pan (oncoming nurse) by Ashish Bass (offgoing nurse). Report included the following information SBAR, Kardex, Intake/Output and MAR.

## 2022-02-09 NOTE — PROGRESS NOTES
SURGERY PROGRESS NOTE      Admit Date: 2022    POD 5 Days Post-Op    Procedure: Procedure(s):  LAPAROSCOPY GENERAL DIAGNOSTIC, CONVERTED TO OPEN, LYSIS OF ADHESIONS, OVERSEWED PERFORATED ULCER      Subjective:   Feels well  Tolerating fulls  +BM      Objective:     Visit Vitals  BP (!) 160/80 (BP 1 Location: Left upper arm, BP Patient Position: Sitting)   Pulse (!) 101   Temp 98.7 °F (37.1 °C)   Resp 14   Ht 5' 6\" (1.676 m)   Wt 86.2 kg (190 lb 0.6 oz)   SpO2 94%   BMI 30.67 kg/m²        Temp (24hrs), Av.7 °F (37.1 °C), Min:98.5 °F (36.9 °C), Max:99.1 °F (37.3 °C)      Physical Exam:     Abdomen:  Soft. Non-tender, non-distended.   Incision C/D/I. HERIBERTO serous        Lab Results   Component Value Date/Time    WBC 13.2 (H) 2022 05:58 AM    HGB 7.5 (L) 2022 05:58 AM    HCT 22.7 (L) 2022 05:58 AM    PLATELET 267  05:58 AM    MCV 93.8 2022 05:58 AM     Lab Results   Component Value Date/Time    GFR est non-AA >60 2022 05:58 AM    GFR est AA >60 2022 05:58 AM    Creatinine 0.57 2022 05:58 AM    BUN 13 2022 05:58 AM    Sodium 144 2022 05:58 AM    Potassium 3.4 (L) 2022 05:58 AM    Chloride 113 (H) 2022 05:58 AM    CO2 26 2022 05:58 AM       Assessment:     Active Problems:    Gastric ulcer (2022)        Plan/Recommendations/Medical Decision Making:   WBC stable  Drain remains serous  Continue Unasyn today - convert to PO tomorrow  advance diet  Hopefully home in am if continues to do well

## 2022-02-09 NOTE — PROGRESS NOTES
PHYSICAL THERAPY:Pt declined mobilizing with PT earlier today. PT will follow later today if time allows.

## 2022-02-09 NOTE — PROGRESS NOTES
Bedside shift change report given to [id number] Devendra (oncoming nurse) by Nishi Sales RN (offgoing nurse). Report included the following information SBAR, Intake/Output, MAR and Med Rec Status.

## 2022-02-09 NOTE — PROGRESS NOTES
[facility] Infectious Disease Specialists Progress Note           Basilia Cantu DO    [phone] Office  [phone]  Fax    2022      Assessment & Plan:   · Leukocytosis. Multifactorial.  Slowly trending down. Continue Unasyn and fluconazole. May de-escalate to oral Augmentin and fluconazole at discharge. Total duration of therapy not yet determined. Plans for repeat CT scanning will depend on patient clinical course. In the interim we will follow WBC and CRP trend. · LLL airspace disease. Patient asymptomatic covered for possible aspiration on current antibiotics  ·   Perforated gastric ulcer status post open repair with Mandy Cony patch 2022  · Recent right TKA 2022  · Anemia. Work-up by primary team          Subjective:     Feeling better today. Tolerating soft diet    Objective:     Vitals:   Visit Vitals  BP (!) 162/78 (BP 1 Location: Right upper arm, BP Patient Position: Sitting) Comment: Radha (RN) notified by Mary Lemus (Student RN)   Pulse (!) 103 Comment: Radha (RN) notified by Mary Lemus (Student RN)   Temp 99.1 °F (37.3 °C)   Resp 16   Ht 5' 6\" (1.676 m)   Wt 190 lb 0.6 oz (86.2 kg)   SpO2 92%   BMI 30.67 kg/m²        Tmax:  Temp (24hrs), Av.6 °F (37 °C), Min:98.1 °F (36.7 °C), Max:99.1 °F (37.3 °C)      Exam:   Patient is intubated:  no    Physical Examination:   General:  Alert, cooperative, no distress   Head:  Normocephalic, atraumatic. Eyes:  Conjunctivae clear   Neck: Supple       Lungs:   No distress. Chest wall:     Heart:     Abdomen:    Nondistended   Extremities: Moves all. Skin:  No rash   Neurologic: CNII-XII intact. Normal strength     Labs:        No lab exists for component: ITNL   No results for input(s): CPK, CKMB, TROIQ in the last 72 hours.   Recent Labs     22  0558 22  0126 22  0607    148* 150*   K 3.4* 3.2* 3.2*   * 119* 119*   CO2 26 25 22   BUN 13 21* 28*   CREA 0.57 0.64 0.67   * 126* 78   WBC 13.2* 13.8* 14.2* HGB 7.5* 7.1* 7.5*   HCT 22.7* 21.5* 22.7*    337 335     No results for input(s): INR, PTP, APTT, INREXT, INREXT in the last 72 hours. Needs: urine analysis, urine sodium, protein and creatinine  No results found for: SHAYLEE, CREAU      Cultures:     No results found for: SDES  Lab Results   Component Value Date/Time    Culture result: NO GROWTH 5 DAYS 02/04/2022 02:55 PM    Culture result: MRSA NOT PRESENT 01/24/2022 11:13 AM    Culture result:  01/24/2022 11:13 AM     Screening of patient nares for MRSA is for surveillance purposes and, if positive, to facilitate isolation considerations in high risk settings. It is not intended for automatic decolonization interventions per se as regimens are not sufficiently effective to warrant routine use.        Radiology:     Medications       Current Facility-Administered Medications   Medication Dose Route Frequency Last Admin    potassium chloride 10 mEq in 100 ml IVPB  10 mEq IntraVENous Q1H      oxyCODONE IR (ROXICODONE) tablet 5 mg  5 mg Oral Q4H PRN      morphine injection 2 mg  2 mg IntraVENous Q3H PRN      polyethylene glycol (MIRALAX) packet 17 g  17 g Oral DAILY      docusate sodium (COLACE) capsule 100 mg  100 mg Oral DAILY      ampicillin-sulbactam (UNASYN) 3 g in 0.9% sodium chloride (MBP/ADV) 100 mL MBP  3 g IntraVENous Q6H 3 g at 02/09/22 0233    dextrose 5% - 0.45% NaCl with KCl 20 mEq/L infusion  100 mL/hr IntraVENous CONTINUOUS 100 mL/hr at 02/08/22 2002    acetaminophen (TYLENOL) suppository 650 mg  650 mg Rectal Q6H  mg at 02/06/22 0117    0.9% sodium chloride infusion 250 mL  250 mL IntraVENous PRN      LORazepam (ATIVAN) injection 0.5 mg  0.5 mg IntraVENous Q12H PRN 0.5 mg at 02/06/22 0117    bisacodyL (DULCOLAX) suppository 10 mg  10 mg Rectal DAILY 10 mg at 02/07/22 0830    pantoprazole (PROTONIX) 40 mg in 0.9% sodium chloride 10 mL injection  40 mg IntraVENous Q12H 40 mg at 02/08/22 2003    fluconazole (DIFLUCAN) 400mg/200 mL IVPB (premix)  400 mg IntraVENous Q24H 400 mg at 02/08/22 2003    enoxaparin (LOVENOX) injection 40 mg  40 mg SubCUTAneous Q24H 40 mg at 02/08/22 1021           Case discussed with:      Beau Chawla DO

## 2022-02-09 NOTE — PROGRESS NOTES
Comprehensive Nutrition Assessment    Type and Reason for Visit: Initial,RD nutrition re-screen/LOS    Nutrition Recommendations/Plan:   1. Continue regular, [facility] [place] as able    Nutrition Assessment:      Pt is a 75year old female admitted with Gastric ulcer [K25.9]. She  has a past medical history of Anxiety, COVID-19 vaccine administered, and White coat syndrome without diagnosis of hypertension. Noted 5# (2.5%) weight loss x 2 weeks, not clinically significant for timeframe but being monitored. PO averaging 25% on liquid diet. Diet just advanced to [facility] [place]. Patient voicing some hunger, looking forward to food. Currently on 2L O2 per flowsheetes. Has had BM. NKFA. No complaints at this time. Declined protein supplement at this time. Wt Readings from Last 10 Encounters:   02/04/22 86.2 kg (190 lb 0.6 oz)   01/31/22 88.3 kg (194 lb 10.7 oz)   01/24/22 88.9 kg (195 lb 15.8 oz)     Last 3 Recorded Weights in this Encounter    02/04/22 1035 02/04/22 1601   Weight: 86.2 kg (190 lb) 86.2 kg (190 lb 0.6 oz)     Malnutrition Assessment:  Malnutrition Status:  Mild malnutrition    Context:  Acute illness     Findings of the 6 clinical characteristics of malnutrition:   Energy Intake:  No significant decrease in energy intake  Weight Loss:  1 - 1% to 2% over 1 week (2.5% x 2 weeks)     Body Fat Loss:  No significant body fat loss,     Muscle Mass Loss:  No significant muscle mass loss,    Fluid Accumulation:  No significant fluid accumulation,     Strength:  Not performed     Estimated Daily Nutrient Needs:  Energy (kcal): 1858 (MSJ x 1.2 x 1.1); Weight Used for Energy Requirements: Current  Protein (g):  (1.0-1.2); Weight Used for Protein Requirements: Current  Fluid (ml/day): 1858; Method Used for Fluid Requirements: 1 ml/kcal    Nutrition Related Findings:   ABD: Poor appetite with active bowel sounds 2/8  Last BM: 2/9  Edema: No data recorded    Nutr.  Labs:  Lab Results   Component Value Date/Time    GFR est AA >60 02/09/2022 05:58 AM    GFR est non-AA >60 02/09/2022 05:58 AM    Creatinine 0.57 02/09/2022 05:58 AM    BUN 13 02/09/2022 05:58 AM    Sodium 144 02/09/2022 05:58 AM    Potassium 3.4 (L) 02/09/2022 05:58 AM    Chloride 113 (H) 02/09/2022 05:58 AM    CO2 26 02/09/2022 05:58 AM     Lab Results   Component Value Date/Time    Glucose 114 (H) 02/09/2022 05:58 AM     Lab Results   Component Value Date/Time    Hemoglobin A1c 5.6 01/24/2022 11:13 AM       Nutr. Meds:  Lovenox, Protonix    Wounds:    Surgical incision       Current Nutrition Therapies:  ADULT DIET Regular; [facility] [place] (GERD/Peptic Ulcer)    Anthropometric Measures:  · Height:  5' 6\" (167.6 cm)  · Current Body Wt:  86.2 kg (190 lb 0.6 oz)   · Admission Body Wt:  190 lb    · Usual Body Wt:        · Ideal Body Wt:  130 lbs:  146.2 %   Body mass index is 30.67 kg/m². · BMI Category:  Obese class 1 (BMI 30.0-34. 9)       Nutrition Diagnosis:   · Mild malnutrition related to acute injury/trauma,increased demand for energy/nutrients as evidenced by  (s/p surgery)    Nutrition Interventions:   Food and/or Nutrient Delivery: Continue current diet  Nutrition Education and Counseling: No recommendations at this time  Coordination of Nutrition Care: Continue to monitor while inpatient,Interdisciplinary rounds    Goals:  PO intake >/= 80% estimated protein needs within 3 - 5 days       Nutrition Monitoring and Evaluation:   Behavioral-Environmental Outcomes: None identified  Food/Nutrient Intake Outcomes: Food and nutrient intake  Physical Signs/Symptoms Outcomes: Biochemical data,Skin,Weight,GI status    Discharge Planning:     Too soon to determine     Electronically signed by Jared Mills RD on 1/8/1407   Contact: [phone] or via [employer]

## 2022-02-09 NOTE — PROGRESS NOTES
Bedside and Verbal shift change report given to HELIO Ha (oncoming nurse) by Sheron Paulino (offgoing nurse). Report included the following information SBAR, Kardex, Intake/Output, MAR, Recent Results and Med Rec Status.

## 2022-02-09 NOTE — PROGRESS NOTES
2/9/2022  10:53 AM  Update via chart review, pt is continuing to require medical management for perforated ulcer, leukocytosis, LLL airspace disease, recent Rt TKA 1/31/22, anemia   Transition of Care Plan:      RUR 6 %  LOS 5 Days  1. Medical management continues  2. POD # 4  s/p open repair perforated ulcer   3 . ID following, likely will DC on PO ABx  4.  CM to follow through for treatment/response  5. 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 DC when stable to home w/ family assistance  7. Outpatient f/u PCP, ortho, specialists   8.  plans on being transport at discharge.   9. CM will follow and assist w/ DC needs  OLY Strauss

## 2022-02-10 ENCOUNTER — APPOINTMENT (OUTPATIENT)
Dept: CT IMAGING | Age: 67
DRG: 327 | End: 2022-02-10
Attending: NURSE PRACTITIONER
Payer: COMMERCIAL

## 2022-02-10 LAB
ANION GAP SERPL CALC-SCNC: 6 MMOL/L (ref 5–15)
ANION GAP SERPL CALC-SCNC: 7 MMOL/L (ref 5–15)
BASOPHILS # BLD: 0 K/UL (ref 0–0.1)
BASOPHILS # BLD: 0 K/UL (ref 0–0.1)
BASOPHILS NFR BLD: 0 % (ref 0–1)
BASOPHILS NFR BLD: 0 % (ref 0–1)
BUN SERPL-MCNC: 5 MG/DL (ref 6–20)
BUN SERPL-MCNC: 7 MG/DL (ref 6–20)
BUN/CREAT SERPL: 13 (ref 12–20)
BUN/CREAT SERPL: 19 (ref 12–20)
CALCIUM SERPL-MCNC: 5.3 MG/DL (ref 8.5–10.1)
CALCIUM SERPL-MCNC: 7.9 MG/DL (ref 8.5–10.1)
CHLORIDE SERPL-SCNC: 110 MMOL/L (ref 97–108)
CHLORIDE SERPL-SCNC: 124 MMOL/L (ref 97–108)
CO2 SERPL-SCNC: 16 MMOL/L (ref 21–32)
CO2 SERPL-SCNC: 26 MMOL/L (ref 21–32)
CREAT SERPL-MCNC: 0.27 MG/DL (ref 0.55–1.02)
CREAT SERPL-MCNC: 0.55 MG/DL (ref 0.55–1.02)
DIFFERENTIAL METHOD BLD: ABNORMAL
DIFFERENTIAL METHOD BLD: ABNORMAL
EOSINOPHIL # BLD: 0.1 K/UL (ref 0–0.4)
EOSINOPHIL # BLD: 0.2 K/UL (ref 0–0.4)
EOSINOPHIL NFR BLD: 1 % (ref 0–7)
EOSINOPHIL NFR BLD: 1 % (ref 0–7)
ERYTHROCYTE [DISTWIDTH] IN BLOOD BY AUTOMATED COUNT: 12.9 % (ref 11.5–14.5)
ERYTHROCYTE [DISTWIDTH] IN BLOOD BY AUTOMATED COUNT: 13.1 % (ref 11.5–14.5)
GLUCOSE SERPL-MCNC: 102 MG/DL (ref 65–100)
GLUCOSE SERPL-MCNC: 107 MG/DL (ref 65–100)
HCT VFR BLD AUTO: 18 % (ref 35–47)
HCT VFR BLD AUTO: 23.4 % (ref 35–47)
HGB BLD-MCNC: 5.7 G/DL (ref 11.5–16)
HGB BLD-MCNC: 7.7 G/DL (ref 11.5–16)
IMM GRANULOCYTES # BLD AUTO: 0.2 K/UL (ref 0–0.04)
IMM GRANULOCYTES # BLD AUTO: 0.3 K/UL (ref 0–0.04)
IMM GRANULOCYTES NFR BLD AUTO: 2 % (ref 0–0.5)
IMM GRANULOCYTES NFR BLD AUTO: 2 % (ref 0–0.5)
LYMPHOCYTES # BLD: 1.5 K/UL (ref 0.8–3.5)
LYMPHOCYTES # BLD: 1.9 K/UL (ref 0.8–3.5)
LYMPHOCYTES NFR BLD: 12 % (ref 12–49)
LYMPHOCYTES NFR BLD: 14 % (ref 12–49)
MCH RBC QN AUTO: 30 PG (ref 26–34)
MCH RBC QN AUTO: 30.5 PG (ref 26–34)
MCHC RBC AUTO-ENTMCNC: 31.7 G/DL (ref 30–36.5)
MCHC RBC AUTO-ENTMCNC: 32.9 G/DL (ref 30–36.5)
MCV RBC AUTO: 91.1 FL (ref 80–99)
MCV RBC AUTO: 96.3 FL (ref 80–99)
MONOCYTES # BLD: 0.5 K/UL (ref 0–1)
MONOCYTES # BLD: 0.8 K/UL (ref 0–1)
MONOCYTES NFR BLD: 5 % (ref 5–13)
MONOCYTES NFR BLD: 5 % (ref 5–13)
NEUTS SEG # BLD: 12.3 K/UL (ref 1.8–8)
NEUTS SEG # BLD: 8.3 K/UL (ref 1.8–8)
NEUTS SEG NFR BLD: 78 % (ref 32–75)
NEUTS SEG NFR BLD: 80 % (ref 32–75)
NRBC # BLD: 0 K/UL (ref 0–0.01)
NRBC # BLD: 0 K/UL (ref 0–0.01)
NRBC BLD-RTO: 0 PER 100 WBC
NRBC BLD-RTO: 0 PER 100 WBC
PLATELET # BLD AUTO: 190 K/UL (ref 150–400)
PLATELET # BLD AUTO: 351 K/UL (ref 150–400)
PMV BLD AUTO: 9.4 FL (ref 8.9–12.9)
PMV BLD AUTO: 9.8 FL (ref 8.9–12.9)
POTASSIUM SERPL-SCNC: 2.4 MMOL/L (ref 3.5–5.1)
POTASSIUM SERPL-SCNC: 3.4 MMOL/L (ref 3.5–5.1)
RBC # BLD AUTO: 1.87 M/UL (ref 3.8–5.2)
RBC # BLD AUTO: 2.57 M/UL (ref 3.8–5.2)
RBC MORPH BLD: ABNORMAL
RBC MORPH BLD: ABNORMAL
SODIUM SERPL-SCNC: 142 MMOL/L (ref 136–145)
SODIUM SERPL-SCNC: 147 MMOL/L (ref 136–145)
WBC # BLD AUTO: 10.6 K/UL (ref 3.6–11)
WBC # BLD AUTO: 15.5 K/UL (ref 3.6–11)

## 2022-02-10 PROCEDURE — 85025 COMPLETE CBC W/AUTO DIFF WBC: CPT

## 2022-02-10 PROCEDURE — C9113 INJ PANTOPRAZOLE SODIUM, VIA: HCPCS | Performed by: SURGERY

## 2022-02-10 PROCEDURE — 80048 BASIC METABOLIC PNL TOTAL CA: CPT

## 2022-02-10 PROCEDURE — 77030027138 HC INCENT SPIROMETER -A

## 2022-02-10 PROCEDURE — 65270000029 HC RM PRIVATE

## 2022-02-10 PROCEDURE — 74011000250 HC RX REV CODE- 250: Performed by: SURGERY

## 2022-02-10 PROCEDURE — 74011000636 HC RX REV CODE- 636: Performed by: RADIOLOGY

## 2022-02-10 PROCEDURE — 74011250636 HC RX REV CODE- 250/636: Performed by: SURGERY

## 2022-02-10 PROCEDURE — 74011250636 HC RX REV CODE- 250/636: Performed by: INTERNAL MEDICINE

## 2022-02-10 PROCEDURE — 74011250636 HC RX REV CODE- 250/636: Performed by: NURSE PRACTITIONER

## 2022-02-10 PROCEDURE — 36415 COLL VENOUS BLD VENIPUNCTURE: CPT

## 2022-02-10 PROCEDURE — 74011000258 HC RX REV CODE- 258: Performed by: INTERNAL MEDICINE

## 2022-02-10 PROCEDURE — 99232 SBSQ HOSP IP/OBS MODERATE 35: CPT | Performed by: INTERNAL MEDICINE

## 2022-02-10 PROCEDURE — 71260 CT THORAX DX C+: CPT

## 2022-02-10 RX ORDER — POTASSIUM CHLORIDE 7.45 MG/ML
10 INJECTION INTRAVENOUS
Status: COMPLETED | OUTPATIENT
Start: 2022-02-10 | End: 2022-02-10

## 2022-02-10 RX ORDER — ENOXAPARIN SODIUM 100 MG/ML
80 INJECTION SUBCUTANEOUS EVERY 12 HOURS
Status: DISCONTINUED | OUTPATIENT
Start: 2022-02-10 | End: 2022-02-10

## 2022-02-10 RX ORDER — ENOXAPARIN SODIUM 100 MG/ML
40 INJECTION SUBCUTANEOUS ONCE
Status: COMPLETED | OUTPATIENT
Start: 2022-02-10 | End: 2022-02-10

## 2022-02-10 RX ORDER — ENOXAPARIN SODIUM 100 MG/ML
80 INJECTION SUBCUTANEOUS ONCE
Status: COMPLETED | OUTPATIENT
Start: 2022-02-10 | End: 2022-02-10

## 2022-02-10 RX ADMIN — POTASSIUM CHLORIDE 10 MEQ: 7.46 INJECTION, SOLUTION INTRAVENOUS at 12:07

## 2022-02-10 RX ADMIN — PIPERACILLIN AND TAZOBACTAM 3.38 G: 3; .375 INJECTION, POWDER, LYOPHILIZED, FOR SOLUTION INTRAVENOUS at 20:51

## 2022-02-10 RX ADMIN — ENOXAPARIN SODIUM 40 MG: 100 INJECTION SUBCUTANEOUS at 08:33

## 2022-02-10 RX ADMIN — AMPICILLIN SODIUM AND SULBACTAM SODIUM 3 G: 2; 1 INJECTION, POWDER, FOR SOLUTION INTRAMUSCULAR; INTRAVENOUS at 05:29

## 2022-02-10 RX ADMIN — POTASSIUM CHLORIDE 10 MEQ: 7.46 INJECTION, SOLUTION INTRAVENOUS at 13:26

## 2022-02-10 RX ADMIN — SODIUM CHLORIDE, PRESERVATIVE FREE 40 MG: 5 INJECTION INTRAVENOUS at 08:33

## 2022-02-10 RX ADMIN — SODIUM CHLORIDE, PRESERVATIVE FREE 40 MG: 5 INJECTION INTRAVENOUS at 20:50

## 2022-02-10 RX ADMIN — ENOXAPARIN SODIUM 80 MG: 100 INJECTION SUBCUTANEOUS at 20:50

## 2022-02-10 RX ADMIN — FLUCONAZOLE 400 MG: 2 INJECTION, SOLUTION INTRAVENOUS at 20:52

## 2022-02-10 RX ADMIN — POTASSIUM CHLORIDE, DEXTROSE MONOHYDRATE AND SODIUM CHLORIDE 100 ML/HR: 150; 5; 450 INJECTION, SOLUTION INTRAVENOUS at 17:14

## 2022-02-10 RX ADMIN — IOPAMIDOL 100 ML: 755 INJECTION, SOLUTION INTRAVENOUS at 10:28

## 2022-02-10 RX ADMIN — ENOXAPARIN SODIUM 40 MG: 100 INJECTION SUBCUTANEOUS at 12:07

## 2022-02-10 RX ADMIN — PIPERACILLIN AND TAZOBACTAM 3.38 G: 3; .375 INJECTION, POWDER, LYOPHILIZED, FOR SOLUTION INTRAVENOUS at 14:23

## 2022-02-10 RX ADMIN — AMPICILLIN SODIUM AND SULBACTAM SODIUM 3 G: 2; 1 INJECTION, POWDER, FOR SOLUTION INTRAMUSCULAR; INTRAVENOUS at 08:33

## 2022-02-10 NOTE — PROGRESS NOTES
0745  With AM labs, pt's hgb dropped from 7.5 to 5.7 and K from 3.4 to 2.4. Labs obtained per nightshift via endurance cath. Spoke with lab, pt to get labs recollected redrawn via peripheral stick. 0750  Labs obtained and sent down. 1050  Received call from radiologist, Dr. Haley Horvath. Pt's CT with concerns of abscesses in her abdomen and R hip. Pt also now with R PE. Notified Dr. Edgar Valiente. Orders to consult ortho. Will continue to monitor.

## 2022-02-10 NOTE — PROGRESS NOTES
Physical Therapy Note:    PT treatment deferred. Pt with newly diagnosed PEs with therapeutic adjustment to Lovenox initiated at 12:07 today. Pt also pending orthopedic consult with imaging report stating fluid collection at R hip. Will continue to follow per POC and proceed with PT interventions when appropriate. Lyly Ibarra, PT, DPT, Jimenez Ronquillo

## 2022-02-10 NOTE — PROGRESS NOTES
Bedside shift change report given to [id number] Marion Devendra (oncoming nurse) by Aviva Richard RN (offgoing nurse). Report included the following information SBAR, Intake/Output, MAR and Recent Results.

## 2022-02-10 NOTE — PROGRESS NOTES
[facility] Progress Note           Gaby Allen DO    [phone] Office  [phone]  Fax    2/10/2022      Assessment & Plan:   · Suspected pelvic abscess. IR to drain tomorrow. Antibiotics broadened to piperacillin-tazobactam.  Continue fluconazole  · Right hip fluid collection  Thought to be expected postoperative changes per orthopedic surgery. No intervention planned   · Leukocytosis. Up today. Antibiotics adjusted and plans for drainage of pelvic collection as above. Follow trend   · Pulmonary embolus. May be contributing to leukocytosis. Anticoagulation per team  · LLL airspace disease. Patient asymptomatic covered for possible aspiration on current antibiotics  ·   Perforated gastric ulcer status post open repair with Tanner Smiles patch 2022  · Recent right TKA 2022  · Anemia. Work-up by primary team          Subjective:     WBC up today. Patient feeling better overall    Objective:     Vitals:   Visit Vitals  BP (!) 145/71 (BP 1 Location: Left upper arm, BP Patient Position: Sitting)   Pulse (!) 103   Temp 98.6 °F (37 °C)   Resp 18   Ht 5' 6\" (1.676 m)   Wt 190 lb 0.6 oz (86.2 kg)   SpO2 95%   BMI 30.67 kg/m²        Tmax:  Temp (24hrs), Av °F (37.2 °C), Min:98.2 °F (36.8 °C), Max:99.4 °F (37.4 °C)      Exam:   Patient is intubated:  no    Physical Examination:   General:  Alert, cooperative, no distress   Head:  Normocephalic, atraumatic. Eyes:  Conjunctivae clear   Neck: Supple       Lungs:   No distress. Chest wall:     Heart:     Abdomen:    Nondistended   Extremities: Moves all. Skin:  No rash   Neurologic: CNII-XII intact. Normal strength     Labs:        No lab exists for component: ITNL   No results for input(s): CPK, CKMB, TROIQ in the last 72 hours.   Recent Labs     02/10/22  0748 02/10/22  0608 22  0558    147* 144   K 3.4* 2.4* 3.4*   * 124* 113*   CO2 26 16* 26   BUN 7 5* 13   CREA 0.55 0.27* 0.57   * 102* 114* WBC 15.5* 10.6 13.2*   HGB 7.7* 5.7* 7.5*   HCT 23.4* 18.0* 22.7*    190 361     No results for input(s): INR, PTP, APTT, INREXT, INREXT in the last 72 hours. Needs: urine analysis, urine sodium, protein and creatinine  No results found for: SHAYLEE, CREAU      Cultures:     No results found for: SDES  Lab Results   Component Value Date/Time    Culture result: NO GROWTH 5 DAYS 02/04/2022 02:55 PM    Culture result: MRSA NOT PRESENT 01/24/2022 11:13 AM    Culture result:  01/24/2022 11:13 AM     Screening of patient nares for MRSA is for surveillance purposes and, if positive, to facilitate isolation considerations in high risk settings. It is not intended for automatic decolonization interventions per se as regimens are not sufficiently effective to warrant routine use.        Radiology:     Medications       Current Facility-Administered Medications   Medication Dose Route Frequency Last Admin    piperacillin-tazobactam (ZOSYN) 3.375 g in 0.9% sodium chloride (MBP/ADV) 100 mL MBP  3.375 g IntraVENous Q8H 3.375 g at 02/10/22 1423    enoxaparin (LOVENOX) injection 80 mg  80 mg SubCUTAneous ONCE      oxyCODONE IR (ROXICODONE) tablet 5 mg  5 mg Oral Q4H PRN      morphine injection 2 mg  2 mg IntraVENous Q3H PRN      dextrose 5% - 0.45% NaCl with KCl 20 mEq/L infusion  100 mL/hr IntraVENous CONTINUOUS 100 mL/hr at 02/08/22 2002    acetaminophen (TYLENOL) suppository 650 mg  650 mg Rectal Q6H  mg at 02/06/22 0117    0.9% sodium chloride infusion 250 mL  250 mL IntraVENous PRN      LORazepam (ATIVAN) injection 0.5 mg  0.5 mg IntraVENous Q12H PRN 0.5 mg at 02/06/22 0117    pantoprazole (PROTONIX) 40 mg in 0.9% sodium chloride 10 mL injection  40 mg IntraVENous Q12H 40 mg at 02/10/22 4894    fluconazole (DIFLUCAN) 400mg/200 mL IVPB (premix)  400 mg IntraVENous Q24H 400 mg at 02/09/22 2008           Case discussed with: Orthopedic surgery, general surgery      Aurora Sylvester DO

## 2022-02-10 NOTE — PROGRESS NOTES
[id number] Perera MD [facility] office that the patient had couple of critcal alb values such as the potassium 2.4, calcium 5.3 and hemoglobin 5.7.   0706 : Left a message on [employer] to call back .

## 2022-02-10 NOTE — PROGRESS NOTES
General Surgery Daily Progress Note    Patient: Jeremy Cross MRN: [id number]  SSN: [id number]    YOB: 1955  Age: 75 y.o. Sex: female      Admit Date: 2/4/2022    POD 6 Days Post-Op    Procedure: Procedure(s):  LAPAROSCOPY GENERAL DIAGNOSTIC, CONVERTED TO OPEN, LYSIS OF ADHESIONS, OVERSEWED PERFORATED ULCER    Subjective:   Patient feeling well  Marielos diet  Had a CT this AM which showed pelvic collection, new R sided PEs, and collection on right hip  Her WBC went up to 15 today  Current Facility-Administered Medications   Medication Dose Route Frequency    piperacillin-tazobactam (ZOSYN) 3.375 g in 0.9% sodium chloride (MBP/ADV) 100 mL MBP  3.375 g IntraVENous Q8H    enoxaparin (LOVENOX) injection 80 mg  80 mg SubCUTAneous Q12H    potassium chloride 10 mEq in 100 ml IVPB  10 mEq IntraVENous Q1H    oxyCODONE IR (ROXICODONE) tablet 5 mg  5 mg Oral Q4H PRN    morphine injection 2 mg  2 mg IntraVENous Q3H PRN    dextrose 5% - 0.45% NaCl with KCl 20 mEq/L infusion  100 mL/hr IntraVENous CONTINUOUS    acetaminophen (TYLENOL) suppository 650 mg  650 mg Rectal Q6H PRN    0.9% sodium chloride infusion 250 mL  250 mL IntraVENous PRN    LORazepam (ATIVAN) injection 0.5 mg  0.5 mg IntraVENous Q12H PRN    pantoprazole (PROTONIX) 40 mg in 0.9% sodium chloride 10 mL injection  40 mg IntraVENous Q12H    fluconazole (DIFLUCAN) 400mg/200 mL IVPB (premix)  400 mg IntraVENous Q24H        Objective:   02/10 0701 - 02/10 1900  In: 1363.3 [I.V.:1363.3]  Out: 60 [Drains:60]  02/08 1901 - 02/10 0700  In: 1320 [P.O.:1320]  Out: 310 [Drains:310]  Patient Vitals for the past 8 hrs:   BP Temp Pulse Resp SpO2   02/10/22 0815 -- -- (!) 105 -- 91 %   02/10/22 0805 (!) 150/72 99.3 °F (37.4 °C) (!) 110 24 (!) 88 %       Physical Exam:  General: Alert, cooperative, NAD  Lungs: Unlabored  Abdomen: Soft, minimally TTP, incisions C/D/I. HERIBERTO serous  Extremities: Warm, moves all, no edema.   Right hip incision C/D/I, healing well, no erythema, appropriately TTP  Skin:  Warm and dry, no rash    Labs:   Recent Labs     02/10/22  0748   WBC 15.5*   HGB 7.7*   HCT 23.4*        Recent Labs     02/10/22  0748      K 3.4*   *   CO2 26   *   BUN 7   CREA 0.55   CA 7.9*       Assessment / Plan:     POD 6 Days Post-Op    Procedure: Procedure(s):  LAPAROSCOPY GENERAL DIAGNOSTIC, CONVERTED TO OPEN, LYSIS OF ADHESIONS, OVERSEWED PERFORATED ULCER  Communicated all findings with patient  Will have ortho weight in on fluid collection seen on right hip, but do not believe this is infected  Continue regular diet  Communicated findings of CT with ID, Dr. Honey Mota, and plan is to broaden abx again to Zosyn  Will start Lovenox therapeutic for PEs. She should have her last dose of Lovenox tonight around 11PM.  No more Lovenox to be given after this dose so that IR can drain the pelvic fluid collection  I did speak to Dr. Haley Horvath from radiology about draining the collection.   The patient had already eaten after her CT so we have to wait until tomorrow      Susan Gilbert MD

## 2022-02-10 NOTE — PROGRESS NOTES
Bedside and Verbal shift change report given to Milla Serna (oncoming nurse) by Kira Liu (offgoing nurse). Report included the following information SBAR, Kardex, Intake/Output, MAR, Recent Results and Med Rec Status.

## 2022-02-10 NOTE — PROGRESS NOTES
2/10/2022  Case Management Progress Note    12:40 PM  Patient is 75year old female admitted 2/4 with a gastric ulcer  Patient's RUR is 6% green/low risk for readmission  Covid test: negative 2/4  Chart reviewed--patient discussed at IDR rounds  Per chart review patient is not yet medically stable for discharge as hgb dropped as well as K. Per surgery note CT showed new right sided PE and collection on right hip, plus WBC up to 15 today. Plan has been for patient to return home with outpatient PT, but she has not been able to see therapy for a couple of days so we will continue to follow and assist once needs become more clear. Transition of Care Plan   1. Continue medical management/treatment  2. Plan for home unless otherwise indicated  3. Family to transport if appropriate  4. Follow up with PCP, specialties as indicated  5.  CM will continue to follow    ANNALISE Rivera

## 2022-02-11 LAB
ANION GAP SERPL CALC-SCNC: 4 MMOL/L (ref 5–15)
BASOPHILS # BLD: 0 K/UL (ref 0–0.1)
BASOPHILS # BLD: 0.1 K/UL (ref 0–0.1)
BASOPHILS NFR BLD: 0 % (ref 0–1)
BASOPHILS NFR BLD: 1 % (ref 0–1)
BUN SERPL-MCNC: 6 MG/DL (ref 6–20)
BUN/CREAT SERPL: 10 (ref 12–20)
CALCIUM SERPL-MCNC: 7.7 MG/DL (ref 8.5–10.1)
CHLORIDE SERPL-SCNC: 110 MMOL/L (ref 97–108)
CO2 SERPL-SCNC: 27 MMOL/L (ref 21–32)
COMMENT, HOLDF: NORMAL
CREAT SERPL-MCNC: 0.62 MG/DL (ref 0.55–1.02)
DAT POLY-SP REAG RBC QL: NORMAL
DIFFERENTIAL METHOD BLD: ABNORMAL
DIFFERENTIAL METHOD BLD: ABNORMAL
EOSINOPHIL # BLD: 0.1 K/UL (ref 0–0.4)
EOSINOPHIL # BLD: 0.3 K/UL (ref 0–0.4)
EOSINOPHIL NFR BLD: 1 % (ref 0–7)
EOSINOPHIL NFR BLD: 2 % (ref 0–7)
ERYTHROCYTE [DISTWIDTH] IN BLOOD BY AUTOMATED COUNT: 13 % (ref 11.5–14.5)
ERYTHROCYTE [DISTWIDTH] IN BLOOD BY AUTOMATED COUNT: 13.2 % (ref 11.5–14.5)
FERRITIN SERPL-MCNC: 144 NG/ML (ref 8–252)
FOLATE SERPL-MCNC: 23.9 NG/ML (ref 5–21)
GLUCOSE SERPL-MCNC: 110 MG/DL (ref 65–100)
HAPTOGLOB SERPL-MCNC: 400 MG/DL (ref 30–200)
HCT VFR BLD AUTO: 21 % (ref 35–47)
HCT VFR BLD AUTO: 24.4 % (ref 35–47)
HGB BLD-MCNC: 6.9 G/DL (ref 11.5–16)
HGB BLD-MCNC: 8.1 G/DL (ref 11.5–16)
IMM GRANULOCYTES # BLD AUTO: 0 K/UL
IMM GRANULOCYTES # BLD AUTO: 0.3 K/UL (ref 0–0.04)
IMM GRANULOCYTES NFR BLD AUTO: 0 %
IMM GRANULOCYTES NFR BLD AUTO: 2 % (ref 0–0.5)
IRON SATN MFR SERPL: 9 % (ref 20–50)
IRON SERPL-MCNC: 15 UG/DL (ref 35–150)
LDH SERPL L TO P-CCNC: 284 U/L (ref 81–246)
LYMPHOCYTES # BLD: 1.1 K/UL (ref 0.8–3.5)
LYMPHOCYTES # BLD: 2.2 K/UL (ref 0.8–3.5)
LYMPHOCYTES NFR BLD: 16 % (ref 12–49)
LYMPHOCYTES NFR BLD: 9 % (ref 12–49)
MAGNESIUM SERPL-MCNC: 1.9 MG/DL (ref 1.6–2.4)
MCH RBC QN AUTO: 30.3 PG (ref 26–34)
MCH RBC QN AUTO: 30.8 PG (ref 26–34)
MCHC RBC AUTO-ENTMCNC: 32.9 G/DL (ref 30–36.5)
MCHC RBC AUTO-ENTMCNC: 33.2 G/DL (ref 30–36.5)
MCV RBC AUTO: 92.1 FL (ref 80–99)
MCV RBC AUTO: 92.8 FL (ref 80–99)
METAMYELOCYTES NFR BLD MANUAL: 2 %
MONOCYTES # BLD: 0.7 K/UL (ref 0–1)
MONOCYTES # BLD: 1 K/UL (ref 0–1)
MONOCYTES NFR BLD: 5 % (ref 5–13)
MONOCYTES NFR BLD: 8 % (ref 5–13)
MYELOCYTES NFR BLD MANUAL: 1 %
NEUTS SEG # BLD: 10.3 K/UL (ref 1.8–8)
NEUTS SEG # BLD: 9.7 K/UL (ref 1.8–8)
NEUTS SEG NFR BLD: 76 % (ref 32–75)
NEUTS SEG NFR BLD: 77 % (ref 32–75)
NRBC # BLD: 0 K/UL (ref 0–0.01)
NRBC # BLD: 0 K/UL (ref 0–0.01)
NRBC BLD-RTO: 0 PER 100 WBC
NRBC BLD-RTO: 0 PER 100 WBC
PHOSPHATE SERPL-MCNC: 3.2 MG/DL (ref 2.6–4.7)
PLATELET # BLD AUTO: 360 K/UL (ref 150–400)
PLATELET # BLD AUTO: 418 K/UL (ref 150–400)
PMV BLD AUTO: 9.5 FL (ref 8.9–12.9)
PMV BLD AUTO: 9.9 FL (ref 8.9–12.9)
POTASSIUM SERPL-SCNC: 3.2 MMOL/L (ref 3.5–5.1)
RBC # BLD AUTO: 2.28 M/UL (ref 3.8–5.2)
RBC # BLD AUTO: 2.63 M/UL (ref 3.8–5.2)
RBC MORPH BLD: ABNORMAL
RETICS # AUTO: 0.06 M/UL (ref 0.02–0.08)
RETICS/RBC NFR AUTO: 2.4 % (ref 0.7–2.1)
SAMPLES BEING HELD,HOLD: NORMAL
SODIUM SERPL-SCNC: 141 MMOL/L (ref 136–145)
TIBC SERPL-MCNC: 159 UG/DL (ref 250–450)
VIT B12 SERPL-MCNC: 1195 PG/ML (ref 193–986)
WBC # BLD AUTO: 12.6 K/UL (ref 3.6–11)
WBC # BLD AUTO: 13.7 K/UL (ref 3.6–11)

## 2022-02-11 PROCEDURE — 82607 VITAMIN B-12: CPT

## 2022-02-11 PROCEDURE — C9113 INJ PANTOPRAZOLE SODIUM, VIA: HCPCS | Performed by: SURGERY

## 2022-02-11 PROCEDURE — 85025 COMPLETE CBC W/AUTO DIFF WBC: CPT

## 2022-02-11 PROCEDURE — 99223 1ST HOSP IP/OBS HIGH 75: CPT | Performed by: INTERNAL MEDICINE

## 2022-02-11 PROCEDURE — 99232 SBSQ HOSP IP/OBS MODERATE 35: CPT | Performed by: INTERNAL MEDICINE

## 2022-02-11 PROCEDURE — 74011000250 HC RX REV CODE- 250: Performed by: SURGERY

## 2022-02-11 PROCEDURE — 84100 ASSAY OF PHOSPHORUS: CPT

## 2022-02-11 PROCEDURE — 85045 AUTOMATED RETICULOCYTE COUNT: CPT

## 2022-02-11 PROCEDURE — 83010 ASSAY OF HAPTOGLOBIN QUANT: CPT

## 2022-02-11 PROCEDURE — 74011250636 HC RX REV CODE- 250/636: Performed by: NURSE PRACTITIONER

## 2022-02-11 PROCEDURE — 82746 ASSAY OF FOLIC ACID SERUM: CPT

## 2022-02-11 PROCEDURE — 83540 ASSAY OF IRON: CPT

## 2022-02-11 PROCEDURE — 36415 COLL VENOUS BLD VENIPUNCTURE: CPT

## 2022-02-11 PROCEDURE — 83615 LACTATE (LD) (LDH) ENZYME: CPT

## 2022-02-11 PROCEDURE — 74011250636 HC RX REV CODE- 250/636: Performed by: SURGERY

## 2022-02-11 PROCEDURE — 65270000029 HC RM PRIVATE

## 2022-02-11 PROCEDURE — 82728 ASSAY OF FERRITIN: CPT

## 2022-02-11 PROCEDURE — 86880 COOMBS TEST DIRECT: CPT

## 2022-02-11 PROCEDURE — 83735 ASSAY OF MAGNESIUM: CPT

## 2022-02-11 PROCEDURE — 74011250636 HC RX REV CODE- 250/636: Performed by: INTERNAL MEDICINE

## 2022-02-11 PROCEDURE — 80048 BASIC METABOLIC PNL TOTAL CA: CPT

## 2022-02-11 PROCEDURE — 74011000258 HC RX REV CODE- 258: Performed by: INTERNAL MEDICINE

## 2022-02-11 RX ORDER — POTASSIUM CHLORIDE 7.45 MG/ML
10 INJECTION INTRAVENOUS
Status: COMPLETED | OUTPATIENT
Start: 2022-02-11 | End: 2022-02-11

## 2022-02-11 RX ORDER — ENOXAPARIN SODIUM 100 MG/ML
80 INJECTION SUBCUTANEOUS EVERY 12 HOURS
Status: DISCONTINUED | OUTPATIENT
Start: 2022-02-11 | End: 2022-02-14

## 2022-02-11 RX ADMIN — FLUCONAZOLE 400 MG: 2 INJECTION, SOLUTION INTRAVENOUS at 21:14

## 2022-02-11 RX ADMIN — PIPERACILLIN AND TAZOBACTAM 3.38 G: 3; .375 INJECTION, POWDER, LYOPHILIZED, FOR SOLUTION INTRAVENOUS at 13:35

## 2022-02-11 RX ADMIN — ERTAPENEM SODIUM 1 G: 1 INJECTION, POWDER, LYOPHILIZED, FOR SOLUTION INTRAMUSCULAR; INTRAVENOUS at 15:48

## 2022-02-11 RX ADMIN — ENOXAPARIN SODIUM 80 MG: 100 INJECTION SUBCUTANEOUS at 23:38

## 2022-02-11 RX ADMIN — POTASSIUM CHLORIDE 10 MEQ: 7.46 INJECTION, SOLUTION INTRAVENOUS at 11:26

## 2022-02-11 RX ADMIN — POTASSIUM CHLORIDE 10 MEQ: 7.46 INJECTION, SOLUTION INTRAVENOUS at 10:19

## 2022-02-11 RX ADMIN — PIPERACILLIN AND TAZOBACTAM 3.38 G: 3; .375 INJECTION, POWDER, LYOPHILIZED, FOR SOLUTION INTRAVENOUS at 05:35

## 2022-02-11 RX ADMIN — SODIUM CHLORIDE, PRESERVATIVE FREE 40 MG: 5 INJECTION INTRAVENOUS at 21:14

## 2022-02-11 RX ADMIN — POTASSIUM CHLORIDE 10 MEQ: 7.46 INJECTION, SOLUTION INTRAVENOUS at 08:58

## 2022-02-11 RX ADMIN — SODIUM CHLORIDE, PRESERVATIVE FREE 40 MG: 5 INJECTION INTRAVENOUS at 08:58

## 2022-02-11 RX ADMIN — ENOXAPARIN SODIUM 80 MG: 100 INJECTION SUBCUTANEOUS at 13:36

## 2022-02-11 NOTE — PROGRESS NOTES
2/11/2022  Case Management Progress Note    1:28 PM  Patient is 75year old female admitted 2/4 with a gastric ulcer  Patient's RUR is 7% green/low risk for readmission  Covid test: negative 2/4  Chart reviewed--patient discussed at IDR rounds  Per chart patient is having drainage of a pelvic abscess today, WBC is still up. She will likely be with us another day or tow. Patient does not have any noted discharge needs at this point, plan remains for her to return home with family when medically ready to do so. Will continue to follow and assist.     Transition of Care Plan   1. Continue medical management/treatment  2. Home with family when ready unless otherwise indicated  3. Family transport as appropriate  4. Follow up with PCP, specialties as needed  5.  CM will continue to follow    ANNALISE Woodson

## 2022-02-11 NOTE — PROGRESS NOTES
Radiology team:  Patient has lovenox q 12 hours, for treatment of 2 PEs, per Dr Vivi Gerard, we are not able to do pelvic abscess drainage, HELIO Landin notified    Micki Jones RN

## 2022-02-11 NOTE — PROGRESS NOTES
Bedside and Verbal shift change report given to HELIO Gil (oncoming nurse) by Jn Haskins (offgoing nurse). Report included the following information SBAR, Kardex, Intake/Output, MAR, Recent Results and Med Rec Status.

## 2022-02-11 NOTE — PROGRESS NOTES
Plan for weekend is to continue therapeutic Lovenox for PEs    Waiting on note from Heme consult for anemia and for anticoagulation recs for outpatient and length of time    Continue abx per ID.   They switched to Ertapenem    Plan for CT Monday to re-evaluate pelvic collection    Patient should continue PT and mobilization    Continue BID PPI for perf ulcer and if no issues drain maybe removed over weekend

## 2022-02-11 NOTE — CONSULTS
ORTHOPEDIC CONSULTATION     SUBJECTIVE:     Gi Garcia is a 75 y.o. female  Who underwent RIGHT LAVERNE on 1/31/22. She was readmitted and underwent diagnostic laparoscopy converted to open lysis of adhesions and oversew repair and Alonna Le patch of a perforated gastric ulcer on 2/2/22 by Dr. Carmel Auguste. Ortho was consulted due to high WBC count and incidental anterior superficial fluid collection adjacent to the right hip. She reports that she is doing very well in regard to her right hip with no complaints. She has been exceedingly pleaed with her early post-op improvements compared to her pre-op status. Past Medical History :   Past Medical History:   Diagnosis Date    Anxiety     COVID-19 vaccine administered     boostered   [employer]    White coat syndrome without diagnosis of hypertension        Past Surgical History :   Past Surgical History:   Procedure Laterality Date    HX COLONOSCOPY  09/2021    HX DILATION AND CURETTAGE  1990    HX ORTHOPAEDIC          Medications :  See med reconciliation    Allergies :   Patient has no known allergies. Review of Systems:  (bold if positive, if negative)    Gen:  Eyes:  ENT:  CVS:  Pulm:  GI:  :  MS:  Skin:  Psych:  Endo:  Hem:  Renal:  Neuro:         FAMILY HISTORY :  Family History   Problem Relation Age of Onset    Clotting Disorder Neg Hx        SOCIAL :  Occupation - retired teacher         Objective     Vitals:  Patient Vitals for the past 12 hrs:   BP Temp Pulse Resp SpO2   02/11/22 0725 (!) 142/66 98.7 °F (37.1 °C) (!) 107 18 90 %   02/11/22 0136 (!) 142/69 -- (!) 102 18 91 %       Eyes with conjugate movement, PEARLA  Alert and Oriented x 4, Dementia is not present.    No Acute Respiratory Distress  Heart and Lung exam normal    Focused Physical Exam :   Right hip incision healing well, no apparent fluid collection/seroma  No Lymphadenopathy   Palpable pulses  No skin trophic changes    Labs :   Recent Labs     02/11/22  0835 02/11/22  0430 02/11/22  0430   HGB 8.1*   < > 6.9*   HCT 24.4*   < > 21.0*   NA  --   --  141   K  --   --  3.2*   CL  --   --  110*   CO2  --   --  27   BUN  --   --  6   CREA  --   --  0.62   GLU  --   --  110*    < > = values in this interval not displayed. CT :     EXAM: CTA CHEST W OR W WO CONT, CT ABD PELV W CONT     INDICATION: Recent perforated ulcer with oversewing and lysis of adhesions. Tachycardia, concern for pulmonary embolism. Leukocytosis, concern for abdominal  abscess. eval for PE     COMPARISON: 2/4/2022 CT of the abdomen.     CONTRAST: 100 mL of Isovue-370.     TECHNIQUE:   Precontrast  images were obtained to localize the volume for acquisition. Multislice helical CT arteriography was performed from the diaphragm to the  thoracic inlet during uneventful rapid bolus intravenous contrast  administration. Then contiguous slices were obtained through the abdomen and  pelvis. Lung and soft tissue windows were generated. Coronal and sagittal  images were generated and 3D post processing consisting of coronal maximum  intensity images was performed. CT dose reduction was achieved through use of a  standardized protocol tailored for this examination and automatic exposure  control for dose modulation.     FINDINGS:     There is a left thyroid low density 3.2 x 3.2 cm nodule with peripheral  calcification.     Bilateral pleural effusions, larger on the left. Attendant airspace disease with air bronchograms on the left.     The pulmonary arteries are well enhanced and no pulmonary emboli are identified.        Mild coronary artery calcification.     There is no mediastinal or hilar adenopathy or mass. The aorta enhances normally  without evidence of aneurysm or dissection.        LIVER: No mass or biliary dilatation. GALLBLADDER: Unremarkable. SPLEEN: No mass. PANCREAS: No mass or ductal dilatation. ADRENALS: Unremarkable. KIDNEYS: No mass, calculus, or hydronephrosis.   STOMACH: Unremarkable. SMALL BOWEL: No dilatation or wall thickening. COLON: Multiple sigmoid diverticula. .  APPENDIX: Normal on axial image 62  PERITONEUM: Small amount of pneumoperitoneum in a patient status post recent  surgery. One drain extends into the right lower quadrant. Complex fluid in the  cul-de-sac has a density measurement of 20.7 HU and measures up to 5.2 cm  (series 4, image 73). RETROPERITONEUM: No lymphadenopathy or aortic aneurysm. REPRODUCTIVE ORGANS: Unremarkable  URINARY BLADDER: No mass or calculus. BONES: No destructive bone lesion. Right total hip replacement present. ADDITIONAL COMMENTS: N/A     IMPRESSION     No evidence for pulmonary embolism  Left basilar pneumonia favored over subsegmental atelectasis  Complex fluid collection in the cul-de-sac could represent a developing abscess. This is not amenable to percutaneous drainage. Complex left thyroid nodule, for which ultrasound characterization is  recommended. Incidental sigmoid diverticulosis     ASSESSMENT :   Right hip benign - doing well. Recommendations : No recommendations from ortho perspective. She has a follow up appointment in our office on Wednesday 2/16/21. Thank you for allowing to participate in this patients care. Please do not hesitate to contact my office or page me with any questions or concerns.               Chavo Mensah MD  Cell [phone]  Afshan Hernandez PA-C  Cell [phone]  Medical Staff : Gema Jarrett  Office : [phone]

## 2022-02-11 NOTE — PROGRESS NOTES
Physical Therapy Note:    PT treatment deferred. Pt with acute PE, Lovenox held for procedure today and re-initiated 13:36 per chart. Will continue to follow per POC and resume PT interventions when appropriate.     Abdoul Summers, PT, DPT, Rita Ricketts

## 2022-02-11 NOTE — CONSULTS
[facility] [place] at [address], [address] [address] [facility] [phone]  F: [phone] Patient ID  Name: Jeremy Cross  YOB: 1955  MRN: [id number]  Referring Provider:   No referring provider defined for this encounter. Primary Care Provider:   Matilde Esteban MD       HEMATOLOGY/MEDICAL ONCOLOGY  NOTE   Date of Visit: 02/11/22  Reason for Evaluation:     Chief Complaint   Patient presents with    Abdominal Pain    Constipation       Subjective:     History of Present Illness:     Rasheed Morrison Mom is a 75 y.o. F who presents as an inpatient consultation for anemia and . Eric Gutter Patient reports feeling well overall. Patient reports adequate oral intake of food and hydration. Patient denies any bowel or bladder problems. Patient denies any uncontrolled pain. Patient denies any shortness of breath.  -denies any fevers,chills.  -had gastric perforation; now being treated for possible abscesses. -denies any hip pain issues. -denies any known prior anemia. -on anticoagulation for thrombosis  -denies any know prior DVT or PE. Denies family history.         Past Medical History:   Diagnosis Date    Anxiety     COVID-19 vaccine administered     boostered   [employer]    White coat syndrome without diagnosis of hypertension       Past Surgical History:   Procedure Laterality Date    HX COLONOSCOPY  09/2021    HX DILATION AND CURETTAGE  1990    HX ORTHOPAEDIC        Social History     Tobacco Use    Smoking status: Never Smoker    Smokeless tobacco: Never Used   Substance Use Topics    Alcohol use: Yes     Comment: social      Family History   Problem Relation Age of Onset    Clotting Disorder Neg Hx      Current Facility-Administered Medications   Medication Dose Route Frequency    enoxaparin (LOVENOX) injection 80 mg  80 mg SubCUTAneous Q12H    ertapenem (INVANZ) 1 g in 0.9% sodium chloride (MBP/ADV) 50 mL MBP  1 g IntraVENous DAILY    oxyCODONE IR (ROXICODONE) tablet 5 mg  5 mg Oral Q4H PRN    morphine injection 2 mg  2 mg IntraVENous Q3H PRN    dextrose 5% - 0.45% NaCl with KCl 20 mEq/L infusion  100 mL/hr IntraVENous CONTINUOUS    acetaminophen (TYLENOL) suppository 650 mg  650 mg Rectal Q6H PRN    0.9% sodium chloride infusion 250 mL  250 mL IntraVENous PRN    LORazepam (ATIVAN) injection 0.5 mg  0.5 mg IntraVENous Q12H PRN    pantoprazole (PROTONIX) 40 mg in 0.9% sodium chloride 10 mL injection  40 mg IntraVENous Q12H    fluconazole (DIFLUCAN) 400mg/200 mL IVPB (premix)  400 mg IntraVENous Q24H      No Known Allergies     Review of Systems provided by:patient  General: denies fever and denies fatigue  Eyes: denies any acute vision loss and denies any eye pain  HEENT: denies epistaxis and denies trouble swallowing  Cardio: denies any chest pain and denies any significant leg swelling  Resp: denies any shortness of breath and denies any hemoptysis  Abdomen: denies any abdominal pain, denies any nausea, denies any vomiting and denies any diarrhea Patient denies any hematochezia. and Patient denies any melena. MSK: denies any myalgias and denies any severe hip pain. Skin: denies any rash and denies any itching  Lymph: denies any lymph node enlargement and denies any lymph node tenderness  Neuro: denies any headache and denies any tremor  Psych: denies depression and denies anxiety      Objective:     Visit Vitals  BP (!) 158/70 (BP 1 Location: Right upper arm, BP Patient Position: Sitting) Comment: primary rn notified   Pulse 99   Temp 98.8 °F (37.1 °C)   Resp 18   Ht 5' 6\" (1.676 m)   Wt 190 lb 0.6 oz (86.2 kg)   SpO2 95%   BMI 30.67 kg/m²     ECOG PS: 3- Capable of only limited selfcare; confined to bed or chair more than 50% of waking hours. Physical Exam  Constitutional: No acute distress. , Non-toxic appearance. and Non-diaphoretic. HENT: Normocephalic and atraumatic head. Eyes: Normal Conjunctivae.  and Anicteric sclerae. Cardiovascular: S1,S2 auscultated., No pitting edema., Trace edema present. and No friction rub. Pulmonary: Normal Respiratory Effort., No wheezing., No rhonchi. and No rales. Abdominal: Normal bowel sounds. , Soft Abdomen to palpation. and No abdominal tenderness. Skin: No jaundice. and No rash. Musculoskeletal: No muscle pain on palpation. No temporal muscle wasting on inspection. Neurological: Alert and oriented. and No tremor on inspection. Psychiatric: mood normal. normal speech rate and normal affect      Results:     Lab Results   Component Value Date/Time    WBC 13.7 (H) 02/11/2022 08:35 AM    HGB 8.1 (L) 02/11/2022 08:35 AM    HCT 24.4 (L) 02/11/2022 08:35 AM    PLATELET 269 (H) 62/96/2153 08:35 AM    MCV 92.8 02/11/2022 08:35 AM    ABS. NEUTROPHILS 10.3 (H) 02/11/2022 08:35 AM     Lab Results   Component Value Date/Time    Sodium 141 02/11/2022 04:30 AM    Potassium 3.2 (L) 02/11/2022 04:30 AM    Chloride 110 (H) 02/11/2022 04:30 AM    CO2 27 02/11/2022 04:30 AM    Glucose 110 (H) 02/11/2022 04:30 AM    BUN 6 02/11/2022 04:30 AM    Creatinine 0.62 02/11/2022 04:30 AM    GFR est AA >60 02/11/2022 04:30 AM    GFR est non-AA >60 02/11/2022 04:30 AM    Calcium 7.7 (L) 02/11/2022 04:30 AM     Lab Results   Component Value Date/Time    Bilirubin, total 0.3 02/04/2022 10:55 AM    ALT (SGPT) 27 02/04/2022 10:55 AM    Alk. phosphatase 72 02/04/2022 10:55 AM    Protein, total 6.7 02/04/2022 10:55 AM    Albumin 2.5 (L) 02/04/2022 10:55 AM    Globulin 4.2 (H) 02/04/2022 10:55 AM       XR PELV AP ONLY    Result Date: 1/31/2022  Pelvis 2 views dated 1/31/2022 COMPARISON: None. History is status post surgery A single frontal view of the pelvis and the proximal right femur were obtained. There has been recent placement of a hip prosthesis. Recent placement of a right hip prosthesis. XR ABD FLAT/ ERECT    Result Date: 2/4/2022  EXAM: XR ABD FLAT/ ERECT INDICATION: ? Constipation COMPARISON: None. TECHNIQUE: AP supine and erect abdomen, 2 views FINDINGS: Nondilated, nonobstructive bowel gas pattern. Moderate colorectal stool burden. No upright evidence of subdiaphragmatic free air. Partially visualized right total hip arthroplasty. Soft tissue gas overlying the right hip, likely postoperative. No evidence of ileus or obstruction. Constipation. CT CHEST ABD PELV W CONT    Result Date: 2/10/2022  EXAM: CT CHEST ABD PELV W CONT INDICATION: eval pneumonia and abdominal abscess progression or resolution COMPARISON: CT 2/7/2022. IV CONTRAST: 100 mL of Isovue-370. ORAL CONTRAST: None. TECHNIQUE: Following the uneventful intravenous administration of contrast, thin axial images were obtained through the chest, abdomen and pelvis. Coronal and sagittal reformats were generated. CT dose reduction was achieved through use of a standardized protocol tailored for this examination and automatic exposure control for dose modulation. FINDINGS: CHEST WALL: No mass or axillary lymphadenopathy. THYROID: Redemonstration of heterogeneous 3.2 x 3.3 cm left thyroid nodule with punctate calcification MEDIASTINUM: No mass or lymphadenopathy. JONNATHAN: No mass or lymphadenopathy. THORACIC AORTA: Atherosclerotic calcification without aneurysm or dissection. PULMONARY ARTERIES: Hypoechoic filling defect in right upper and lower lobe pulmonary arteries. TRACHEA/BRONCHI: Patent. ESOPHAGUS: No wall thickening or dilatation. HEART: Normal in size. PLEURA: Left greater than right pleural effusions, increased from prior. No pneumothorax. LUNGS: Compressive atelectasis overlying pleural effusions and lower lobes bilaterally. Aerated lungs are clear with no nodule, mass, airspace infiltrate, or edema. LIVER: No mass. BILIARY TREE: Gallbladder is within normal limits. CBD is not dilated.  SPLEEN: There is persistent perisplenic fluid along the lateral and posterior margin of the spleen with slight underlying splenic contour abnormal a suggesting subcapsular location. No focal spleen lesion. PANCREAS: No mass or ductal dilatation. ADRENALS: Unremarkable. KIDNEYS: No mass, calculus, or hydronephrosis. STOMACH: Unremarkable. SMALL BOWEL: No dilatation or wall thickening. COLON: Noninflamed appearing sigmoid diverticula. No dilation or wall thickening. APPENDIX: Opacified with contrast. Otherwise unremarkable. PERITONEUM: Surgical drain enters from the left abdomen and then traverses inferiorly and to the right upper pelvis. There is persistent complex fluid collection in the cul-de-sac in the pelvis measuring 3.3 x 6.3 cm with peripheral rim enhancement, previously 2.3 x 5.2 cm. There is decrease in small pneumoperitoneum along the anterior upper omentum to the right of midline. RETROPERITONEUM: Atherosclerotic calcification without aneurysm or dissection. No enlarged lymphadenopathy. REPRODUCTIVE ORGANS: Uterus and right ovary are unremarkable. There is no significant change in 3.4 cm cyst of left ovary. URINARY BLADDER: Partially obscured with no evident mass or calculus. BONES: Right hip arthroplasty prosthesis which generates streak artifact obscuring adjacent tissues. No acute fracture or aggressive lesion. Osteoarthritic changes of the left hip. Degenerative spine change. ABDOMINAL WALL: Bilateral flank edema with gas and fluid collection anterior to the right hip measuring 2.8 x 4.9 cm, previously 2.1 x 4.5 cm with caudal extent not included on field of view but craniocaudal extent greater than 8 cm. ADDITIONAL COMMENTS: N/A     1. Right upper and lower lobe pulmonary emboli. 2. Slight increase in size of rim-enhancing complex collection in the cul-de-sac of the pelvis concerning for abscess. 3. Gas and fluid containing collection in the soft tissues anterior to the right hip concerning for abscess. 4. Increased left greater than right pleural effusions. 5. Additional findings as above.  The findings were called to patient's nurse, Katerin Babin, on 2/10/2022 at 10:54 by myself. [address] [facility] W WO CONT    Result Date: 2/7/2022  EXAM: CTA CHEST W OR W WO CONT, CT ABD PELV W CONT INDICATION: Recent perforated ulcer with oversewing and lysis of adhesions. Tachycardia, concern for pulmonary embolism. Leukocytosis, concern for abdominal abscess. eval for PE COMPARISON: 2/4/2022 CT of the abdomen. CONTRAST: 100 mL of Isovue-370. TECHNIQUE: Precontrast  images were obtained to localize the volume for acquisition. Multislice helical CT arteriography was performed from the diaphragm to the thoracic inlet during uneventful rapid bolus intravenous contrast administration. Then contiguous slices were obtained through the abdomen and pelvis. Lung and soft tissue windows were generated. Coronal and sagittal images were generated and 3D post processing consisting of coronal maximum intensity images was performed. CT dose reduction was achieved through use of a standardized protocol tailored for this examination and automatic exposure control for dose modulation. FINDINGS: There is a left thyroid low density 3.2 x 3.2 cm nodule with peripheral calcification. Bilateral pleural effusions, larger on the left. Attendant airspace disease with air bronchograms on the left. The pulmonary arteries are well enhanced and no pulmonary emboli are identified. Mild coronary artery calcification. There is no mediastinal or hilar adenopathy or mass. The aorta enhances normally without evidence of aneurysm or dissection. LIVER: No mass or biliary dilatation. GALLBLADDER: Unremarkable. SPLEEN: No mass. PANCREAS: No mass or ductal dilatation. ADRENALS: Unremarkable. KIDNEYS: No mass, calculus, or hydronephrosis. STOMACH: Unremarkable. SMALL BOWEL: No dilatation or wall thickening. COLON: Multiple sigmoid diverticula. . APPENDIX: Normal on axial image 62 PERITONEUM: Small amount of pneumoperitoneum in a patient status post recent surgery.  One drain extends into the right lower quadrant. Complex fluid in the cul-de-sac has a density measurement of 20.7 HU and measures up to 5.2 cm (series 4, image 73). RETROPERITONEUM: No lymphadenopathy or aortic aneurysm. REPRODUCTIVE ORGANS: Unremarkable URINARY BLADDER: No mass or calculus. BONES: No destructive bone lesion. Right total hip replacement present. ADDITIONAL COMMENTS: N/A     No evidence for pulmonary embolism Left basilar pneumonia favored over subsegmental atelectasis Complex fluid collection in the cul-de-sac could represent a developing abscess. This is not amenable to percutaneous drainage. Complex left thyroid nodule, for which ultrasound characterization is recommended. Incidental sigmoid diverticulosis    CT LOW EXT RT WO CONT    Result Date: 1/3/2022  INTERPRETATION PROVIDED FOR COMPLIANCE ONLY AT NO CHARGE The CT scan was ordered for presurgical planning without specific directions asking for radiologic interpretation. Technically adequate for presurgical planning. Severe osteoarthritis of the right hip. Mild to moderate osteoarthritis of the left hip. Spondylosis in the lower lumbar spine. Degenerative bridging of the left sacroiliac joint. Degenerative bridging of the superior aspect of the pubic symphysis. Next There is a 3.1 cm left ovarian cyst. Multiple colonic diverticula are present. . No evidence of an aggressive osseous lesion. CT ABD PELV W CONT    Result Date: 2/7/2022  EXAM: CTA CHEST W OR W WO CONT, CT ABD PELV W CONT INDICATION: Recent perforated ulcer with oversewing and lysis of adhesions. Tachycardia, concern for pulmonary embolism. Leukocytosis, concern for abdominal abscess. eval for PE COMPARISON: 2/4/2022 CT of the abdomen. CONTRAST: 100 mL of Isovue-370. TECHNIQUE: Precontrast  images were obtained to localize the volume for acquisition. Multislice helical CT arteriography was performed from the diaphragm to the thoracic inlet during uneventful rapid bolus intravenous contrast administration.  Then contiguous slices were obtained through the abdomen and pelvis. Lung and soft tissue windows were generated. Coronal and sagittal images were generated and 3D post processing consisting of coronal maximum intensity images was performed. CT dose reduction was achieved through use of a standardized protocol tailored for this examination and automatic exposure control for dose modulation. FINDINGS: There is a left thyroid low density 3.2 x 3.2 cm nodule with peripheral calcification. Bilateral pleural effusions, larger on the left. Attendant airspace disease with air bronchograms on the left. The pulmonary arteries are well enhanced and no pulmonary emboli are identified. Mild coronary artery calcification. There is no mediastinal or hilar adenopathy or mass. The aorta enhances normally without evidence of aneurysm or dissection. LIVER: No mass or biliary dilatation. GALLBLADDER: Unremarkable. SPLEEN: No mass. PANCREAS: No mass or ductal dilatation. ADRENALS: Unremarkable. KIDNEYS: No mass, calculus, or hydronephrosis. STOMACH: Unremarkable. SMALL BOWEL: No dilatation or wall thickening. COLON: Multiple sigmoid diverticula. . APPENDIX: Normal on axial image 62 PERITONEUM: Small amount of pneumoperitoneum in a patient status post recent surgery. One drain extends into the right lower quadrant. Complex fluid in the cul-de-sac has a density measurement of 20.7 HU and measures up to 5.2 cm (series 4, image 73). RETROPERITONEUM: No lymphadenopathy or aortic aneurysm. REPRODUCTIVE ORGANS: Unremarkable URINARY BLADDER: No mass or calculus. BONES: No destructive bone lesion. Right total hip replacement present. ADDITIONAL COMMENTS: N/A     No evidence for pulmonary embolism Left basilar pneumonia favored over subsegmental atelectasis Complex fluid collection in the cul-de-sac could represent a developing abscess. This is not amenable to percutaneous drainage.  Complex left thyroid nodule, for which ultrasound characterization is recommended. Incidental sigmoid diverticulosis    CT ABD PELV W CONT    Result Date: 2/4/2022  EXAM: CT ABD PELV W CONT INDICATION: Abdominal pain and constipation COMPARISON: None CONTRAST: 100 mL of Isovue-370. ORAL CONTRAST: None TECHNIQUE: Following the uneventful intravenous administration of contrast, thin axial images were obtained through the abdomen and pelvis. Coronal and sagittal reconstructions were generated. CT dose reduction was achieved through use of a standardized protocol tailored for this examination and automatic exposure control for dose modulation. FINDINGS: LOWER THORAX: No significant abnormality in the incidentally imaged lower chest. LIVER: No mass. BILIARY TREE: Gallbladder is within normal limits. CBD is not dilated. SPLEEN: within normal limits. PANCREAS: No mass or ductal dilatation. ADRENALS: Unremarkable. KIDNEYS: 17 mm left parapelvic cyst, no follow-up required. No renal mass or hydronephrosis. STOMACH: Antral wall thickening is noted with associated inflammatory change. There is evidence of a defect in the lesser curvature compatible with perforated ulcer. SMALL BOWEL: No dilatation or wall thickening. COLON: No dilatation or wall thickening. APPENDIX: Within normal limits. PERITONEUM: Significant free intraperitoneal air and mild free fluid. RETROPERITONEUM: No lymphadenopathy or aortic aneurysm. REPRODUCTIVE ORGANS: There is a 3.4 cm left ovarian cyst. URINARY BLADDER: No mass or calculus. BONES: The patient is status post right total hip replacement. Postoperative changes are seen in the right hip soft tissues. Degenerative changes are seen in the lumbar spine. ABDOMINAL WALL: No mass or hernia. ADDITIONAL COMMENTS: N/A     Findings compatible with gastric perforation, with free intraperitoneal air and fluid. The findings were called to Bradley Moore on 2/4/2022 at 2:11 PM by myself. [phone]    Assessment and Recommendations:      Active Hospital Problems    Diagnosis Date Noted    Gastric ulcer 02/04/2022     Diagnoses and all orders for this visit:    1. Normocytic Anemia   -wide differential; orders placed for lab work-up for anemia.  -could be multifactorial but GI Perforation could have been a culprit. Also, patient with possible blood loss. Difficult scenario  -moderate anemia but had normal hgb on admission.  -keep Hgb greater than 7g/dL with RBC transfusion. 2. Pulmonary Emboli  -continue forward with anticoagulation for at least 3 months unless otherwise contraindicated. -continually address risk/benefit ratio of anticoagulation vs. Bleeding risk. 3. Gastric perforation (HCC)  -being treated for abscesses. cautiosly continue anticoagulation but carefully monitor hemoglobin. Follow-Up:  Will follow-up this weekend. Please page if specific questions.     Signed By:   Chuy Nelson MD

## 2022-02-11 NOTE — PROGRESS NOTES
[facility] Progress Note           Jonny Gilmore DO    [phone] Office  [phone]  Fax    2022      Assessment & Plan:   · Suspected pelvic abscess. IR unable to drain. Tentatively plan for repeat CT on Monday. If IR is unable to drain pelvic collection plan discharge on ertapenem x4 weeks. We will start ertapenem today to make sure that she improves on this antibiotic prior to discharge. Continue fluconazole. Discussed with pharmacy  · Right hip fluid collection  Thought to be expected postoperative changes per orthopedic surgery. No intervention planned   · Leukocytosis. Down slightly today. Antibiotics adjusted and plans for drainage of pelvic collection as above. Follow trend   · Pulmonary embolus. May be contributing to leukocytosis. Anticoagulation per team  · LLL airspace disease. Patient asymptomatic covered for possible aspiration on current antibiotics  ·   Perforated gastric ulcer status post open repair with [place] Germaine patch 2022  · Recent right TKA 2022  · Anemia. Work-up by primary team          Subjective:     WBC down slightly patient feeling better overall    Objective:     Vitals:   Visit Vitals  BP (!) 140/74 (BP 1 Location: Left upper arm, BP Patient Position: Sitting)   Pulse 93   Temp 98.2 °F (36.8 °C)   Resp 18   Ht 5' 6\" (1.676 m)   Wt 190 lb 0.6 oz (86.2 kg)   SpO2 96%   BMI 30.67 kg/m²        Tmax:  Temp (24hrs), Av.5 °F (36.9 °C), Min:98.2 °F (36.8 °C), Max:98.7 °F (37.1 °C)      Exam:   Patient is intubated:  no    Physical Examination:   General:  Alert, cooperative, no distress   Head:  Normocephalic, atraumatic. Eyes:  Conjunctivae clear   Neck: Supple       Lungs:   No distress. Chest wall:     Heart:     Abdomen:    Nondistended   Extremities: Moves all. Skin:  No rash   Neurologic: CNII-XII intact.  Normal strength     Labs:        No lab exists for component: ITNL   No results for input(s): CPK, CKMB, TROIQ in the last 72 hours. Recent Labs     02/11/22  0956 02/11/22  0835 02/11/22  0430 02/10/22  0748 02/10/22  0608 02/10/22  0608   NA  --   --  141 142  --  147*   K  --   --  3.2* 3.4*  --  2.4*   CL  --   --  110* 110*  --  124*   CO2  --   --  27 26  --  16*   BUN  --   --  6 7  --  5*   CREA  --   --  0.62 0.55  --  0.27*   GLU  --   --  110* 107*  --  102*   PHOS 3.2  --   --   --   --   --    MG 1.9  --   --   --   --   --    WBC  --  13.7* 12.6* 15.5*   < > 10.6   HGB  --  8.1* 6.9* 7.7*   < > 5.7*   HCT  --  24.4* 21.0* 23.4*   < > 18.0*   PLT  --  418* 360 351   < > 190    < > = values in this interval not displayed. No results for input(s): INR, PTP, APTT, INREXT, INREXT in the last 72 hours. Needs: urine analysis, urine sodium, protein and creatinine  No results found for: SHAYLEE, CREAU      Cultures:     No results found for: SDES  Lab Results   Component Value Date/Time    Culture result: NO GROWTH 5 DAYS 02/04/2022 02:55 PM    Culture result: MRSA NOT PRESENT 01/24/2022 11:13 AM    Culture result:  01/24/2022 11:13 AM     Screening of patient nares for MRSA is for surveillance purposes and, if positive, to facilitate isolation considerations in high risk settings. It is not intended for automatic decolonization interventions per se as regimens are not sufficiently effective to warrant routine use.        Radiology:     Medications       Current Facility-Administered Medications   Medication Dose Route Frequency Last Admin    enoxaparin (LOVENOX) injection 80 mg  80 mg SubCUTAneous Q12H 80 mg at 02/11/22 1336    piperacillin-tazobactam (ZOSYN) 3.375 g in 0.9% sodium chloride (MBP/ADV) 100 mL MBP  3.375 g IntraVENous Q8H 3.375 g at 02/11/22 1335    oxyCODONE IR (ROXICODONE) tablet 5 mg  5 mg Oral Q4H PRN      morphine injection 2 mg  2 mg IntraVENous Q3H PRN      dextrose 5% - 0.45% NaCl with KCl 20 mEq/L infusion  100 mL/hr IntraVENous CONTINUOUS 100 mL/hr at 02/10/22 0914    acetaminophen (TYLENOL) suppository 650 mg  650 mg Rectal Q6H  mg at 02/06/22 0117    0.9% sodium chloride infusion 250 mL  250 mL IntraVENous PRN      LORazepam (ATIVAN) injection 0.5 mg  0.5 mg IntraVENous Q12H PRN 0.5 mg at 02/06/22 0117    pantoprazole (PROTONIX) 40 mg in 0.9% sodium chloride 10 mL injection  40 mg IntraVENous Q12H 40 mg at 02/11/22 0858    fluconazole (DIFLUCAN) 400mg/200 mL IVPB (premix)  400 mg IntraVENous Q24H 400 mg at 02/10/22 2052           Case discussed with: General surgery      Jacobo Mendoza, DO

## 2022-02-11 NOTE — PROGRESS NOTES
Follow up visit on 5 Med Surg. Miss Ellie Nevarez and her  were in the room. She shared her frustrations with the unknowns at this point. Provided supportive presence as they shared a bit of their journey over the past couple of weeks. They are [employer] and their Joao Niarada has been by and provided prayer and communion. They appeared to be appreciative of the  visit. They have no specific spiritual needs at this time. Contact [facility] for any further referrals.   Per Villalta., MS., [address] ([id number])

## 2022-02-11 NOTE — PROGRESS NOTES
General Surgery Daily Progress Note    Patient: Eren Taversa MRN: [id number]  SSN: [id number]    YOB: 1955  Age: 75 y.o. Sex: female      Admit Date: 2/4/2022    POD 7 Days Post-Op    Procedure: Procedure(s):  LAPAROSCOPY GENERAL DIAGNOSTIC, CONVERTED TO OPEN, LYSIS OF ADHESIONS, OVERSEWED PERFORATED ULCER    Subjective:     Patient feeling well  Pain Stable  NPO overnight for possible IR pelvic abscess drainage  No n/v- +BMs and flatus  Ambulating frequently  Marielos diet  CBC drawn x2 due to clotting      Current Facility-Administered Medications   Medication Dose Route Frequency    potassium chloride 10 mEq in 100 ml IVPB  10 mEq IntraVENous Q1H    piperacillin-tazobactam (ZOSYN) 3.375 g in 0.9% sodium chloride (MBP/ADV) 100 mL MBP  3.375 g IntraVENous Q8H    oxyCODONE IR (ROXICODONE) tablet 5 mg  5 mg Oral Q4H PRN    morphine injection 2 mg  2 mg IntraVENous Q3H PRN    dextrose 5% - 0.45% NaCl with KCl 20 mEq/L infusion  100 mL/hr IntraVENous CONTINUOUS    acetaminophen (TYLENOL) suppository 650 mg  650 mg Rectal Q6H PRN    0.9% sodium chloride infusion 250 mL  250 mL IntraVENous PRN    LORazepam (ATIVAN) injection 0.5 mg  0.5 mg IntraVENous Q12H PRN    pantoprazole (PROTONIX) 40 mg in 0.9% sodium chloride 10 mL injection  40 mg IntraVENous Q12H    fluconazole (DIFLUCAN) 400mg/200 mL IVPB (premix)  400 mg IntraVENous Q24H        Objective:   No intake/output data recorded. 02/09 1901 - 02/11 0700  In: 2083.3 [P.O.:720; I.V.:1363.3]  Out: 290 [Drains:290]  Patient Vitals for the past 8 hrs:   BP Temp Pulse Resp SpO2   02/11/22 0725 (!) 142/66 98.7 °F (37.1 °C) (!) 107 18 90 %   02/11/22 0136 (!) 142/69 -- (!) 102 18 91 %       Physical Exam:  General: Alert, cooperative, NAD  Lungs: Unlabored  Abdomen: Soft, minimally TTP, incisions C/D/I. HERIBERTO serous  Extremities: Warm, moves all, no edema.   Right hip incision C/D/I, appropriately TTP  Skin:  Warm and dry, no rash    Labs:   Recent Labs     02/11/22  0835   WBC 13.7*   HGB 8.1*   HCT 24.4*   *     Recent Labs     02/11/22  0430      K 3.2*   *   CO2 27   *   BUN 6   CREA 0.62   CA 7.7*       Assessment / Plan:       Procedure: Procedure(s):  LAPAROSCOPY GENERAL DIAGNOSTIC, CONVERTED TO OPEN, LYSIS OF ADHESIONS, OVERSEWED PERFORATED ULCER     VSS except continued tachycardia  IV antibx changed yesterday per Dr Norman Lamas back to Zosyn/Fluc  Leukocytosis improving minimally  Hgb 1st draw 6.9- 2nd draw 8.1  K- 3.2 will replete  Mg/Phos pending    Keep NPO  Pending IR pelvic abscess drainage today    PE x 2:  Received therapeutic lovenox yesterday; last dose 11pm pending IR drainage today; will need to restart therapeutic dosing today post procedure  Will consult heme for help with anemia/anti-coagulation    Ortho:  Seen yesterday and CT most consistent with post-op changes  Will follow up with ortho as directed      Pt seen and discussed with Dr Cornelius Flowers, NP

## 2022-02-12 LAB
ANION GAP SERPL CALC-SCNC: 4 MMOL/L (ref 5–15)
BASOPHILS # BLD: 0 K/UL (ref 0–0.1)
BASOPHILS NFR BLD: 0 % (ref 0–1)
BUN SERPL-MCNC: 4 MG/DL (ref 6–20)
BUN/CREAT SERPL: 6 (ref 12–20)
CALCIUM SERPL-MCNC: 8.1 MG/DL (ref 8.5–10.1)
CHLORIDE SERPL-SCNC: 111 MMOL/L (ref 97–108)
CO2 SERPL-SCNC: 27 MMOL/L (ref 21–32)
COMMENT, HOLDF: NORMAL
CREAT SERPL-MCNC: 0.72 MG/DL (ref 0.55–1.02)
CRP SERPL-MCNC: 7.92 MG/DL (ref 0–0.6)
DIFFERENTIAL METHOD BLD: ABNORMAL
EOSINOPHIL # BLD: 0.1 K/UL (ref 0–0.4)
EOSINOPHIL NFR BLD: 1 % (ref 0–7)
ERYTHROCYTE [DISTWIDTH] IN BLOOD BY AUTOMATED COUNT: 13.2 % (ref 11.5–14.5)
GLUCOSE SERPL-MCNC: 115 MG/DL (ref 65–100)
HCT VFR BLD AUTO: 23.4 % (ref 35–47)
HGB BLD-MCNC: 7.8 G/DL (ref 11.5–16)
IMM GRANULOCYTES # BLD AUTO: 0.2 K/UL (ref 0–0.04)
IMM GRANULOCYTES NFR BLD AUTO: 2 % (ref 0–0.5)
LYMPHOCYTES # BLD: 1.5 K/UL (ref 0.8–3.5)
LYMPHOCYTES NFR BLD: 15 % (ref 12–49)
MCH RBC QN AUTO: 31 PG (ref 26–34)
MCHC RBC AUTO-ENTMCNC: 33.3 G/DL (ref 30–36.5)
MCV RBC AUTO: 92.9 FL (ref 80–99)
MONOCYTES # BLD: 0.6 K/UL (ref 0–1)
MONOCYTES NFR BLD: 6 % (ref 5–13)
NEUTS SEG # BLD: 7.7 K/UL (ref 1.8–8)
NEUTS SEG NFR BLD: 76 % (ref 32–75)
NRBC # BLD: 0 K/UL (ref 0–0.01)
NRBC BLD-RTO: 0 PER 100 WBC
PLATELET # BLD AUTO: 470 K/UL (ref 150–400)
PMV BLD AUTO: 9.7 FL (ref 8.9–12.9)
POTASSIUM SERPL-SCNC: 3.6 MMOL/L (ref 3.5–5.1)
RBC # BLD AUTO: 2.52 M/UL (ref 3.8–5.2)
SAMPLES BEING HELD,HOLD: NORMAL
SODIUM SERPL-SCNC: 142 MMOL/L (ref 136–145)
WBC # BLD AUTO: 10 K/UL (ref 3.6–11)

## 2022-02-12 PROCEDURE — 74011250636 HC RX REV CODE- 250/636: Performed by: INTERNAL MEDICINE

## 2022-02-12 PROCEDURE — 74011000258 HC RX REV CODE- 258: Performed by: INTERNAL MEDICINE

## 2022-02-12 PROCEDURE — 2709999900 HC NON-CHARGEABLE SUPPLY

## 2022-02-12 PROCEDURE — 74011250637 HC RX REV CODE- 250/637: Performed by: SURGERY

## 2022-02-12 PROCEDURE — 74011250636 HC RX REV CODE- 250/636: Performed by: SURGERY

## 2022-02-12 PROCEDURE — 80048 BASIC METABOLIC PNL TOTAL CA: CPT

## 2022-02-12 PROCEDURE — C9113 INJ PANTOPRAZOLE SODIUM, VIA: HCPCS | Performed by: SURGERY

## 2022-02-12 PROCEDURE — 36415 COLL VENOUS BLD VENIPUNCTURE: CPT

## 2022-02-12 PROCEDURE — 74011000250 HC RX REV CODE- 250: Performed by: SURGERY

## 2022-02-12 PROCEDURE — 65270000029 HC RM PRIVATE

## 2022-02-12 PROCEDURE — 74011250636 HC RX REV CODE- 250/636: Performed by: NURSE PRACTITIONER

## 2022-02-12 PROCEDURE — 85025 COMPLETE CBC W/AUTO DIFF WBC: CPT

## 2022-02-12 PROCEDURE — 86140 C-REACTIVE PROTEIN: CPT

## 2022-02-12 RX ORDER — ALPRAZOLAM 0.25 MG/1
0.25 TABLET ORAL
Status: DISCONTINUED | OUTPATIENT
Start: 2022-02-12 | End: 2022-02-17 | Stop reason: HOSPADM

## 2022-02-12 RX ORDER — FUROSEMIDE 10 MG/ML
20 INJECTION INTRAMUSCULAR; INTRAVENOUS ONCE
Status: COMPLETED | OUTPATIENT
Start: 2022-02-12 | End: 2022-02-12

## 2022-02-12 RX ADMIN — FLUCONAZOLE 400 MG: 2 INJECTION, SOLUTION INTRAVENOUS at 21:03

## 2022-02-12 RX ADMIN — ENOXAPARIN SODIUM 80 MG: 100 INJECTION SUBCUTANEOUS at 12:02

## 2022-02-12 RX ADMIN — ERTAPENEM SODIUM 1 G: 1 INJECTION, POWDER, LYOPHILIZED, FOR SOLUTION INTRAMUSCULAR; INTRAVENOUS at 09:39

## 2022-02-12 RX ADMIN — ALPRAZOLAM 0.25 MG: 0.25 TABLET ORAL at 11:56

## 2022-02-12 RX ADMIN — SODIUM CHLORIDE, PRESERVATIVE FREE 40 MG: 5 INJECTION INTRAVENOUS at 08:07

## 2022-02-12 RX ADMIN — POTASSIUM CHLORIDE, DEXTROSE MONOHYDRATE AND SODIUM CHLORIDE 100 ML/HR: 150; 5; 450 INJECTION, SOLUTION INTRAVENOUS at 08:07

## 2022-02-12 RX ADMIN — SODIUM CHLORIDE, PRESERVATIVE FREE 40 MG: 5 INJECTION INTRAVENOUS at 21:03

## 2022-02-12 RX ADMIN — FUROSEMIDE 20 MG: 10 INJECTION, SOLUTION INTRAMUSCULAR; INTRAVENOUS at 11:56

## 2022-02-12 NOTE — PROGRESS NOTES
Surgery    High BP this am.  Pt frequently has high BP at MD office, but checks regularly at home and SBP normally 125-135. Feeling quite anxious about everything that is going on. Denies abd pain or SOB  Visit Vitals  BP (!) 172/74 (BP 1 Location: Right upper arm, BP Patient Position: Sitting)   Pulse (!) 101   Temp 98.5 °F (36.9 °C)   Resp 18   Ht 5' 6\" (1.676 m)   Wt 86.2 kg (190 lb 0.6 oz)   SpO2 97%   BMI 30.67 kg/m²     Abd soft, NT    A/P:  Continue present care with anticoagulation and abx. Repeat CT on Monday. Will try xanax for anxiety and see if BP comes down  She is also amenable to BP med if needed.

## 2022-02-12 NOTE — PROGRESS NOTES
Bedside shift change report given to Maxim (oncoming nurse) by Gualberto Rodriguez (offgoing nurse). Report included the following information SBAR, Kardex, Intake/Output, MAR and Recent Results.

## 2022-02-12 NOTE — PROGRESS NOTES
1104- Message left for [facility] on call MD to notify of pt's elevated BPs this shift. 189/75 at 0727 and 172/74 at 1058. Pt asymptomatic, states that BP is elevated at every doctor visit, but runs 130s/80's at home. 1120-MD in to see pt, new orders received & carried out.

## 2022-02-12 NOTE — PROGRESS NOTES
Bedside and Verbal shift change report given to HELIO Gil (oncoming nurse) by Ravi Dillon (offgoing nurse). Report included the following information SBAR, Kardex, Intake/Output, MAR and Recent Results.

## 2022-02-13 ENCOUNTER — APPOINTMENT (OUTPATIENT)
Dept: NON INVASIVE DIAGNOSTICS | Age: 67
DRG: 327 | End: 2022-02-13
Attending: INTERNAL MEDICINE
Payer: COMMERCIAL

## 2022-02-13 LAB
ALBUMIN SERPL-MCNC: 2.2 G/DL (ref 3.5–5)
ALBUMIN/GLOB SERPL: 0.5 {RATIO} (ref 1.1–2.2)
ALP SERPL-CCNC: 70 U/L (ref 45–117)
ALT SERPL-CCNC: 30 U/L (ref 12–78)
ANION GAP SERPL CALC-SCNC: 7 MMOL/L (ref 5–15)
ANION GAP SERPL CALC-SCNC: 7 MMOL/L (ref 5–15)
APTT PPP: 23.5 SEC (ref 22.1–31)
AST SERPL-CCNC: 26 U/L (ref 15–37)
ATRIAL RATE: 108 BPM
BASOPHILS # BLD: 0 K/UL (ref 0–0.1)
BASOPHILS # BLD: 0.1 K/UL (ref 0–0.1)
BASOPHILS NFR BLD: 0 % (ref 0–1)
BASOPHILS NFR BLD: 0 % (ref 0–1)
BILIRUB SERPL-MCNC: 0.3 MG/DL (ref 0.2–1)
BNP SERPL-MCNC: 884 PG/ML
BUN SERPL-MCNC: 4 MG/DL (ref 6–20)
BUN SERPL-MCNC: 5 MG/DL (ref 6–20)
BUN/CREAT SERPL: 6 (ref 12–20)
BUN/CREAT SERPL: 6 (ref 12–20)
CALCIUM SERPL-MCNC: 8.1 MG/DL (ref 8.5–10.1)
CALCIUM SERPL-MCNC: 8.2 MG/DL (ref 8.5–10.1)
CALCULATED P AXIS, ECG09: 63 DEGREES
CALCULATED R AXIS, ECG10: 16 DEGREES
CALCULATED T AXIS, ECG11: 32 DEGREES
CHLORIDE SERPL-SCNC: 106 MMOL/L (ref 97–108)
CHLORIDE SERPL-SCNC: 108 MMOL/L (ref 97–108)
CO2 SERPL-SCNC: 26 MMOL/L (ref 21–32)
CO2 SERPL-SCNC: 28 MMOL/L (ref 21–32)
COMMENT, HOLDF: NORMAL
CREAT SERPL-MCNC: 0.71 MG/DL (ref 0.55–1.02)
CREAT SERPL-MCNC: 0.86 MG/DL (ref 0.55–1.02)
DIAGNOSIS, 93000: NORMAL
DIFFERENTIAL METHOD BLD: ABNORMAL
DIFFERENTIAL METHOD BLD: ABNORMAL
EOSINOPHIL # BLD: 0.1 K/UL (ref 0–0.4)
EOSINOPHIL # BLD: 0.1 K/UL (ref 0–0.4)
EOSINOPHIL NFR BLD: 0 % (ref 0–7)
EOSINOPHIL NFR BLD: 1 % (ref 0–7)
ERYTHROCYTE [DISTWIDTH] IN BLOOD BY AUTOMATED COUNT: 13.2 % (ref 11.5–14.5)
ERYTHROCYTE [DISTWIDTH] IN BLOOD BY AUTOMATED COUNT: 13.2 % (ref 11.5–14.5)
GLOBULIN SER CALC-MCNC: 4.2 G/DL (ref 2–4)
GLUCOSE BLD STRIP.AUTO-MCNC: 113 MG/DL (ref 65–117)
GLUCOSE SERPL-MCNC: 100 MG/DL (ref 65–100)
GLUCOSE SERPL-MCNC: 109 MG/DL (ref 65–100)
HCT VFR BLD AUTO: 23 % (ref 35–47)
HCT VFR BLD AUTO: 26.2 % (ref 35–47)
HGB BLD-MCNC: 7.6 G/DL (ref 11.5–16)
HGB BLD-MCNC: 8.5 G/DL (ref 11.5–16)
IMM GRANULOCYTES # BLD AUTO: 0.1 K/UL (ref 0–0.04)
IMM GRANULOCYTES # BLD AUTO: 0.1 K/UL (ref 0–0.04)
IMM GRANULOCYTES NFR BLD AUTO: 1 % (ref 0–0.5)
IMM GRANULOCYTES NFR BLD AUTO: 1 % (ref 0–0.5)
INR PPP: 1.4 (ref 0.9–1.1)
LACTATE SERPL-SCNC: 1.7 MMOL/L (ref 0.4–2)
LYMPHOCYTES # BLD: 1.4 K/UL (ref 0.8–3.5)
LYMPHOCYTES # BLD: 2 K/UL (ref 0.8–3.5)
LYMPHOCYTES NFR BLD: 12 % (ref 12–49)
LYMPHOCYTES NFR BLD: 20 % (ref 12–49)
MCH RBC QN AUTO: 30.1 PG (ref 26–34)
MCH RBC QN AUTO: 30.4 PG (ref 26–34)
MCHC RBC AUTO-ENTMCNC: 32.4 G/DL (ref 30–36.5)
MCHC RBC AUTO-ENTMCNC: 33 G/DL (ref 30–36.5)
MCV RBC AUTO: 92 FL (ref 80–99)
MCV RBC AUTO: 92.9 FL (ref 80–99)
MONOCYTES # BLD: 0.7 K/UL (ref 0–1)
MONOCYTES # BLD: 0.7 K/UL (ref 0–1)
MONOCYTES NFR BLD: 6 % (ref 5–13)
MONOCYTES NFR BLD: 7 % (ref 5–13)
NEUTS SEG # BLD: 7.4 K/UL (ref 1.8–8)
NEUTS SEG # BLD: 9 K/UL (ref 1.8–8)
NEUTS SEG NFR BLD: 71 % (ref 32–75)
NEUTS SEG NFR BLD: 81 % (ref 32–75)
NRBC # BLD: 0 K/UL (ref 0–0.01)
NRBC # BLD: 0 K/UL (ref 0–0.01)
NRBC BLD-RTO: 0 PER 100 WBC
NRBC BLD-RTO: 0 PER 100 WBC
P-R INTERVAL, ECG05: 156 MS
PLATELET # BLD AUTO: 504 K/UL (ref 150–400)
PLATELET # BLD AUTO: 548 K/UL (ref 150–400)
PMV BLD AUTO: 9.5 FL (ref 8.9–12.9)
PMV BLD AUTO: 9.5 FL (ref 8.9–12.9)
POTASSIUM SERPL-SCNC: 3.3 MMOL/L (ref 3.5–5.1)
POTASSIUM SERPL-SCNC: 3.5 MMOL/L (ref 3.5–5.1)
PROT SERPL-MCNC: 6.4 G/DL (ref 6.4–8.2)
PROTHROMBIN TIME: 14.3 SEC (ref 9–11.1)
Q-T INTERVAL, ECG07: 338 MS
QRS DURATION, ECG06: 86 MS
QTC CALCULATION (BEZET), ECG08: 452 MS
RBC # BLD AUTO: 2.5 M/UL (ref 3.8–5.2)
RBC # BLD AUTO: 2.82 M/UL (ref 3.8–5.2)
SAMPLES BEING HELD,HOLD: NORMAL
SERVICE CMNT-IMP: NORMAL
SODIUM SERPL-SCNC: 141 MMOL/L (ref 136–145)
SODIUM SERPL-SCNC: 141 MMOL/L (ref 136–145)
THERAPEUTIC RANGE,PTTT: NORMAL SECS (ref 58–77)
VENTRICULAR RATE, ECG03: 108 BPM
WBC # BLD AUTO: 10.4 K/UL (ref 3.6–11)
WBC # BLD AUTO: 11.2 K/UL (ref 3.6–11)

## 2022-02-13 PROCEDURE — 74011250636 HC RX REV CODE- 250/636: Performed by: INTERNAL MEDICINE

## 2022-02-13 PROCEDURE — 74011250636 HC RX REV CODE- 250/636: Performed by: SURGERY

## 2022-02-13 PROCEDURE — 74011000258 HC RX REV CODE- 258: Performed by: INTERNAL MEDICINE

## 2022-02-13 PROCEDURE — 65660000000 HC RM CCU STEPDOWN

## 2022-02-13 PROCEDURE — 93005 ELECTROCARDIOGRAM TRACING: CPT

## 2022-02-13 PROCEDURE — 74011250636 HC RX REV CODE- 250/636: Performed by: NURSE PRACTITIONER

## 2022-02-13 PROCEDURE — 74011000250 HC RX REV CODE- 250: Performed by: INTERNAL MEDICINE

## 2022-02-13 PROCEDURE — 74011000250 HC RX REV CODE- 250: Performed by: SURGERY

## 2022-02-13 PROCEDURE — 85730 THROMBOPLASTIN TIME PARTIAL: CPT

## 2022-02-13 PROCEDURE — C9113 INJ PANTOPRAZOLE SODIUM, VIA: HCPCS | Performed by: SURGERY

## 2022-02-13 PROCEDURE — 83880 ASSAY OF NATRIURETIC PEPTIDE: CPT

## 2022-02-13 PROCEDURE — 85610 PROTHROMBIN TIME: CPT

## 2022-02-13 PROCEDURE — 2709999900 HC NON-CHARGEABLE SUPPLY

## 2022-02-13 PROCEDURE — 36415 COLL VENOUS BLD VENIPUNCTURE: CPT

## 2022-02-13 PROCEDURE — 85025 COMPLETE CBC W/AUTO DIFF WBC: CPT

## 2022-02-13 PROCEDURE — 83605 ASSAY OF LACTIC ACID: CPT

## 2022-02-13 PROCEDURE — 80053 COMPREHEN METABOLIC PANEL: CPT

## 2022-02-13 PROCEDURE — 83735 ASSAY OF MAGNESIUM: CPT

## 2022-02-13 PROCEDURE — 82962 GLUCOSE BLOOD TEST: CPT

## 2022-02-13 PROCEDURE — 74011250637 HC RX REV CODE- 250/637: Performed by: SURGERY

## 2022-02-13 RX ORDER — HYDRALAZINE HYDROCHLORIDE 20 MG/ML
20 INJECTION INTRAMUSCULAR; INTRAVENOUS
Status: DISCONTINUED | OUTPATIENT
Start: 2022-02-13 | End: 2022-02-17 | Stop reason: HOSPADM

## 2022-02-13 RX ORDER — FUROSEMIDE 10 MG/ML
40 INJECTION INTRAMUSCULAR; INTRAVENOUS ONCE
Status: COMPLETED | OUTPATIENT
Start: 2022-02-13 | End: 2022-02-13

## 2022-02-13 RX ORDER — POTASSIUM CHLORIDE 750 MG/1
20 TABLET, FILM COATED, EXTENDED RELEASE ORAL 2 TIMES DAILY
Status: COMPLETED | OUTPATIENT
Start: 2022-02-13 | End: 2022-02-14

## 2022-02-13 RX ORDER — FUROSEMIDE 10 MG/ML
20 INJECTION INTRAMUSCULAR; INTRAVENOUS ONCE
Status: DISCONTINUED | OUTPATIENT
Start: 2022-02-13 | End: 2022-02-13

## 2022-02-13 RX ORDER — FUROSEMIDE 10 MG/ML
40 INJECTION INTRAMUSCULAR; INTRAVENOUS 2 TIMES DAILY
Status: DISCONTINUED | OUTPATIENT
Start: 2022-02-13 | End: 2022-02-16

## 2022-02-13 RX ORDER — MAGNESIUM SULFATE HEPTAHYDRATE 40 MG/ML
2 INJECTION, SOLUTION INTRAVENOUS ONCE
Status: COMPLETED | OUTPATIENT
Start: 2022-02-13 | End: 2022-02-13

## 2022-02-13 RX ORDER — METOPROLOL TARTRATE 5 MG/5ML
5 INJECTION INTRAVENOUS ONCE
Status: COMPLETED | OUTPATIENT
Start: 2022-02-13 | End: 2022-02-13

## 2022-02-13 RX ORDER — DILTIAZEM HYDROCHLORIDE 5 MG/ML
5 INJECTION INTRAVENOUS ONCE
Status: COMPLETED | OUTPATIENT
Start: 2022-02-13 | End: 2022-02-13

## 2022-02-13 RX ADMIN — SODIUM CHLORIDE 15 MG/HR: 900 INJECTION, SOLUTION INTRAVENOUS at 19:31

## 2022-02-13 RX ADMIN — SODIUM CHLORIDE, PRESERVATIVE FREE 40 MG: 5 INJECTION INTRAVENOUS at 08:20

## 2022-02-13 RX ADMIN — ENOXAPARIN SODIUM 80 MG: 100 INJECTION SUBCUTANEOUS at 01:02

## 2022-02-13 RX ADMIN — POTASSIUM CHLORIDE 20 MEQ: 750 TABLET, FILM COATED, EXTENDED RELEASE ORAL at 16:56

## 2022-02-13 RX ADMIN — MAGNESIUM SULFATE HEPTAHYDRATE 2 G: 40 INJECTION, SOLUTION INTRAVENOUS at 12:55

## 2022-02-13 RX ADMIN — FLUCONAZOLE 400 MG: 2 INJECTION, SOLUTION INTRAVENOUS at 21:30

## 2022-02-13 RX ADMIN — FUROSEMIDE 40 MG: 10 INJECTION, SOLUTION INTRAMUSCULAR; INTRAVENOUS at 16:53

## 2022-02-13 RX ADMIN — SODIUM CHLORIDE, PRESERVATIVE FREE 40 MG: 5 INJECTION INTRAVENOUS at 21:30

## 2022-02-13 RX ADMIN — SODIUM CHLORIDE 2.5 MG/HR: 900 INJECTION, SOLUTION INTRAVENOUS at 13:34

## 2022-02-13 RX ADMIN — METOPROLOL TARTRATE 5 MG: 5 INJECTION INTRAVENOUS at 15:39

## 2022-02-13 RX ADMIN — ENOXAPARIN SODIUM 80 MG: 100 INJECTION SUBCUTANEOUS at 15:38

## 2022-02-13 RX ADMIN — ERTAPENEM SODIUM 1 G: 1 INJECTION, POWDER, LYOPHILIZED, FOR SOLUTION INTRAMUSCULAR; INTRAVENOUS at 10:18

## 2022-02-13 RX ADMIN — FUROSEMIDE 40 MG: 10 INJECTION, SOLUTION INTRAMUSCULAR; INTRAVENOUS at 10:18

## 2022-02-13 RX ADMIN — DILTIAZEM HYDROCHLORIDE 5 MG: 5 INJECTION INTRAVENOUS at 13:00

## 2022-02-13 RX ADMIN — HYDRALAZINE HYDROCHLORIDE 20 MG: 20 INJECTION INTRAMUSCULAR; INTRAVENOUS at 01:01

## 2022-02-13 RX ADMIN — ALPRAZOLAM 0.25 MG: 0.25 TABLET ORAL at 12:01

## 2022-02-13 RX ADMIN — POTASSIUM CHLORIDE 20 MEQ: 750 TABLET, FILM COATED, EXTENDED RELEASE ORAL at 10:18

## 2022-02-13 NOTE — PROGRESS NOTES
0001: BP is 177/78. RN called Dr. Mil Jimenez for treatment as needed.      0045: Hydralazine ordered for SBO>150    0101: Hydralazine administered     0123: BP is 125/62

## 2022-02-13 NOTE — PROGRESS NOTES
1222: RRT called for -160s. Pt does not c/o CP or SOB. EKG completed, showed Afib RVR. 1235: Dr. Margaret Montejo at bedside. Pt upgraded to stepdown for Diltiazem drip. 1245: Pt up to bathroom. 1255: Mag sulfate started. 1300: Diltiazem 5mg IVP given. 1310: Echo at bedside   1334: Diltiazem drip started.   1350: Pt transferred to ICU 3

## 2022-02-13 NOTE — PROGRESS NOTES
TRANSFER - OUT REPORT:    Verbal report given to Heide Kocher RN(name) on Nani Roe  being transferred to ICU Bed 3003(unit) for change in patient condition(Rapid A-fib -160s)       Report consisted of patients Situation, Background, Assessment and   Recommendations(SBAR). Information from the following report(s) SBAR, Kardex, Intake/Output, MAR and Recent Results was reviewed with the receiving nurse. Lines:   Peripheral IV 85/30/04 Left Cephalic (Active)   Site Assessment Clean, dry, & intact 02/13/22 0800   Phlebitis Assessment 0 02/13/22 0800   Infiltration Assessment 0 02/13/22 0800   Dressing Status Clean, dry, & intact 02/13/22 0800   Dressing Type Transparent;Stabilization/securement device 02/13/22 0800   Hub Color/Line Status Pink;Patent; Flushed;End cap changed; Capped 02/13/22 0800   Action Taken Open ports on tubing capped 02/13/22 0800   Alcohol Cap Used Yes 02/13/22 0800        Opportunity for questions and clarification was provided.       Patient transported with:   Monitor  Registered Nurse - IDA CADET

## 2022-02-13 NOTE — PROGRESS NOTES
0900  Patient tachycardic= 111    Patient denies pain, distress, respirations even and unlabored. Dr. Namita Adhikari notified    EKG done- \"Sinus Tachycardia\"        7583  Patient ZY=791    Ecmwoxehm=583    Patient denies pain, distress, respirations even and unlabored, no chest pain, no N/V. Patient states she's feeling anxious    Xanax given    Encouraged Deep breathing    Placed a call to Attending MD- waiting for call back        [phone]  Patient slight anxiety. Still denies pain, no SOB.    HR still on 160s  Spoke with Dr. Namita Adhikari with order hospitalist consult and tele      Called Rapid Response     Patient not in distress, no pain, no SOB      EKG done- patient on Afib  Seen and examined by Dr. Rajani Kelley    Patient to be transferred to ICU bed 3003    Called ICU- RN on phone- will call back    Patient accompanied by [facility] ICU- mohsen IVP and drip to be given and started by Siena CADET      1320  Report given to Eliz Ask RN  Patient to be transferred to ICU bed [phone]

## 2022-02-13 NOTE — PROGRESS NOTES
Bedside and Verbal shift change report given to [address] [place] [address] (oncoming nurse) by Lizeth Blanton (offgoing nurse). Report included the following information SBAR, Kardex, Procedure Summary, Intake/Output, MAR, Recent Results and Med Rec Status.

## 2022-02-13 NOTE — PROGRESS NOTES
Dr. Rachel Kang called with a curbside question. For her Afib with RVR, would first focus on rate control with IV cardizem and metoprolol. If HR is not able to be controlled, then could consider adding IV amiodarone (but with concomitant use of fluconazole there is risk of QTc prolongation - risk class D). Will see if she spontaneously converts to sinus with rate control. She is already on anticoagulation     Please call if you need a formal consult.

## 2022-02-13 NOTE — CONSULTS
Hospitalist Admission Note    NAME: Bryanna Wilhelm   :  1955   MRN:  [id number]     Date/Time:  2022 1:32 PM    Patient PCP: Toby Sotomayor MD  ________________________________________________________________________    Given the patient's current clinical presentation, I have a high level of concern for decompensation if discharged from the emergency department. Complex decision making was performed, which includes reviewing the patient's available past medical records, laboratory results, and x-ray films. My assessment of this patient's clinical condition and my plan of care is as follows. Assessment / Plan:    #AFwRVR: New onset. Initially hypotensive with systolic 226, so favored amiodarone; however, she is on fluconazole prefer to avoid QT prolongation risk. BP has improved, so will utilize dilt gtt for now. As for etiology, this is multifactorial with volume overload, ongoing infection, and PE contributing.    - Dilt bolus and gtt for now   - Will consider amio if QTc allows   - Diurese; Echo   - Buff lytes   - Cont apixaban   - Tx Infx as below   - Tx PE as below    #Pelvic abscess: Potentially 2/2 recent ex lap. Tentative plan is for CT tmr. ID following and if not amenable to IR drainage, will need ertapenem x 4 weeks. - S/p pip/tazo   - Ertapenem  -    - CT abd/pelv tmr    #Perforated Gastric Ulcer: s/p open repair with Hollace Gaitan patch 22   - Fluconazole  -    - Abx as above   - IV PPI   - Nutrition per Surgery    #R hip abscess?: Gas and fluid containing collection in soft tissue anterior to R hip c/f abscess noted on CT 2/10.    - Will have Ortho re-evaluate    #Pulmonary Emboli: Noted on CT 2/10; rigth upper and lower lobes. - LMWh 1mg/kg; can transition to DOAC at dc; 3 months    #Anemia: Likely blood loss.  Has been stable even on full dose LMWH   - Trend   - Transfuse Hb < 7    #Aspiratoin pna?: Noted on imaging; abx as above    #LAVERNE 22: Given c/f abscess noted above, will have Ortho re-opine          I have personally reviewed the radiographs, laboratory data in Epic and decisions and statements above are based partially on this personal interpretation. Total CC time exclusive of procedures: 35 min    Code Status: Full Code  DVT Prophylaxis: Lovenox  GI Prophylaxis: PPI       Subjective:   CHIEF COMPLAINT: \"tachycardia\"    HISTORY OF PRESENT ILLNESS:     Nani is a 75 y.o.  F with minimal PMHx who presented 2/4 with abdominal pain and was found to have perforated stomach. She had undergone hip replacement 1/31 and had been taking NSAIDs. She was taken to OR emergently and is s/p Grahm patch. She has been on pip/tazo and fluc since that time and has e/o pelvic abscess being addressed, but has not yet undergone drainage. Post-op course also c/b pulmonary embolism for which she is on therapeutic LMWH. She has received several units pRBC but does not have e/o active bleeding. Today, she was noted to be tachycardic. Initially read as sinus tach; however, on my exam during rapid response she is in new AFwRVR with rates in the 140s to 160s. This is new for her. She denies chest pain, sob, pain anywhere, but does endorse mild anxiety. Past Medical History:   Diagnosis Date    Anxiety     COVID-19 vaccine administered     boostered   [employer]    White coat syndrome without diagnosis of hypertension       Past Surgical History:   Procedure Laterality Date    HX COLONOSCOPY  09/2021    HX DILATION AND CURETTAGE  1990    HX ORTHOPAEDIC       Social History     Tobacco Use    Smoking status: Never Smoker    Smokeless tobacco: Never Used   Substance Use Topics    Alcohol use: Yes     Comment: social      Family History   Problem Relation Age of Onset    Clotting Disorder Neg Hx         No Known Allergies     Prior to Admission medications    Medication Sig Start Date End Date Taking?  Authorizing Provider   aspirin delayed-release 81 mg tablet Take 1 Tablet by mouth two (2) times a day. 1/31/22  Yes Oseas Lopez MD   ibuprofen (MOTRIN) 800 mg tablet Take 1 Tablet by mouth every six (6) hours as needed for Pain. 1/31/22  Yes Oseas Lopez MD   acetaminophen (Acetaminophen Pain Relief) 500 mg tablet Take 2 Tablets by mouth every six (6) hours as needed for Pain. 1/31/22  Yes Oseas Lopez MD   ondansetron (ZOFRAN ODT) 8 mg disintegrating tablet Take 0.5 Tablets by mouth every eight (8) hours as needed for Nausea. 1/31/22  Yes Oseas Lopez MD   efinaconazole [facility]) rosendo topical solution Apply  to affected area daily. Yes Provider, Historical   multivitamin (ONE A DAY) tablet Take 1 Tablet by mouth daily.    Yes Provider, Historical     REVIEW OF SYSTEMS:  See HPI for details  General: negative for fever, chills, sweats, weakness, weight loss  Eyes: negative for blurred vision, eye pain, loss of vision, diplopia  Ear Nose and Throat: negative for rhinorrhea, pharyngitis, otalgia, tinnitus, speech or swallowing difficulties  Respiratory:  negative for pleuritic pain, cough, sputum production, wheezing, SOB, MIRANDA  Cardiology:  negative for chest pain, palpitations, orthopnea, PND, edema, syncope   Gastrointestinal: negative for abdominal pain, N/V, dysphagia, change in bowel habits, bleeding  Genitourinary: negative for frequency, urgency, dysuria, hematuria, incontinence  Muskuloskeletal : negative for arthralgia, myalgia  Hematology: negative for easy bruising, bleeding, lymphadenopathy  Dermatological: negative for rash, ulceration, mole change, new lesion  Endocrine: negative for hot flashes or polydipsia  Neurological: negative for headache, dizziness, confusion, focal weakness, paresthesia, memory loss, gait disturbance  Psychological: +for anxiety, depression, agitation    Objective:   VITALS:    Visit Vitals  /78   Pulse (!) 166   Temp 98.8 °F (37.1 °C)   Resp 18   Ht 5' 6\" (1.676 m)   Wt 101.9 kg (224 lb 9.6 oz)   SpO2 96%   BMI 36.25 kg/m²     PHYSICAL EXAM:    Physical Exam:    Gen: Well-developed, well-nourished, in no acute distress  HEENT:  Pink conjunctivae, PERRL, hearing intact to voice, moist mucous membranes  Neck: Supple, without masses, thyroid non-tender  Resp: No accessory muscle use, clear breath sounds without wheezes rales or rhonchi  Card: irreg tachy; 3+ edema to mid thigh  Abd:  Soft, non-tender, non-distended, normoactive bowel sounds are present, no palpable organomegaly and no detectable hernias  Lymph:  No cervical or inguinal adenopathy  Musc: No cyanosis or clubbing  Skin: No rashes or ulcers, skin turgor is good  Neuro:  Cranial nerves are grossly intact, no focal motor weakness, follows commands appropriately  Psych:  Good insight, oriented to person, place and time, alert          _______________________________________________________________________  Care Plan discussed with:    Comments   Patient x Discussed with patient in room. POC outlined and Questions answered    Family      RN x    Care Manager                    Consultant:  ange ÁLVAREZ MD   _______________________________________________________________________  Recommended Disposition:   Home with Family x   HH/PT/OT/RN    SNF/LTC    RAFI    ________________________________________________________________________  TOTAL TIME:  60 Minutes        Comments   >50% of visit spent in counseling and coordination of care  Chart reviewed  Discussion with patient and/or family and questions answered     ________________________________________________________________________  Signed: Linda Jurado MD    This note will not be viewable in [address] Procedures: see electronic medical records for all procedures/Xrays and details which were not copied into this note but were reviewed prior to creation of Plan.     LAB DATA REVIEWED:    Recent Results (from the past 24 hour(s))   CBC WITH AUTOMATED DIFF    Collection Time: 02/13/22  1:17 AM   Result Value Ref Range    WBC 10.4 3.6 - 11.0 K/uL RBC 2.50 (L) 3.80 - 5.20 M/uL    HGB 7.6 (L) 11.5 - 16.0 g/dL    HCT 23.0 (L) 35.0 - 47.0 %    MCV 92.0 80.0 - 99.0 FL    MCH 30.4 26.0 - 34.0 PG    MCHC 33.0 30.0 - 36.5 g/dL    RDW 13.2 11.5 - 14.5 %    PLATELET 311 (H) 343 - 400 K/uL    MPV 9.5 8.9 - 12.9 FL    NRBC 0.0 0  WBC    ABSOLUTE NRBC 0.00 0.00 - 0.01 K/uL    NEUTROPHILS 71 32 - 75 %    LYMPHOCYTES 20 12 - 49 %    MONOCYTES 7 5 - 13 %    EOSINOPHILS 1 0 - 7 %    BASOPHILS 0 0 - 1 %    IMMATURE GRANULOCYTES 1 (H) 0.0 - 0.5 %    ABS. NEUTROPHILS 7.4 1.8 - 8.0 K/UL    ABS. LYMPHOCYTES 2.0 0.8 - 3.5 K/UL    ABS. MONOCYTES 0.7 0.0 - 1.0 K/UL    ABS. EOSINOPHILS 0.1 0.0 - 0.4 K/UL    ABS. BASOPHILS 0.0 0.0 - 0.1 K/UL    ABS. IMM.  GRANS. 0.1 (H) 0.00 - 0.04 K/UL    DF AUTOMATED     METABOLIC PANEL, BASIC    Collection Time: 02/13/22  1:17 AM   Result Value Ref Range    Sodium 141 136 - 145 mmol/L    Potassium 3.3 (L) 3.5 - 5.1 mmol/L    Chloride 108 97 - 108 mmol/L    CO2 26 21 - 32 mmol/L    Anion gap 7 5 - 15 mmol/L    Glucose 100 65 - 100 mg/dL    BUN 4 (L) 6 - 20 MG/DL    Creatinine 0.71 0.55 - 1.02 MG/DL    BUN/Creatinine ratio 6 (L) 12 - 20      GFR est AA >60 >60 ml/min/1.73m2    GFR est non-AA >60 >60 ml/min/1.73m2    Calcium 8.1 (L) 8.5 - 10.1 MG/DL   GLUCOSE, POC    Collection Time: 02/13/22 12:31 PM   Result Value Ref Range    Glucose (POC) 113 65 - 117 mg/dL    Performed by Dimple Na CHIZOBA    METABOLIC PANEL, COMPREHENSIVE    Collection Time: 02/13/22 12:41 PM   Result Value Ref Range    Sodium 141 136 - 145 mmol/L    Potassium 3.5 3.5 - 5.1 mmol/L    Chloride 106 97 - 108 mmol/L    CO2 28 21 - 32 mmol/L    Anion gap 7 5 - 15 mmol/L    Glucose 109 (H) 65 - 100 mg/dL    BUN 5 (L) 6 - 20 MG/DL    Creatinine 0.86 0.55 - 1.02 MG/DL    BUN/Creatinine ratio 6 (L) 12 - 20      GFR est AA >60 >60 ml/min/1.73m2    GFR est non-AA >60 >60 ml/min/1.73m2    Calcium 8.2 (L) 8.5 - 10.1 MG/DL    Bilirubin, total 0.3 0.2 - 1.0 MG/DL    ALT (SGPT) 30 12 - 78 U/L    AST (SGOT) 26 15 - 37 U/L    Alk. phosphatase 70 45 - 117 U/L    Protein, total 6.4 6.4 - 8.2 g/dL    Albumin 2.2 (L) 3.5 - 5.0 g/dL    Globulin 4.2 (H) 2.0 - 4.0 g/dL    A-G Ratio 0.5 (L) 1.1 - 2.2     LACTIC ACID    Collection Time: 02/13/22 12:41 PM   Result Value Ref Range    Lactic acid 1.7 0.4 - 2.0 MMOL/L   CBC WITH AUTOMATED DIFF    Collection Time: 02/13/22 12:41 PM   Result Value Ref Range    WBC 11.2 (H) 3.6 - 11.0 K/uL    RBC 2.82 (L) 3.80 - 5.20 M/uL    HGB 8.5 (L) 11.5 - 16.0 g/dL    HCT 26.2 (L) 35.0 - 47.0 %    MCV 92.9 80.0 - 99.0 FL    MCH 30.1 26.0 - 34.0 PG    MCHC 32.4 30.0 - 36.5 g/dL    RDW 13.2 11.5 - 14.5 %    PLATELET 970 (H) 895 - 400 K/uL    MPV 9.5 8.9 - 12.9 FL    NRBC 0.0 0  WBC    ABSOLUTE NRBC 0.00 0.00 - 0.01 K/uL    NEUTROPHILS 81 (H) 32 - 75 %    LYMPHOCYTES 12 12 - 49 %    MONOCYTES 6 5 - 13 %    EOSINOPHILS 0 0 - 7 %    BASOPHILS 0 0 - 1 %    IMMATURE GRANULOCYTES 1 (H) 0.0 - 0.5 %    ABS. NEUTROPHILS 9.0 (H) 1.8 - 8.0 K/UL    ABS. LYMPHOCYTES 1.4 0.8 - 3.5 K/UL    ABS. MONOCYTES 0.7 0.0 - 1.0 K/UL    ABS. EOSINOPHILS 0.1 0.0 - 0.4 K/UL    ABS. BASOPHILS 0.1 0.0 - 0.1 K/UL    ABS. IMM. GRANS. 0.1 (H) 0.00 - 0.04 K/UL    DF AUTOMATED     NT-PRO BNP    Collection Time: 02/13/22 12:41 PM   Result Value Ref Range    NT pro- (H) <125 PG/ML   PROTHROMBIN TIME + INR    Collection Time: 02/13/22 12:41 PM   Result Value Ref Range    INR 1.4 (H) 0.9 - 1.1      Prothrombin time 14.3 (H) 9.0 - 11.1 sec   PTT    Collection Time: 02/13/22 12:41 PM   Result Value Ref Range    aPTT 23.5 22.1 - 31.0 sec    aPTT, therapeutic range     58.0 - 77.0 SECS   SAMPLES BEING HELD    Collection Time: 02/13/22 12:41 PM   Result Value Ref Range    SAMPLES BEING HELD 1sst     COMMENT        Add-on orders for these samples will be processed based on acceptable specimen integrity and analyte stability, which may vary by analyte.

## 2022-02-13 NOTE — ROUTINE PROCESS
1300 - report received from [place], [address] [place] [address] on 5th floor    1400  - pt arrived to room. 1419 -  Cardizem drip was increased at 1400 to 5mg, but order states drip can only be titrated every 30 minutes. HR 140s-150s, pt is asymptomatic. RN reached out to Dr. Tyrese Larson, physician stated we can titrate drip every 15 minutes.  also at bedside requesting to speak to MD. Dr. Tyrese Larson aware. 1430 - cardizem drip maxed out at 15mg. HR still in 140s. Pt still asymptomatic. MD aware. 1539 - IV metoprolol given, HR still in 130s-140s. MD aware. 1600 - . Pt remains asymptomatic. 1900 - Bedside shift change report given to Macy Ruff RN (oncoming nurse) by Carter Recio RN (offgoing nurse). Report included the following information SBAR, Kardex, ED Summary, Intake/Output, MAR, Accordion, Recent Results, Med Rec Status and Cardiac Rhythm Afib.

## 2022-02-13 NOTE — PROGRESS NOTES
Surgery    Feeling better. Took a shower yesterday. C/o leg edema. Had good UOP after low dose lasix yesterday, but still with edema.     AFVS  Abd soft, NT, ND  BLE with pitting edema    A/P:  NPO after MN for CT and possible drainage tomorrow  CT ordered  Continue diuresis with 40mg lasix this am.  Replete potassium  OOB

## 2022-02-13 NOTE — PROGRESS NOTES
Bedside shift change report given to [address] Jaime Ramsay (oncoming nurse) by Donte Holder (offgoing nurse). Report included the following information SBAR, Kardex, Intake/Output, MAR and Recent Results.

## 2022-02-14 ENCOUNTER — APPOINTMENT (OUTPATIENT)
Dept: CT IMAGING | Age: 67
DRG: 327 | End: 2022-02-14
Attending: SURGERY
Payer: COMMERCIAL

## 2022-02-14 ENCOUNTER — APPOINTMENT (OUTPATIENT)
Dept: NON INVASIVE DIAGNOSTICS | Age: 67
DRG: 327 | End: 2022-02-14
Attending: INTERNAL MEDICINE
Payer: COMMERCIAL

## 2022-02-14 ENCOUNTER — APPOINTMENT (OUTPATIENT)
Dept: CT IMAGING | Age: 67
DRG: 327 | End: 2022-02-14
Attending: NURSE PRACTITIONER
Payer: COMMERCIAL

## 2022-02-14 LAB
ANION GAP SERPL CALC-SCNC: 8 MMOL/L (ref 5–15)
ATRIAL RATE: 150 BPM
ATRIAL RATE: 166 BPM
BUN SERPL-MCNC: 7 MG/DL (ref 6–20)
BUN/CREAT SERPL: 10 (ref 12–20)
CALCIUM SERPL-MCNC: 7.8 MG/DL (ref 8.5–10.1)
CALCULATED R AXIS, ECG10: 14 DEGREES
CALCULATED R AXIS, ECG10: 18 DEGREES
CALCULATED T AXIS, ECG11: 15 DEGREES
CALCULATED T AXIS, ECG11: 55 DEGREES
CHLORIDE SERPL-SCNC: 108 MMOL/L (ref 97–108)
CO2 SERPL-SCNC: 26 MMOL/L (ref 21–32)
CREAT SERPL-MCNC: 0.72 MG/DL (ref 0.55–1.02)
DIAGNOSIS, 93000: NORMAL
DIAGNOSIS, 93000: NORMAL
ECHO AV AREA PEAK VELOCITY: 1.9 CM2
ECHO AV AREA PEAK VELOCITY: 1.9 CM2
ECHO AV PEAK GRADIENT: 20 MMHG
ECHO AV PEAK VELOCITY: 2.2 M/S
ECHO AV VELOCITY RATIO: 0.64
ECHO LA DIAMETER INDEX: 1.67 CM/M2
ECHO LA DIAMETER: 3.5 CM
ECHO LV E' LATERAL VELOCITY: 12 CM/S
ECHO LV E' SEPTAL VELOCITY: 11 CM/S
ECHO LV FRACTIONAL SHORTENING: 26 % (ref 28–44)
ECHO LV INTERNAL DIMENSION DIASTOLE INDEX: 2.52 CM/M2
ECHO LV INTERNAL DIMENSION DIASTOLIC: 5.3 CM (ref 3.9–5.3)
ECHO LV INTERNAL DIMENSION SYSTOLIC INDEX: 1.86 CM/M2
ECHO LV INTERNAL DIMENSION SYSTOLIC: 3.9 CM
ECHO LV IVSD: 0.9 CM (ref 0.6–0.9)
ECHO LV MASS 2D: 150.1 G (ref 67–162)
ECHO LV MASS INDEX 2D: 71.5 G/M2 (ref 43–95)
ECHO LV POSTERIOR WALL DIASTOLIC: 0.7 CM (ref 0.6–0.9)
ECHO LV RELATIVE WALL THICKNESS RATIO: 0.26
ECHO LVOT AREA: 3.1 CM2
ECHO LVOT DIAM: 2 CM
ECHO LVOT PEAK GRADIENT: 8 MMHG
ECHO LVOT PEAK VELOCITY: 1.4 M/S
ECHO MV A VELOCITY: 0.95 M/S
ECHO MV E DECELERATION TIME (DT): 108 MS
ECHO MV E VELOCITY: 1.05 M/S
ECHO MV E/A RATIO: 1.11
ECHO MV E/E' LATERAL: 8.75
ECHO MV E/E' RATIO (AVERAGED): 9.15
ECHO MV E/E' SEPTAL: 9.55
GLUCOSE SERPL-MCNC: 111 MG/DL (ref 65–100)
MAGNESIUM SERPL-MCNC: 2 MG/DL (ref 1.6–2.4)
POTASSIUM SERPL-SCNC: 3.6 MMOL/L (ref 3.5–5.1)
Q-T INTERVAL, ECG07: 278 MS
Q-T INTERVAL, ECG07: 306 MS
QRS DURATION, ECG06: 80 MS
QRS DURATION, ECG06: 82 MS
QTC CALCULATION (BEZET), ECG08: 442 MS
QTC CALCULATION (BEZET), ECG08: 456 MS
SODIUM SERPL-SCNC: 142 MMOL/L (ref 136–145)
VENTRICULAR RATE, ECG03: 134 BPM
VENTRICULAR RATE, ECG03: 152 BPM

## 2022-02-14 PROCEDURE — 99153 MOD SED SAME PHYS/QHP EA: CPT

## 2022-02-14 PROCEDURE — 74011000250 HC RX REV CODE- 250: Performed by: SURGERY

## 2022-02-14 PROCEDURE — 36415 COLL VENOUS BLD VENIPUNCTURE: CPT

## 2022-02-14 PROCEDURE — 74177 CT ABD & PELVIS W/CONTRAST: CPT

## 2022-02-14 PROCEDURE — 99152 MOD SED SAME PHYS/QHP 5/>YRS: CPT

## 2022-02-14 PROCEDURE — 99232 SBSQ HOSP IP/OBS MODERATE 35: CPT | Performed by: INTERNAL MEDICINE

## 2022-02-14 PROCEDURE — 87075 CULTR BACTERIA EXCEPT BLOOD: CPT

## 2022-02-14 PROCEDURE — 80048 BASIC METABOLIC PNL TOTAL CA: CPT

## 2022-02-14 PROCEDURE — 87205 SMEAR GRAM STAIN: CPT

## 2022-02-14 PROCEDURE — 74011250637 HC RX REV CODE- 250/637: Performed by: NURSE PRACTITIONER

## 2022-02-14 PROCEDURE — C9113 INJ PANTOPRAZOLE SODIUM, VIA: HCPCS | Performed by: SURGERY

## 2022-02-14 PROCEDURE — C1729 CATH, DRAINAGE: HCPCS

## 2022-02-14 PROCEDURE — 74011000636 HC RX REV CODE- 636: Performed by: RADIOLOGY

## 2022-02-14 PROCEDURE — 77010033678 HC OXYGEN DAILY

## 2022-02-14 PROCEDURE — 93306 TTE W/DOPPLER COMPLETE: CPT

## 2022-02-14 PROCEDURE — 74011250637 HC RX REV CODE- 250/637: Performed by: SURGERY

## 2022-02-14 PROCEDURE — 74011000258 HC RX REV CODE- 258: Performed by: INTERNAL MEDICINE

## 2022-02-14 PROCEDURE — 74011250636 HC RX REV CODE- 250/636: Performed by: NURSE PRACTITIONER

## 2022-02-14 PROCEDURE — 74011250636 HC RX REV CODE- 250/636: Performed by: INTERNAL MEDICINE

## 2022-02-14 PROCEDURE — 74011250636 HC RX REV CODE- 250/636: Performed by: SURGERY

## 2022-02-14 PROCEDURE — 74011250636 HC RX REV CODE- 250/636: Performed by: RADIOLOGY

## 2022-02-14 PROCEDURE — 93306 TTE W/DOPPLER COMPLETE: CPT | Performed by: STUDENT IN AN ORGANIZED HEALTH CARE EDUCATION/TRAINING PROGRAM

## 2022-02-14 PROCEDURE — 49405 IMAGE CATH FLUID COLXN VISC: CPT

## 2022-02-14 PROCEDURE — 77030010546 HC BG URIN DRNG URES -B

## 2022-02-14 PROCEDURE — 65660000000 HC RM CCU STEPDOWN

## 2022-02-14 RX ORDER — MIDAZOLAM HYDROCHLORIDE 1 MG/ML
5 INJECTION, SOLUTION INTRAMUSCULAR; INTRAVENOUS
Status: DISCONTINUED | OUTPATIENT
Start: 2022-02-14 | End: 2022-02-14 | Stop reason: ALTCHOICE

## 2022-02-14 RX ORDER — ENOXAPARIN SODIUM 100 MG/ML
90 INJECTION SUBCUTANEOUS EVERY 12 HOURS
Status: DISCONTINUED | OUTPATIENT
Start: 2022-02-14 | End: 2022-02-14

## 2022-02-14 RX ORDER — ENOXAPARIN SODIUM 100 MG/ML
100 INJECTION SUBCUTANEOUS EVERY 12 HOURS
Status: DISCONTINUED | OUTPATIENT
Start: 2022-02-15 | End: 2022-02-17 | Stop reason: HOSPADM

## 2022-02-14 RX ORDER — DILTIAZEM HYDROCHLORIDE 30 MG/1
30 TABLET, FILM COATED ORAL EVERY 6 HOURS
Status: DISCONTINUED | OUTPATIENT
Start: 2022-02-14 | End: 2022-02-16

## 2022-02-14 RX ORDER — FENTANYL CITRATE 50 UG/ML
100 INJECTION, SOLUTION INTRAMUSCULAR; INTRAVENOUS
Status: DISCONTINUED | OUTPATIENT
Start: 2022-02-14 | End: 2022-02-14 | Stop reason: ALTCHOICE

## 2022-02-14 RX ORDER — ACETAMINOPHEN 325 MG/1
650 TABLET ORAL
Status: DISCONTINUED | OUTPATIENT
Start: 2022-02-14 | End: 2022-02-17 | Stop reason: HOSPADM

## 2022-02-14 RX ORDER — PANTOPRAZOLE SODIUM 40 MG/1
40 TABLET, DELAYED RELEASE ORAL EVERY 12 HOURS
Status: DISCONTINUED | OUTPATIENT
Start: 2022-02-14 | End: 2022-02-17 | Stop reason: HOSPADM

## 2022-02-14 RX ADMIN — FUROSEMIDE 40 MG: 10 INJECTION, SOLUTION INTRAMUSCULAR; INTRAVENOUS at 08:18

## 2022-02-14 RX ADMIN — MIDAZOLAM 1 MG: 1 INJECTION INTRAMUSCULAR; INTRAVENOUS at 15:05

## 2022-02-14 RX ADMIN — SODIUM CHLORIDE 15 MG/HR: 900 INJECTION, SOLUTION INTRAVENOUS at 01:08

## 2022-02-14 RX ADMIN — MIDAZOLAM 1 MG: 1 INJECTION INTRAMUSCULAR; INTRAVENOUS at 14:51

## 2022-02-14 RX ADMIN — ENOXAPARIN SODIUM 80 MG: 100 INJECTION SUBCUTANEOUS at 01:09

## 2022-02-14 RX ADMIN — MIDAZOLAM 1 MG: 1 INJECTION INTRAMUSCULAR; INTRAVENOUS at 14:46

## 2022-02-14 RX ADMIN — FENTANYL CITRATE 25 MCG: 50 INJECTION, SOLUTION INTRAMUSCULAR; INTRAVENOUS at 14:46

## 2022-02-14 RX ADMIN — FLUCONAZOLE 400 MG: 2 INJECTION, SOLUTION INTRAVENOUS at 21:40

## 2022-02-14 RX ADMIN — ACETAMINOPHEN 650 MG: 325 TABLET ORAL at 01:38

## 2022-02-14 RX ADMIN — SODIUM CHLORIDE, PRESERVATIVE FREE 40 MG: 5 INJECTION INTRAVENOUS at 08:18

## 2022-02-14 RX ADMIN — POTASSIUM CHLORIDE 20 MEQ: 750 TABLET, FILM COATED, EXTENDED RELEASE ORAL at 08:18

## 2022-02-14 RX ADMIN — MIDAZOLAM 1 MG: 1 INJECTION INTRAMUSCULAR; INTRAVENOUS at 14:56

## 2022-02-14 RX ADMIN — FENTANYL CITRATE 25 MCG: 50 INJECTION, SOLUTION INTRAMUSCULAR; INTRAVENOUS at 14:56

## 2022-02-14 RX ADMIN — IOPAMIDOL 100 ML: 755 INJECTION, SOLUTION INTRAVENOUS at 11:25

## 2022-02-14 RX ADMIN — ERTAPENEM SODIUM 1 G: 1 INJECTION, POWDER, LYOPHILIZED, FOR SOLUTION INTRAMUSCULAR; INTRAVENOUS at 12:34

## 2022-02-14 RX ADMIN — PANTOPRAZOLE SODIUM 40 MG: 40 TABLET, DELAYED RELEASE ORAL at 21:40

## 2022-02-14 RX ADMIN — FENTANYL CITRATE 25 MCG: 50 INJECTION, SOLUTION INTRAMUSCULAR; INTRAVENOUS at 14:51

## 2022-02-14 NOTE — PROGRESS NOTES
Patient came down via wheelchair for scheduled CT drain placement. VSS, PIV flushed and patient. Consent obtained and Dr. Corrine Lucio at bedside for consent with review of risks and benefits. Sarabjit notified of readiness  [address]  Patient taken to CT and connected to monitor/fluuids/oxygen. Time out 1451 Start time 1452 End time 1510. Versed 4 mg and Fentanyl   75 mg given for sedation and patient tolerated well. Reslove 8.5 FR Pigtail drain placed to right gluteal region. Fluid sent to lab for culture. 1520 Patient brought back to [facility] for recovery. 1620 Report called to floor for return to room 327  RN aware with questions aked and answered.

## 2022-02-14 NOTE — PROGRESS NOTES
Douglas Fabian [facility] 79  [address] [facility], [address] [place] [address]  [phone]      Medical Progress Note      NAME: Jarrett Arteaga   :  1955  MRM:  [id number]    Date/Time: 2022  2:21 PM           Assessment / Plan:     #AFwRVR: New onset. As for etiology, this is multifactorial with volume overload, ongoing infection, and PE contributing. Responded well to diuresis and dilt gtt. In NSR to sinus tachy today. Echo normal              - Dilt to 30mg PO; could potentially dc if underlying pathologies improving              - Continue diuresis today, can potentially stop tmr              - Buff lytes              - Cont apixaban              - Tx Infx as below              - Tx PE as below     #Pelvic abscess: Now s/p IR drainage .              - S/p pip/tazo              - Ertapenem  -               - F/u Cx data     #Perforated Gastric Ulcer: s/p open repair with Peggyann Ratel patch 22              - Fluconazole  -               - Abx as above              - IV PPI to PO              - Nutrition per Surgery     #R hip fluid collection: S/p hip LAVERNE. Ortho reviewed, low c/f infection. #Pulmonary Emboli: Noted on CT 2/10; right upper and lower lobes. - LMWh 1mg/kg; can transition to DOAC at dc; 3 months     #Anemia: Likely blood loss. Has been stable even on full dose LMWH              - Trend              - Transfuse Hb < 7     #Aspiratoin pna?: Noted on imaging; abx as above     #LAVERNE 22: As above         Will continue to follow with you.  ___________________________________________________    Attending Physician: Vanessa Goss MD        Subjective:     Chief Complaint:  Angella Co. Converted to NSR. Feels a little better; legs less swollen. To CT this morning and possible IR drainage.      ROS:  (bold if positive, if negative)    Tolerating PT  Tolerating Diet          Objective:       Vitals:          Last 24hrs VS reviewed since prior progress note. Most recent are:    Visit Vitals  BP (!) 114/57 (BP 1 Location: Right upper arm, BP Patient Position: At rest)   Pulse 99   Temp 97.8 °F (36.6 °C)   Resp 18   Ht 5' 6\" (1.676 m)   Wt 101.6 kg (224 lb)   SpO2 97%   BMI 36.15 kg/m²     SpO2 Readings from Last 6 Encounters:   02/14/22 97%   01/31/22 100%   01/24/22 99%    O2 Flow Rate (L/min): 2 l/min       Intake/Output Summary (Last 24 hours) at 2/14/2022 1421  Last data filed at 2/14/2022 0934  Gross per 24 hour   Intake 451.71 ml   Output --   Net 451.71 ml          Exam:     Physical Exam:    Gen:  Well-developed, well-nourished, in no acute distress  HEENT:  Pink conjunctivae, PERRL, hearing intact to voice, moist mucous membranes  Neck:  Supple, without masses, thyroid non-tender  Resp:  No accessory muscle use, clear breath sounds without wheezes rales or rhonchi  Card:  No murmurs, normal S1, S2 without thrills, bruits 1+ peripheral edema  Abd:  Soft, non-tender, non-distended, normoactive bowel sounds are present  Musc:  No cyanosis or clubbing  Skin:  No rashes or ulcers, skin turgor is good  Neuro:  Cranial nerves 3-12 are grossly intact,  strength is 5/5 bilaterally and dorsi / plantarflexion is 5/5 bilaterally, follows commands appropriately  Psych:  Good insight, oriented to person, place and time, alert    Medications Reviewed: (see below)    Lab Data Reviewed: (see below)    ______________________________________________________________________    Medications:     Current Facility-Administered Medications   Medication Dose Route Frequency    acetaminophen (TYLENOL) tablet 650 mg  650 mg Oral Q4H PRN    [Held by provider] enoxaparin (LOVENOX) injection 90 mg  90 mg SubCUTAneous Q12H    midazolam (VERSED) injection 5 mg  5 mg IntraVENous RAD PRN    fentaNYL citrate (PF) injection 100 mcg  100 mcg IntraVENous RAD PRN    pantoprazole (PROTONIX) tablet 40 mg  40 mg Oral Q12H    hydrALAZINE (APRESOLINE) 20 mg/mL injection 20 mg  20 mg IntraVENous Q6H PRN    furosemide (LASIX) injection 40 mg  40 mg IntraVENous BID    dilTIAZem (CARDIZEM) 100 mg in 0.9% sodium chloride (MBP/ADV) 100 mL infusion  0-15 mg/hr IntraVENous TITRATE    ALPRAZolam (XANAX) tablet 0.25 mg  0.25 mg Oral Q8H PRN    ertapenem (INVANZ) 1 g in 0.9% sodium chloride (MBP/ADV) 50 mL MBP  1 g IntraVENous DAILY    oxyCODONE IR (ROXICODONE) tablet 5 mg  5 mg Oral Q4H PRN    morphine injection 2 mg  2 mg IntraVENous Q3H PRN    acetaminophen (TYLENOL) suppository 650 mg  650 mg Rectal Q6H PRN    0.9% sodium chloride infusion 250 mL  250 mL IntraVENous PRN    LORazepam (ATIVAN) injection 0.5 mg  0.5 mg IntraVENous Q12H PRN    fluconazole (DIFLUCAN) 400mg/200 mL IVPB (premix)  400 mg IntraVENous Q24H            Lab Review:     Recent Labs     02/13/22  1241 02/13/22  0117 02/12/22  0948   WBC 11.2* 10.4 10.0   HGB 8.5* 7.6* 7.8*   HCT 26.2* 23.0* 23.4*   * 504* 470*     Recent Labs     02/14/22  0339 02/13/22  1241 02/13/22  0117    141 141   K 3.6 3.5 3.3*    106 108   CO2 26 28 26   * 109* 100   BUN 7 5* 4*   CREA 0.72 0.86 0.71   CA 7.8* 8.2* 8.1*   MG  --  2.0  --    ALB  --  2.2*  --    ALT  --  30  --    INR  --  1.4*  --      No components found for: [place]

## 2022-02-14 NOTE — PROGRESS NOTES
0930 TRANSFER - IN REPORT:    Verbal report received from ABNER RN (name) on Nani Iverson  being received from ICU (unit) for routine progression of care      Report consisted of patients Situation, Background, Assessment and   Recommendations(SBAR). Information from the following report(s) SBAR, Kardex, Procedure Summary, Intake/Output, MAR, Accordion, Recent Results, Med Rec Status and Cardiac Rhythm NSR was reviewed with the receiving nurse. This patient was assisted with Intentional Toileting every 2 hours during this shift as appropriate. Documentation of ambulation and output reflected on Flowsheet as appropriate. Purposeful hourly rounding was completed using AIDET and 5Ps. Outcomes of PHR documented as they occurred. Bed alarm in use as appropriate. Dual Suction and ambubag in place. Opportunity for questions and clarification was provided. Assessment completed upon patients arrival to unit and care assumed. Fredrikc to send up [facility]; it is not in the ICU or the patient's specific bin    [phone] Pharmacy sent [facility] to ICU; this RN called ICU to ask them to send the med to [facility].    1100 Patient off floor to imaging. [facility] is still not on [facility]; will administer when patient returns. 1230 Patient back in room    1300 Notified MD that CT needs an order for drain    1400 Patient off floor to CT. Will remove HERIBERTO drain when pt returns. Tip 555 to [employer] with general surgery. She stated that as long as IR says the procedure went well and there were no complications, such as bleeding, the patient should get her dose of Lovenox that was held at 1300.     1645 Patient back in room. Protective dressing applied to left gluteal cleft to prevent pressure injury from new drain. HERIBERTO drain removed. Dressings are clean, dry, intact, and dated. Patient refusing lovenox at this time; states she would like to take it with her evening meds.

## 2022-02-14 NOTE — PROGRESS NOTES
Physical Therapy  Attempted to see patient. Patient declining PT session due to fatigue from multiple off the floor transfers today and anticipate off the floor for drain.    Karla Meyers PT,DPT,NCS

## 2022-02-14 NOTE — PROGRESS NOTES
Physician Progress Note      PATIENT:               Jesus Manuel Medley  CSN #:                  [id number]  :                       1955  ADMIT DATE:       2022 10:40 AM  [address] [place] DATE:  RESPONDING  PROVIDER #:        Lauren Joe MD          QUERY TEXT:    Dear Attending,    Patient admitted with perforated gastric ulcer, noted to have  RD assessment. If possible, please document in progress notes and discharge summary if you are evaluating and /or treating any of the following: The medical record reflects the following:  Risk Factors: acute illness with perforated gastric ulcer  Clinical Indicators:  RD- Malnutrition Status:  Mild malnutrition  Context:  Acute illness  Findings of the 6 clinical characteristics of malnutrition:  Weight Loss:  1 - 1% to 2% over 1 week (2.5% x 2 weeks)  Treatment: RD assessment, GI bland diet, monitoring    ASPEN Criteria:  https://aspenjournals. onlinelibrary. padron. com/doi/full/10.1177/4318117193997207      Thank you,    Luis Cha RN  [facility]  [phone]  Options provided:  -- Protein calorie malnutrition mild  -- Protein calorie malnutrition- unspecified  -- Other - I will add my own diagnosis  -- Disagree - Not applicable / Not valid  -- Disagree - Clinically unable to determine / Unknown  -- Refer to Clinical Documentation Reviewer    PROVIDER RESPONSE TEXT:    This patient has protein calorie malnutrition- unable to further specify.     Query created by: Manisha Ott on 2/10/2022 1:55 PM      Electronically signed by:  Lauren Joe MD 2022 1:32 PM

## 2022-02-14 NOTE — PROGRESS NOTES
0700: Bedside and Verbal shift change report given to HELIO Tucker (oncoming nurse) by Yousuf Boykin RN (offgoing nurse). Report included the following information SBAR, Intake/Output, MAR, Recent Results and Cardiac Rhythm NSR. Primary Nurse Rico Iqbal and Yousuf Boykin RN performed a dual skin assessment on this patient Impairment noted- see wound doc flow sheet  Chase score is see flow sheet. 0730: Assessment completed. Patient A/Ox4. Incisions assessed. Dilt stopped. Call bell within reach. 0930: TRANSFER - OUT REPORT:    Verbal report given to PCC(name) on Nani Murray  being transferred to PCC(unit) for routine progression of care       Report consisted of patients Situation, Background, Assessment and   Recommendations(SBAR). Information from the following report(s) SBAR, Intake/Output, MAR, Recent Results and Cardiac Rhythm NSR was reviewed with the receiving nurse. Lines:   Peripheral IV 58/35/04 Left Cephalic (Active)   Site Assessment Clean, dry, & intact 02/14/22 0730   Phlebitis Assessment 0 02/14/22 0730   Infiltration Assessment 0 02/14/22 0730   Dressing Status Clean, dry, & intact 02/14/22 0730   Dressing Type Transparent;Stabilization/securement device 02/14/22 0730   Hub Color/Line Status Pink; Infusing 02/14/22 0730   Action Taken Open ports on tubing capped 02/14/22 0730   Alcohol Cap Used Yes 02/14/22 0730        Opportunity for questions and clarification was provided.       Patient transported with:   Monitor

## 2022-02-14 NOTE — H&P
Interventional and Vascular Radiology History and Physical    Patient: Jeremy Cross 75 y.o. female       Chief Complaint: Abdominal Pain and Constipation      History of Present Illness: pelvic fluid     History:    Past Medical History:   Diagnosis Date    Anxiety     COVID-19 vaccine administered     boostered   Pfizer    White coat syndrome without diagnosis of hypertension      Family History   Problem Relation Age of Onset    Clotting Disorder Neg Hx      Social History     Socioeconomic History    Marital status:      Spouse name: Not on file    Number of children: Not on file    Years of education: Not on file    Highest education level: Not on file   Occupational History    Not on file   Tobacco Use    Smoking status: Never Smoker    Smokeless tobacco: Never Used   Substance and Sexual Activity    Alcohol use: Yes     Comment: social    Drug use: Never    Sexual activity: Not on file   Other Topics Concern    Not on file   Social History Narrative    Not on file     Social Determinants of Health     Financial Resource Strain:     Difficulty of Paying Living Expenses: Not on file   Food Insecurity:     Worried About Running Out of Food in the Last Year: Not on file    Chuck of Food in the Last Year: Not on file   Transportation Needs:     Lack of Transportation (Medical): Not on file    Lack of Transportation (Non-Medical):  Not on file   Physical Activity:     Days of Exercise per Week: Not on file    Minutes of Exercise per Session: Not on file   Stress:     Feeling of Stress : Not on file   Social Connections:     Frequency of Communication with Friends and Family: Not on file    Frequency of Social Gatherings with Friends and Family: Not on file    Attends Rastafari Services: Not on file    Active Member of Clubs or Organizations: Not on file    Attends Club or Organization Meetings: Not on file    Marital Status: Not on file   Intimate Partner Violence:     Fear of Current or Ex-Partner: Not on file    Emotionally Abused: Not on file    Physically Abused: Not on file    Sexually Abused: Not on file   Housing Stability:     Unable to Pay for Housing in the Last Year: Not on file    Number of Places Lived in the Last Year: Not on file    Unstable Housing in the Last Year: Not on file       Allergies: No Known Allergies    Current Medications:  Current Facility-Administered Medications   Medication Dose Route Frequency    acetaminophen (TYLENOL) tablet 650 mg  650 mg Oral Q4H PRN    [Held by provider] enoxaparin (LOVENOX) injection 90 mg  90 mg SubCUTAneous Q12H    midazolam (VERSED) injection 5 mg  5 mg IntraVENous RAD PRN    fentaNYL citrate (PF) injection 100 mcg  100 mcg IntraVENous RAD PRN    pantoprazole (PROTONIX) tablet 40 mg  40 mg Oral Q12H    hydrALAZINE (APRESOLINE) 20 mg/mL injection 20 mg  20 mg IntraVENous Q6H PRN    furosemide (LASIX) injection 40 mg  40 mg IntraVENous BID    dilTIAZem (CARDIZEM) 100 mg in 0.9% sodium chloride (MBP/ADV) 100 mL infusion  0-15 mg/hr IntraVENous TITRATE    ALPRAZolam (XANAX) tablet 0.25 mg  0.25 mg Oral Q8H PRN    ertapenem (INVANZ) 1 g in 0.9% sodium chloride (MBP/ADV) 50 mL MBP  1 g IntraVENous DAILY    oxyCODONE IR (ROXICODONE) tablet 5 mg  5 mg Oral Q4H PRN    morphine injection 2 mg  2 mg IntraVENous Q3H PRN    acetaminophen (TYLENOL) suppository 650 mg  650 mg Rectal Q6H PRN    0.9% sodium chloride infusion 250 mL  250 mL IntraVENous PRN    LORazepam (ATIVAN) injection 0.5 mg  0.5 mg IntraVENous Q12H PRN    fluconazole (DIFLUCAN) 400mg/200 mL IVPB (premix)  400 mg IntraVENous Q24H        Physical Exam:  Blood pressure (!) 127/54, pulse 98, temperature 97.8 °F (36.6 °C), resp. rate 24, height 5' 6\" (1.676 m), weight 101.6 kg (224 lb), SpO2 100 %.   LUNG: clear to auscultation bilaterally, HEART: regular rate and rhythm, S1, S2 normal, no murmur, click, rub or gallop      Alerts:    Hospital Problems Never Reviewed          Codes Class Noted POA    Gastric ulcer ICD-10-CM: K25.9  ICD-9-CM: 531.90  2/4/2022 Unknown              Laboratory:      Recent Labs     02/14/22  0339 02/13/22  1241 02/13/22  1241   HGB  --   --  8.5*   HCT  --   --  26.2*   WBC  --   --  11.2*   PLT  --   --  548*   INR  --   --  1.4*   BUN 7   < > 5*   CREA 0.72   < > 0.86   K 3.6   < > 3.5    < > = values in this interval not displayed. Plan of Care/Planned Procedure:  Risks, benefits, and alternatives reviewed with patient and she agrees to proceed with the procedure. Conscious sedation will be performed with IV fentanyl and versed.  Plan is for CT guided pelvic fluid drainage       Gaila Snellen, MD

## 2022-02-14 NOTE — PROGRESS NOTES
TRANSFER - OUT REPORT:    Verbal report given to Sienna Beebe on [facility]  being transferred to [facility] for routine progression of care   Report consisted of patients Situation, Background, Assessment and   Recommendations(SBAR). LOS   RUR 8%(low readmission risk score)  LOS 9 days,GLOS 2.3     Pt was transferred to ICU on 2/13/22 -RRT for afib with RVR. Pt received iv  diltaizem and converted to SR overnight. Pt had drainage of her pelvic abscess on Friday. Pt had right total hip arthroplasy 1/31/22   Pt has rolling walker. Current plans are for outpatient PT  and return to home with family assistance .     Denys Yang  [phone]

## 2022-02-14 NOTE — PROGRESS NOTES
1900- received report from [facility], medication, plan of care. 2000- assessment completed, pt  at bedside. She had no complaints, watching super bowel in no distress. 2025- assessed pt to bedside commode, small amount of pain in right hip from hip surgery declines any prn meds. Once completed assisted back in bed.     2225-pt  left for the evening, she has requested the light off, has no c/o of pain, hr 108 /59 (74) O2 92 RM.     0002- reassess, pt asymptomatic no complaints. 0100- Pt called to go to bathroom, wanted to recheck temp and  pt temp has increased to 99.2. Contacted on call general surgery at [phone] to get an order for PO tylenol, only order in the system is rectal and pt refused. Spoke with Dr Kyra Morales and order given for PO PRN Q 4 hours. Pt denies any c/o no distress resting in bed. See mar. 0422- Pt converted to SR per ECG monitor. See mar     5171- pt taken to bathroom. Asymptomatic. See mar for medication changes. 1888- Bedside and Verbal shift change report given to Caitlin CADET  (oncoming nurse) by Lauren Rinne, RN (offgoing nurse). Report included the following information SBAR, Kardex, MAR and Recent Results.

## 2022-02-14 NOTE — PROGRESS NOTES
TRANSFER - IN REPORT:    Verbal report received from Sayda Jovel (name) on Nani Murray (patient name) being transferred to [facility]CC(unit) for routine progression of care   Report consisted of patients Situation, Background, Assessment and   Recommendations(SBAR). Transition of Care Plan from Sayda Jovel (name)    RUR 8% (Score %) low   Is This a Readmission NO  Is this a Bundle NO    1. Pelvic abscess drained  2. R hip replacement 1/31 (has RW)  3. Gastric perforation  4. Plan to d/c to home with family assistance & OP  tx  5. New A-fib with RVR- now converted  LANCE Andrews

## 2022-02-14 NOTE — PROGRESS NOTES
Pt seen on evening, transferred to the ICU / step-down for A Fib with RVR. Stable and resting. Incision WH, no drainage noted. No fluid collection or swelling. Benign exam and normal rotation. Will reschedule her clinical f/u in 1 week.

## 2022-02-14 NOTE — PROGRESS NOTES
[facility] Progress Note           Miroslava Armstrong DO    [phone] Office  [phone]  Fax    2022      Assessment & Plan:   · Suspected pelvic abscess. IR to attempt drainage today  If IR is unable to drain pelvic collection plan discharge on ertapenem x4 weeks. Continue fluconazole. Check CBC and CRP in a.m. · Right hip fluid collection  Thought to be expected postoperative changes per orthopedic surgery. No intervention planned   · Leukocytosis. Down slightly today. Antibiotics adjusted and plans for drainage of pelvic collection as above. Follow trend   · Pulmonary embolus. May be contributing to leukocytosis. Anticoagulation per team  · LLL airspace disease. Patient asymptomatic covered for possible aspiration on current antibiotics  ·   Perforated gastric ulcer status post open repair with Penelope Wayne patch 2022  · Recent right TKA 2022  · Anemia. Work-up by primary team          Subjective:     Developed A. fib with RVR over the weekend    Objective:     Vitals:   Visit Vitals  BP (!) 114/57 (BP 1 Location: Right upper arm, BP Patient Position: At rest)   Pulse 99   Temp 97.8 °F (36.6 °C)   Resp 18   Ht 5' 6\" (1.676 m)   Wt 224 lb (101.6 kg)   SpO2 97%   BMI 36.15 kg/m²        Tmax:  Temp (24hrs), Av.3 °F (36.8 °C), Min:97.6 °F (36.4 °C), Max:99.2 °F (37.3 °C)      Exam:   Patient is intubated:  no    Physical Examination:   General:  Alert, cooperative, no distress   Head:  Normocephalic, atraumatic. Eyes:  Conjunctivae clear   Neck: Supple       Lungs:   No distress. Chest wall:     Heart:     Abdomen:    Nondistended. HERIBERTO with serous drainage   Extremities: Moves all. Skin:  No rash   Neurologic: CNII-XII intact. Normal strength     Labs:        No lab exists for component: ITNL   No results for input(s): CPK, CKMB, TROIQ in the last 72 hours.   Recent Labs     22  0339 22  1241 22  0117 22  0948 22  0948   NA 142 141 141   < > 142   K 3.6 3.5 3.3*   < > 3.6    106 108   < > 111*   CO2 26 28 26   < > 27   BUN 7 5* 4*   < > 4*   CREA 0.72 0.86 0.71   < > 0.72   * 109* 100   < > 115*   MG  --  2.0  --   --   --    ALB  --  2.2*  --   --   --    WBC  --  11.2* 10.4  --  10.0   HGB  --  8.5* 7.6*  --  7.8*   HCT  --  26.2* 23.0*  --  23.4*   PLT  --  548* 504*  --  470*    < > = values in this interval not displayed. Recent Labs     02/13/22  1241   INR 1.4*   PTP 14.3*   APTT 23.5     Needs: urine analysis, urine sodium, protein and creatinine  No results found for: SHAYLEE, CREAU      Cultures:     No results found for: SDES  Lab Results   Component Value Date/Time    Culture result: NO GROWTH 5 DAYS 02/04/2022 02:55 PM    Culture result: MRSA NOT PRESENT 01/24/2022 11:13 AM    Culture result:  01/24/2022 11:13 AM     Screening of patient nares for MRSA is for surveillance purposes and, if positive, to facilitate isolation considerations in high risk settings. It is not intended for automatic decolonization interventions per se as regimens are not sufficiently effective to warrant routine use.        Radiology:     Medications       Current Facility-Administered Medications   Medication Dose Route Frequency Last Admin    acetaminophen (TYLENOL) tablet 650 mg  650 mg Oral Q4H  mg at 02/14/22 0138    [Held by provider] enoxaparin (LOVENOX) injection 90 mg  90 mg SubCUTAneous Q12H      midazolam (VERSED) injection 5 mg  5 mg IntraVENous RAD PRN      fentaNYL citrate (PF) injection 100 mcg  100 mcg IntraVENous RAD PRN      pantoprazole (PROTONIX) tablet 40 mg  40 mg Oral Q12H      hydrALAZINE (APRESOLINE) 20 mg/mL injection 20 mg  20 mg IntraVENous Q6H PRN 20 mg at 02/13/22 0101    furosemide (LASIX) injection 40 mg  40 mg IntraVENous BID 40 mg at 02/14/22 0818    dilTIAZem (CARDIZEM) 100 mg in 0.9% sodium chloride (MBP/ADV) 100 mL infusion  0-15 mg/hr IntraVENous TITRATE Stopped at 02/14/22 0758    ALPRAZolam (XANAX) tablet 0.25 mg  0.25 mg Oral Q8H PRN 0.25 mg at 02/13/22 1201    ertapenem (INVANZ) 1 g in 0.9% sodium chloride (MBP/ADV) 50 mL MBP  1 g IntraVENous DAILY 1 g at 02/14/22 1234    oxyCODONE IR (ROXICODONE) tablet 5 mg  5 mg Oral Q4H PRN      morphine injection 2 mg  2 mg IntraVENous Q3H PRN      acetaminophen (TYLENOL) suppository 650 mg  650 mg Rectal Q6H  mg at 02/06/22 0117    0.9% sodium chloride infusion 250 mL  250 mL IntraVENous PRN      LORazepam (ATIVAN) injection 0.5 mg  0.5 mg IntraVENous Q12H PRN 0.5 mg at 02/06/22 0117    fluconazole (DIFLUCAN) 400mg/200 mL IVPB (premix)  400 mg IntraVENous Q24H 400 mg at 02/13/22 2130           Case discussed with: General surgery      Arley Bower, DO

## 2022-02-14 NOTE — PROGRESS NOTES
General Surgery Daily Progress Note    Patient: Rodrigo Acosta MRN: [id number]  SSN: [id number]    YOB: 1955  Age: 75 y.o. Sex: female      Admit Date: 2/4/2022    POD 10 Days Post-Op    Procedure: Procedure(s):  LAPAROSCOPY GENERAL DIAGNOSTIC, CONVERTED TO OPEN, LYSIS OF ADHESIONS, OVERSEWED PERFORATED ULCER    Subjective:   Weekend events noted  Patient out of ICU and in 327  CT done today and shows pelvic collection is unchanged    Current Facility-Administered Medications   Medication Dose Route Frequency    acetaminophen (TYLENOL) tablet 650 mg  650 mg Oral Q4H PRN    [Held by provider] enoxaparin (LOVENOX) injection 90 mg  90 mg SubCUTAneous Q12H    midazolam (VERSED) injection 5 mg  5 mg IntraVENous RAD PRN    fentaNYL citrate (PF) injection 100 mcg  100 mcg IntraVENous RAD PRN    pantoprazole (PROTONIX) tablet 40 mg  40 mg Oral Q12H    hydrALAZINE (APRESOLINE) 20 mg/mL injection 20 mg  20 mg IntraVENous Q6H PRN    furosemide (LASIX) injection 40 mg  40 mg IntraVENous BID    dilTIAZem (CARDIZEM) 100 mg in 0.9% sodium chloride (MBP/ADV) 100 mL infusion  0-15 mg/hr IntraVENous TITRATE    ALPRAZolam (XANAX) tablet 0.25 mg  0.25 mg Oral Q8H PRN    ertapenem (INVANZ) 1 g in 0.9% sodium chloride (MBP/ADV) 50 mL MBP  1 g IntraVENous DAILY    oxyCODONE IR (ROXICODONE) tablet 5 mg  5 mg Oral Q4H PRN    morphine injection 2 mg  2 mg IntraVENous Q3H PRN    acetaminophen (TYLENOL) suppository 650 mg  650 mg Rectal Q6H PRN    0.9% sodium chloride infusion 250 mL  250 mL IntraVENous PRN    LORazepam (ATIVAN) injection 0.5 mg  0.5 mg IntraVENous Q12H PRN    fluconazole (DIFLUCAN) 400mg/200 mL IVPB (premix)  400 mg IntraVENous Q24H        Objective:   02/14 0701 - 02/14 1900  In: 46.7 [I.V.:46.7]  Out: -   02/12 1901 - 02/14 0700  In: 685 [P.O.:240;  I.V.:405]  Out: 54 [Drains:55]  Patient Vitals for the past 8 hrs:   BP Temp Pulse Resp SpO2 Height Weight   02/14/22 1232 (!) 114/57 97.8 °F (36.6 °C) 99 18 97 % -- --   02/14/22 1157 118/61 -- -- -- -- 5' 6\" (1.676 m) 101.6 kg (224 lb)   02/14/22 1136 118/61 -- -- -- -- 5' 6\" (1.676 m) 92.1 kg (203 lb)   02/14/22 0934 118/61 97.8 °F (36.6 °C) 91 19 95 % -- --   02/14/22 0732 -- -- -- -- -- -- 92.2 kg (203 lb 4.2 oz)   02/14/22 0730 (!) 128/55 97.6 °F (36.4 °C) 94 23 93 % -- --   02/14/22 0701 -- -- 87 -- -- -- --   02/14/22 0602 (!) 122/54 -- 86 25 92 % -- --       Physical Exam:  General: Alert, cooperative, NAD  Lungs: Unlabored  Abdomen: Soft, Non-tender, incisions healing well. HERIBERTO serous. Extremities: Warm, moves all, no edema. RLE incision healing well  Skin:  Warm and dry, no rash    Labs:   Recent Labs     02/13/22  1241   WBC 11.2*   HGB 8.5*   HCT 26.2*   *     Recent Labs     02/14/22  0339 02/13/22  1241 02/13/22  1241      < > 141   K 3.6   < > 3.5      < > 106   CO2 26   < > 28   *   < > 109*   BUN 7   < > 5*   CREA 0.72   < > 0.86   CA 7.8*   < > 8.2*   MG  --   --  2.0   ALB  --   --  2.2*   TBILI  --   --  0.3   ALT  --   --  30    < > = values in this interval not displayed.        Assessment / Plan:     POD 10 Days Post-Op    Procedure: Procedure(s):  LAPAROSCOPY GENERAL DIAGNOSTIC, CONVERTED TO OPEN, LYSIS OF ADHESIONS, OVERSEWED PERFORATED ULCER    Spoke with IR about attempting to drain collection and they are going to try  Will d/c HERIBERTO drain, communicated this with nurse  WiIl change IV PPI to oral PPI      Abdirahman Sarmiento MD

## 2022-02-15 PROBLEM — I26.99 OTHER PULMONARY EMBOLISM WITHOUT ACUTE COR PULMONALE (HCC): Status: ACTIVE | Noted: 2022-02-15

## 2022-02-15 PROBLEM — D64.9 NORMOCYTIC ANEMIA: Status: ACTIVE | Noted: 2022-02-15

## 2022-02-15 LAB
ANION GAP SERPL CALC-SCNC: 7 MMOL/L (ref 5–15)
BASOPHILS # BLD: 0 K/UL (ref 0–0.1)
BASOPHILS NFR BLD: 0 % (ref 0–1)
BUN SERPL-MCNC: 9 MG/DL (ref 6–20)
BUN/CREAT SERPL: 13 (ref 12–20)
CALCIUM SERPL-MCNC: 7.7 MG/DL (ref 8.5–10.1)
CHLORIDE SERPL-SCNC: 108 MMOL/L (ref 97–108)
CO2 SERPL-SCNC: 27 MMOL/L (ref 21–32)
CREAT SERPL-MCNC: 0.67 MG/DL (ref 0.55–1.02)
CRP SERPL-MCNC: 10.7 MG/DL (ref 0–0.6)
DIFFERENTIAL METHOD BLD: ABNORMAL
EOSINOPHIL # BLD: 0.1 K/UL (ref 0–0.4)
EOSINOPHIL NFR BLD: 1 % (ref 0–7)
ERYTHROCYTE [DISTWIDTH] IN BLOOD BY AUTOMATED COUNT: 13.2 % (ref 11.5–14.5)
FERRITIN SERPL-MCNC: 175 NG/ML (ref 8–252)
GLUCOSE SERPL-MCNC: 85 MG/DL (ref 65–100)
HCT VFR BLD AUTO: 21.7 % (ref 35–47)
HCT VFR BLD AUTO: 26.5 % (ref 35–47)
HGB BLD-MCNC: 6.9 G/DL (ref 11.5–16)
HGB BLD-MCNC: 8.7 G/DL (ref 11.5–16)
HISTORY CHECKED?,CKHIST: NORMAL
IMM GRANULOCYTES # BLD AUTO: 0.1 K/UL (ref 0–0.04)
IMM GRANULOCYTES NFR BLD AUTO: 1 % (ref 0–0.5)
LYMPHOCYTES # BLD: 1.8 K/UL (ref 0.8–3.5)
LYMPHOCYTES NFR BLD: 19 % (ref 12–49)
MCH RBC QN AUTO: 30.1 PG (ref 26–34)
MCHC RBC AUTO-ENTMCNC: 31.8 G/DL (ref 30–36.5)
MCV RBC AUTO: 94.8 FL (ref 80–99)
MONOCYTES # BLD: 0.6 K/UL (ref 0–1)
MONOCYTES NFR BLD: 6 % (ref 5–13)
NEUTS SEG # BLD: 6.9 K/UL (ref 1.8–8)
NEUTS SEG NFR BLD: 73 % (ref 32–75)
NRBC # BLD: 0 K/UL (ref 0–0.01)
NRBC BLD-RTO: 0 PER 100 WBC
PLATELET # BLD AUTO: 549 K/UL (ref 150–400)
PMV BLD AUTO: 9.6 FL (ref 8.9–12.9)
POTASSIUM SERPL-SCNC: 3.4 MMOL/L (ref 3.5–5.1)
RBC # BLD AUTO: 2.29 M/UL (ref 3.8–5.2)
RBC MORPH BLD: ABNORMAL
RETICS # AUTO: 0.08 M/UL (ref 0.02–0.08)
RETICS/RBC NFR AUTO: 3.2 % (ref 0.7–2.1)
SODIUM SERPL-SCNC: 142 MMOL/L (ref 136–145)
WBC # BLD AUTO: 9.5 K/UL (ref 3.6–11)

## 2022-02-15 PROCEDURE — 74011250637 HC RX REV CODE- 250/637: Performed by: NURSE PRACTITIONER

## 2022-02-15 PROCEDURE — 74011250636 HC RX REV CODE- 250/636: Performed by: INTERNAL MEDICINE

## 2022-02-15 PROCEDURE — 86900 BLOOD TYPING SEROLOGIC ABO: CPT

## 2022-02-15 PROCEDURE — 74011250636 HC RX REV CODE- 250/636: Performed by: HOSPITALIST

## 2022-02-15 PROCEDURE — 74011000258 HC RX REV CODE- 258: Performed by: INTERNAL MEDICINE

## 2022-02-15 PROCEDURE — 82728 ASSAY OF FERRITIN: CPT

## 2022-02-15 PROCEDURE — 74011250636 HC RX REV CODE- 250/636: Performed by: NURSE PRACTITIONER

## 2022-02-15 PROCEDURE — 97116 GAIT TRAINING THERAPY: CPT

## 2022-02-15 PROCEDURE — 80048 BASIC METABOLIC PNL TOTAL CA: CPT

## 2022-02-15 PROCEDURE — P9016 RBC LEUKOCYTES REDUCED: HCPCS

## 2022-02-15 PROCEDURE — 74011250636 HC RX REV CODE- 250/636: Performed by: SURGERY

## 2022-02-15 PROCEDURE — 36430 TRANSFUSION BLD/BLD COMPNT: CPT

## 2022-02-15 PROCEDURE — 86140 C-REACTIVE PROTEIN: CPT

## 2022-02-15 PROCEDURE — 65660000000 HC RM CCU STEPDOWN

## 2022-02-15 PROCEDURE — 85018 HEMOGLOBIN: CPT

## 2022-02-15 PROCEDURE — 99232 SBSQ HOSP IP/OBS MODERATE 35: CPT | Performed by: INTERNAL MEDICINE

## 2022-02-15 PROCEDURE — 74011250637 HC RX REV CODE- 250/637: Performed by: INTERNAL MEDICINE

## 2022-02-15 PROCEDURE — 85045 AUTOMATED RETICULOCYTE COUNT: CPT

## 2022-02-15 PROCEDURE — 86923 COMPATIBILITY TEST ELECTRIC: CPT

## 2022-02-15 PROCEDURE — 85025 COMPLETE CBC W/AUTO DIFF WBC: CPT

## 2022-02-15 PROCEDURE — 74011000258 HC RX REV CODE- 258: Performed by: NURSE PRACTITIONER

## 2022-02-15 PROCEDURE — 36415 COLL VENOUS BLD VENIPUNCTURE: CPT

## 2022-02-15 RX ORDER — SODIUM CHLORIDE 9 MG/ML
250 INJECTION, SOLUTION INTRAVENOUS AS NEEDED
Status: DISCONTINUED | OUTPATIENT
Start: 2022-02-15 | End: 2022-02-17 | Stop reason: HOSPADM

## 2022-02-15 RX ADMIN — PANTOPRAZOLE SODIUM 40 MG: 40 TABLET, DELAYED RELEASE ORAL at 08:38

## 2022-02-15 RX ADMIN — FUROSEMIDE 40 MG: 10 INJECTION, SOLUTION INTRAMUSCULAR; INTRAVENOUS at 12:03

## 2022-02-15 RX ADMIN — ENOXAPARIN SODIUM 100 MG: 100 INJECTION SUBCUTANEOUS at 00:23

## 2022-02-15 RX ADMIN — PANTOPRAZOLE SODIUM 40 MG: 40 TABLET, DELAYED RELEASE ORAL at 21:04

## 2022-02-15 RX ADMIN — ENOXAPARIN SODIUM 100 MG: 100 INJECTION SUBCUTANEOUS at 13:20

## 2022-02-15 RX ADMIN — FUROSEMIDE 40 MG: 10 INJECTION, SOLUTION INTRAMUSCULAR; INTRAVENOUS at 08:37

## 2022-02-15 RX ADMIN — IRON SUCROSE 200 MG: 20 INJECTION, SOLUTION INTRAVENOUS at 16:13

## 2022-02-15 RX ADMIN — DILTIAZEM HYDROCHLORIDE 30 MG: 30 TABLET, FILM COATED ORAL at 12:02

## 2022-02-15 RX ADMIN — DILTIAZEM HYDROCHLORIDE 30 MG: 30 TABLET, FILM COATED ORAL at 00:22

## 2022-02-15 RX ADMIN — FLUCONAZOLE 400 MG: 2 INJECTION, SOLUTION INTRAVENOUS at 21:05

## 2022-02-15 RX ADMIN — DILTIAZEM HYDROCHLORIDE 30 MG: 30 TABLET, FILM COATED ORAL at 06:48

## 2022-02-15 RX ADMIN — PIPERACILLIN AND TAZOBACTAM 3.38 G: 3; .375 INJECTION, POWDER, LYOPHILIZED, FOR SOLUTION INTRAVENOUS at 12:01

## 2022-02-15 RX ADMIN — DILTIAZEM HYDROCHLORIDE 30 MG: 30 TABLET, FILM COATED ORAL at 17:36

## 2022-02-15 RX ADMIN — PIPERACILLIN AND TAZOBACTAM 3.38 G: 3; .375 INJECTION, POWDER, LYOPHILIZED, FOR SOLUTION INTRAVENOUS at 21:04

## 2022-02-15 RX ADMIN — ERTAPENEM SODIUM 1 G: 1 INJECTION, POWDER, LYOPHILIZED, FOR SOLUTION INTRAMUSCULAR; INTRAVENOUS at 08:57

## 2022-02-15 NOTE — PROGRESS NOTES
Transition of Care Plan: RUR-8%  1. Transfuse prn  2. PT/OT consults  3. ID following; on IV abx  4. Ortho following: R hip replacement 1/31; pelvic abscess drained, 2/14  5. OP f/u   6. A-fib with RVR, now converted  7. Home with family  8. CM following  LANCE Mulligan

## 2022-02-15 NOTE — PROGRESS NOTES
Md stuart sent a page by the nurse stating \" patient had a pigtail drain placed this afternoon and per day shift Nurse md told dayshift nurse to restart Lovenox after procedure, but the order is still on auto held. Can you restart lovenex please? \"

## 2022-02-15 NOTE — PROGRESS NOTES
Problem: Pressure Injury - Risk of  Goal: *Prevention of pressure injury  Description: Document Chase Scale and appropriate interventions in the flowsheet. Outcome: Progressing Towards Goal  Note: Pressure Injury Interventions:  Sensory Interventions: Float heels,Turn and reposition approx. every two hours (pillows and wedges if needed)    Moisture Interventions: Absorbent underpads    Activity Interventions: Increase time out of bed,Pressure redistribution bed/mattress(bed type),PT/OT evaluation    Mobility Interventions: Assess need for specialty bed,HOB 30 degrees or less,Pressure redistribution bed/mattress (bed type),PT/OT evaluation,Turn and reposition approx. every two hours(pillow and wedges)    Nutrition Interventions: Document food/fluid/supplement intake    Friction and Shear Interventions: Apply protective barrier, creams and emollients,Minimize layers                Problem: Patient Education: Go to Patient Education Activity  Goal: Patient/Family Education  Outcome: Progressing Towards Goal     Problem: Falls - Risk of  Goal: *Absence of Falls  Description: Document Karl Fall Risk and appropriate interventions in the flowsheet.   Outcome: Progressing Towards Goal  Note: Fall Risk Interventions:  Mobility Interventions: Bed/chair exit alarm,Communicate number of staff needed for ambulation/transfer,OT consult for ADLs,PT Consult for mobility concerns,Patient to call before getting OOB,Strengthening exercises (ROM-active/passive)         Medication Interventions: Bed/chair exit alarm,Teach patient to arise slowly,Patient to call before getting OOB    Elimination Interventions: Bed/chair exit alarm,Call light in reach,Patient to call for help with toileting needs,Stay With Me (per policy),Toileting schedule/hourly rounds              Problem: Patient Education: Go to Patient Education Activity  Goal: Patient/Family Education  Outcome: Progressing Towards Goal     Problem: Patient Education: Go to Patient Education Activity  Goal: Patient/Family Education  Outcome: Progressing Towards Goal     Problem: Pain  Goal: *Control of Pain  Outcome: Progressing Towards Goal     Problem: Constipation - Risk of  Goal: *Prevention of constipation  Outcome: Progressing Towards Goal     Problem: Patient Education: Go to Patient Education Activity  Goal: Patient/Family Education  Outcome: Progressing Towards Goal

## 2022-02-15 NOTE — PROGRESS NOTES
[address] [place] [address] [facility] Progress Note           Jose Carlos Leon DO    [phone] Office  [phone]  Fax    2/15/2022      Assessment & Plan:   · Suspected pelvic abscess. Status post IR drainage 2022. Preliminary cultures showing yeast on Gram stain. Continue fluconazole. Ertapenem is nonformulary and pharmacy has limited supply so will change antibiotics back to piperacillin-tazobactam pending further culture data  · Right hip fluid collection  Thought to be expected postoperative changes per orthopedic surgery. No intervention planned   · Leukocytosis. Resolved. Follow trend    · Pulmonary embolus. Management per primary team   · LLL airspace disease. Patient asymptomatic covered for possible aspiration on current antibiotics  ·   Perforated gastric ulcer status post open repair with [place] patch 2022  · Recent right TKA 2022  · Anemia. Hematology following          Subjective:     Complaining of pain at pelvic drain site    Objective:     Vitals:   Visit Vitals  /61 (BP 1 Location: Left arm, BP Patient Position: At rest)   Pulse (!) 105   Temp 98 °F (36.7 °C)   Resp 16   Ht 5' 6\" (1.676 m)   Wt 224 lb (101.6 kg)   SpO2 96%   BMI 36.15 kg/m²        Tmax:  Temp (24hrs), Av.6 °F (37 °C), Min:97.8 °F (36.6 °C), Max:99.3 °F (37.4 °C)      Exam:   Patient is intubated:  no    Physical Examination:   General:  Alert, cooperative, no distress   Head:  Normocephalic, atraumatic. Eyes:  Conjunctivae clear   Neck: Supple       Lungs:   No distress. Chest wall:     Heart:     Abdomen:    Nondistended. Pelvic drain with scant serous fluid   Extremities: Moves all. Skin:  No rash   Neurologic: CNII-XII intact. Normal strength     Labs:        No lab exists for component: ITNL   No results for input(s): CPK, CKMB, TROIQ in the last 72 hours.   Recent Labs     02/15/22  0021 22  0339 22  1241 22  0117 22  0117    142 141   < > 141   K 3.4* 3.6 3.5   < > 3.3*    108 106   < > 108   CO2 27 26 28   < > 26   BUN 9 7 5*   < > 4*   CREA 0.67 0.72 0.86   < > 0.71   GLU 85 111* 109*   < > 100   MG  --   --  2.0  --   --    ALB  --   --  2.2*  --   --    WBC 9.5  --  11.2*  --  10.4   HGB 6.9*  --  8.5*  --  7.6*   HCT 21.7*  --  26.2*  --  23.0*   *  --  548*  --  504*    < > = values in this interval not displayed. Recent Labs     02/13/22  1241   INR 1.4*   PTP 14.3*   APTT 23.5     Needs: urine analysis, urine sodium, protein and creatinine  No results found for: SHAYLEE, CREAU      Cultures:     No results found for: SDES  Lab Results   Component Value Date/Time    Culture result: PENDING 02/14/2022 03:30 PM    Culture result: NO GROWTH 5 DAYS 02/04/2022 02:55 PM    Culture result: MRSA NOT PRESENT 01/24/2022 11:13 AM    Culture result:  01/24/2022 11:13 AM     Screening of patient nares for MRSA is for surveillance purposes and, if positive, to facilitate isolation considerations in high risk settings. It is not intended for automatic decolonization interventions per se as regimens are not sufficiently effective to warrant routine use.        Radiology:     Medications       Current Facility-Administered Medications   Medication Dose Route Frequency Last Admin    0.9% sodium chloride infusion 250 mL  250 mL IntraVENous PRN      acetaminophen (TYLENOL) tablet 650 mg  650 mg Oral Q4H  mg at 02/14/22 0138    pantoprazole (PROTONIX) tablet 40 mg  40 mg Oral Q12H 40 mg at 02/15/22 0838    dilTIAZem IR (CARDIZEM) tablet 30 mg  30 mg Oral Q6H 30 mg at 02/15/22 0648    enoxaparin (LOVENOX) injection 100 mg  100 mg SubCUTAneous Q12H 100 mg at 02/15/22 0023    hydrALAZINE (APRESOLINE) 20 mg/mL injection 20 mg  20 mg IntraVENous Q6H PRN 20 mg at 02/13/22 0101    furosemide (LASIX) injection 40 mg  40 mg IntraVENous BID 40 mg at 02/15/22 0837    dilTIAZem (CARDIZEM) 100 mg in 0.9% sodium chloride (MBP/ADV) 100 mL infusion  0-15 mg/hr IntraVENous TITRATE Stopped at 02/14/22 0728    ALPRAZolam (XANAX) tablet 0.25 mg  0.25 mg Oral Q8H PRN 0.25 mg at 02/13/22 1201    ertapenem (INVANZ) 1 g in 0.9% sodium chloride (MBP/ADV) 50 mL MBP  1 g IntraVENous DAILY 1 g at 02/15/22 0857    oxyCODONE IR (ROXICODONE) tablet 5 mg  5 mg Oral Q4H PRN      morphine injection 2 mg  2 mg IntraVENous Q3H PRN      acetaminophen (TYLENOL) suppository 650 mg  650 mg Rectal Q6H  mg at 02/06/22 0117    0.9% sodium chloride infusion 250 mL  250 mL IntraVENous PRN      LORazepam (ATIVAN) injection 0.5 mg  0.5 mg IntraVENous Q12H PRN 0.5 mg at 02/06/22 0117    fluconazole (DIFLUCAN) 400mg/200 mL IVPB (premix)  400 mg IntraVENous Q24H 400 mg at 02/14/22 2140           Case discussed with: General surgery      Aurora Sylvester DO

## 2022-02-15 NOTE — PROGRESS NOTES
General Surgery Daily Progress Note    Patient: Dot Adrian MRN: [id number]  SSN: [id number]    YOB: 1955  Age: 75 y.o.   Sex: female      Admit Date: 2/4/2022    POD 11 Days Post-Op    Procedure: Procedure(s):  LAPAROSCOPY GENERAL DIAGNOSTIC, CONVERTED TO OPEN, LYSIS OF ADHESIONS, OVERSEWED PERFORATED ULCER    Subjective:     Pt feeling well from abdominal standpoint  Pt irritated by drain placement but no sig pain  Pt tolerating diet without n/v/reflux  Denies palpitations, denies chest pain, denies SOB  Up and OOB frequently      Current Facility-Administered Medications   Medication Dose Route Frequency    0.9% sodium chloride infusion 250 mL  250 mL IntraVENous PRN    acetaminophen (TYLENOL) tablet 650 mg  650 mg Oral Q4H PRN    pantoprazole (PROTONIX) tablet 40 mg  40 mg Oral Q12H    dilTIAZem IR (CARDIZEM) tablet 30 mg  30 mg Oral Q6H    enoxaparin (LOVENOX) injection 100 mg  100 mg SubCUTAneous Q12H    hydrALAZINE (APRESOLINE) 20 mg/mL injection 20 mg  20 mg IntraVENous Q6H PRN    furosemide (LASIX) injection 40 mg  40 mg IntraVENous BID    dilTIAZem (CARDIZEM) 100 mg in 0.9% sodium chloride (MBP/ADV) 100 mL infusion  0-15 mg/hr IntraVENous TITRATE    ALPRAZolam (XANAX) tablet 0.25 mg  0.25 mg Oral Q8H PRN    ertapenem (INVANZ) 1 g in 0.9% sodium chloride (MBP/ADV) 50 mL MBP  1 g IntraVENous DAILY    oxyCODONE IR (ROXICODONE) tablet 5 mg  5 mg Oral Q4H PRN    morphine injection 2 mg  2 mg IntraVENous Q3H PRN    acetaminophen (TYLENOL) suppository 650 mg  650 mg Rectal Q6H PRN    0.9% sodium chloride infusion 250 mL  250 mL IntraVENous PRN    LORazepam (ATIVAN) injection 0.5 mg  0.5 mg IntraVENous Q12H PRN    fluconazole (DIFLUCAN) 400mg/200 mL IVPB (premix)  400 mg IntraVENous Q24H        Objective:   02/15 0701 - 02/15 1900  In: 312.5   Out: -   02/13 1901 - 02/15 0700  In: 903.4 [P.O.:480; I.V.:423.4]  Out: 45 [Drains:45]  Patient Vitals for the past 8 hrs:   BP Temp Pulse Resp SpO2   02/15/22 0842 139/61 98 °F (36.7 °C) (!) 105 16 96 %   02/15/22 0755 (!) 123/58 98.3 °F (36.8 °C) (!) 106 16 94 %   02/15/22 0648 (!) 129/56 98.7 °F (37.1 °C) (!) 104 18 --   02/15/22 0618 (!) 128/58 98.6 °F (37 °C) (!) 105 18 --   02/15/22 0603 (!) 110/55 98.6 °F (37 °C) (!) 104 -- --   02/15/22 0548 (!) 118/57 99.1 °F (37.3 °C) (!) 108 18 92 %   02/15/22 0327 (!) 121/58 99.3 °F (37.4 °C) (!) 108 18 92 %       Physical Exam:  General: Alert, cooperative, NAD  Lungs: Unlabored  Abdomen: Soft, Non-tender, incisions healing well. IR drain in place with minimal purulent fluid in line and drain. No blood noted in drain. IR drain not charged. Extremities: Warm, moves all, no edema. RLE incision healing well  Skin:  Warm and dry, no rash    Labs:   Recent Labs     02/15/22  0021   WBC 9.5   HGB 6.9*   HCT 21.7*   *     Recent Labs     02/15/22  0021 02/14/22  0339 02/13/22  1241      < > 141   K 3.4*   < > 3.5      < > 106   CO2 27   < > 28   GLU 85   < > 109*   BUN 9   < > 5*   CREA 0.67   < > 0.86   CA 7.7*   < > 8.2*   MG  --   --  2.0   ALB  --   --  2.2*   TBILI  --   --  0.3   ALT  --   --  30    < > = values in this interval not displayed.        Assessment / Plan:       Procedure: Procedure(s):  LAPAROSCOPY GENERAL DIAGNOSTIC, CONVERTED TO OPEN, LYSIS OF ADHESIONS, OVERSEWED PERFORATED ULCER      Pt feeling well overall  Hgb 6.9- transfused over night  IR drain in place- please make sure it is being flushed, charged, and output documented  Please put padding around IR drain to avoid skin breakdown  Restart lovenox  Cont PPI  Cultures prelim for yeast- discussed with Dr Armando Sifuentes- await final cultures  Anemia and anticoag for home d/c discussed with heme-onc- await recs  Cont frequent ambulation      Pt seen and discussed with Dr Albert Hernandez, NP

## 2022-02-15 NOTE — PROGRESS NOTES
Problem: Mobility Impaired (Adult and Pediatric)  Goal: *Acute Goals and Plan of Care (Insert Text)  Description: FUNCTIONAL STATUS PRIOR TO ADMISSION: Patient was modified independent using a rolling walker for functional mobility. HOME SUPPORT PRIOR TO ADMISSION: The patient lived with  and required minimal assistance/contact guard assist for some ADLs. bedroom on second floor, has a rolling walker, had anterior LAVERNE 1/31/2022    Physical Therapy Goals  Initiated 2/5/2022  1. Patient will move from supine to sit and sit to supine  in bed with minimal assistance/contact guard assist within 7 day(s). 2.  Patient will transfer from bed to chair and chair to bed with supervision/set-up using the least restrictive device within 7 day(s). 3.  Patient will perform sit to stand with modified independence within 7 day(s). 4.  Patient will ambulate with independence for 200 feet with the least restrictive device within 7 day(s). 5.  Patient will ascend/descend 13 stairs with handrail(s) with minimal assistance/contact guard assist within 7 day(s). Outcome: Progressing Towards Goal  Note:   PHYSICAL THERAPY REEVALUATION  Patient: Bee Lofton (76 y.o. female)  Date: 2/15/2022  Primary Diagnosis: Gastric ulcer [K25.9]  Procedure(s) (LRB):  LAPAROSCOPY GENERAL DIAGNOSTIC, CONVERTED TO OPEN, LYSIS OF ADHESIONS, OVERSEWED PERFORATED ULCER (N/A) 11 Days Post-Op   Precautions:   Fall      ASSESSMENT  Based on the objective data described below, the patient presents with following abcess and s/p lap choly. Patient with complex course, required ICU due to elevated heart rate requring drip. Patient underwent pigtail drain placement yesterday. Today received sitting up in chair, patient underwent 1/31/2022 Right LAVERNE. Patient overall SBA with RW. Vitals stable. Current Level of Function Impacting Discharge (mobility/balance): MOD I- SBA with RW    Functional Outcome Measure:   The patient scored 85/100 on the barthel index     Other factors to consider for discharge:      Patient will benefit from skilled therapy intervention to address the above noted impairments. PLAN :  Recommendations and Planned Interventions: bed mobility training, transfer training, gait training, therapeutic exercises, and therapeutic activities      Frequency/Duration: Patient will be followed by physical therapy:  5 times a week to address goals. Recommendation for discharge: (in order for the patient to meet his/her long term goals)  Outpatient physical therapy follow up recommended for LAVERNE    This discharge recommendation:  Has been made in collaboration with the attending provider and/or case management    Equipment recommendations for successful discharge (if) home: patient owns DME required for discharge         SUBJECTIVE:   Patient stated i have not been able to move much.     OBJECTIVE DATA SUMMARY:   HISTORY:    Past Medical History:   Diagnosis Date    Anxiety     COVID-19 vaccine administered     boostered   [employer]    White coat syndrome without diagnosis of hypertension      Past Surgical History:   Procedure Laterality Date    HX COLONOSCOPY  09/2021     [address]    [facility] course since last seen and reason for reevaluation: see above    Personal factors and/or comorbidities impacting plan of care:     Home Situation  Support Systems: Spouse/Significant Other  Patient Expects to be Discharged to[de-identified] Unable to determine at this time    EXAMINATION/PRESENTATION/DECISION MAKING:   Critical Behavior:  Neurologic State: Alert,Appropriate for age  Orientation Level: Oriented X4  Cognition: Appropriate decision making,Appropriate safety awareness,Follows commands    Range Of Motion:  AROM: Within functional limits           PROM: Within functional limits           Strength:    Strength:  Within functional limits                       Coordination:  Coordination: Within functional limits  Vision:      Functional Mobility:  Bed Mobility:              Transfers:  Sit to Stand: Modified independent  Stand to Sit: Modified independent        Bed to Chair: Supervision              Balance:      Ambulation/Gait Training:  Distance (ft): 250 Feet (ft)  Assistive Device: Walker, rolling;Gait belt  Ambulation - Level of Assistance: Supervision                                     Functional Measure:  Barthel Index:    Bathin  Bladder: 10  Bowels: 10  Groomin  Dressing: 10  Feeding: 10  Mobility: 10  Stairs: 5  Toilet Use: 10  Transfer (Bed to Chair and Back): 10  Total: 85/100                   Pain Rating:  None noted    Activity Tolerance:   Good    After treatment patient left in no apparent distress:   Sitting in chair, Call bell within reach, and Bed / chair alarm activated    COMMUNICATION/EDUCATION:   The patients plan of care was discussed with: Registered nurse. Fall prevention education was provided and the patient/caregiver indicated understanding., Patient/family have participated as able in goal setting and plan of care. , and Patient/family agree to work toward stated goals and plan of care.     Thank you for this referral.  Alexandra Domingo, PT, DPT   Time Calculation: 17 mins

## 2022-02-15 NOTE — PROGRESS NOTES
0700: Bedside and Verbal shift change report given to Michelle Meek (oncoming nurse) by Kenneth Sage RN (offgoing nurse). Report included the following information SBAR, Kardex, Accordion and Cardiac Rhythm NSR. This patient was assisted with Intentional Toileting every 2 hours during this shift as appropriate. Documentation of ambulation and output reflected on Flowsheet as appropriate. Purposeful hourly rounding was completed using AIDET and 5Ps. Outcomes of PHR documented as they occurred. Bed alarm in use as appropriate. Dual Suction and ambubag in place.

## 2022-02-15 NOTE — PROGRESS NOTES
Pharmacy changed dose of lovenox to 100 mg for weight of 101.6 kg, per anticoagulant protocol. Weight was changed in [address] [facility] after initially ordered.

## 2022-02-15 NOTE — PROGRESS NOTES
Douglas Fabian [facility] [address]  [address] [facility], [address] [place] [address]  [phone]      Medical Progress Note      NAME: Bowen Mondragon   :  1955  MRM:  [id number]    Date/Time: 2/15/2022         Assessment / Plan:     #AFwRVR: New onset. And now resolved. Likely multifactorial with volume overload, ongoing infection, and PE contributing. Responded well to diuresis and dilt gtt. Echo normal.               - Dilt to 30mg PO; could potentially dc if underlying pathologies improving              - Continue diuresis today, likely last day              - Buff lytes              - Cont apixaban              - Tx Infx as below              - Tx PE as below     #Pelvic abscess: Now s/p IR drainage . CRP climbing but vitals reassuring. Abx, antifungal per ID, surg                       - F/u Cx data     #Perforated Gastric Ulcer: s/p open repair with Chantell Bustos patch 22              - Fluconazole  -               - Abx as above              - IV PPI to PO              - Nutrition per Surgery     #R hip fluid collection: S/p hip LAVERNE. Ortho reviewed, low c/f infection. #Pulmonary Emboli: Noted on CT 2/10; right upper and lower lobes. - LMWh 1mg/kg; can transition to DOAC at dc; 3 months     #Anemia: Likely blood loss. Has been stable even on full dose LMWH              - Trend              - Transfuse Hb < 7     #Aspiratoin pna?: Noted on imaging; abx as above     #LAVERNE 22: As above         Will continue to follow with you.  ___________________________________________________    Attending Physician: Malena Amado MD        Subjective:     Chief Complaint:  [place]. Converted to NSR. Feels a little better; legs less swollen. To CT this morning and possible IR drainage. ROS:  (bold if positive, if negative)    Tolerating PT  Tolerating Diet          Objective:       Vitals:          Last 24hrs VS reviewed since prior progress note.  Most recent are:    Visit Vitals  /66 (BP 1 Location: Left upper arm, BP Patient Position: Sitting)   Pulse 95   Temp 98.2 °F (36.8 °C)   Resp 18   Ht 5' 6\" (1.676 m)   Wt 89.5 kg (197 lb 5 oz)   SpO2 96%   BMI 31.85 kg/m²     SpO2 Readings from Last 6 Encounters:   02/15/22 96%   01/31/22 100%   01/24/22 99%    O2 Flow Rate (L/min): 2 l/min       Intake/Output Summary (Last 24 hours) at 2/15/2022 1323  Last data filed at 2/15/2022 8195  Gross per 24 hour   Intake 792.5 ml   Output 45 ml   Net 747.5 ml          Exam:     Physical Exam:    Gen:  Well-developed, well-nourished, in no acute distress  HEENT:  Pink conjunctivae, PERRL, hearing intact to voice, moist mucous membranes  Neck:  Supple, without masses, thyroid non-tender  Resp:  No accessory muscle use, clear breath sounds without wheezes rales or rhonchi  Card:  No murmurs, normal S1, S2 without thrills, bruits 1+ peripheral edema  Abd:  Soft, non-tender, non-distended, normoactive bowel sounds are present  Musc:  No cyanosis or clubbing  Skin:  No rashes or ulcers, skin turgor is good  Neuro:  Cranial nerves 3-12 are grossly intact,  strength is 5/5 bilaterally and dorsi / plantarflexion is 5/5 bilaterally, follows commands appropriately  Psych:  Good insight, oriented to person, place and time, alert    Medications Reviewed: (see below)    Lab Data Reviewed: (see below)    ______________________________________________________________________    Medications:     Current Facility-Administered Medications   Medication Dose Route Frequency    0.9% sodium chloride infusion 250 mL  250 mL IntraVENous PRN    piperacillin-tazobactam (ZOSYN) 3.375 g in 0.9% sodium chloride (MBP/ADV) 100 mL MBP  3.375 g IntraVENous Q8H    acetaminophen (TYLENOL) tablet 650 mg  650 mg Oral Q4H PRN    pantoprazole (PROTONIX) tablet 40 mg  40 mg Oral Q12H    dilTIAZem IR (CARDIZEM) tablet 30 mg  30 mg Oral Q6H    enoxaparin (LOVENOX) injection 100 mg  100 mg SubCUTAneous Q12H    hydrALAZINE (APRESOLINE) 20 mg/mL injection 20 mg  20 mg IntraVENous Q6H PRN    furosemide (LASIX) injection 40 mg  40 mg IntraVENous BID    ALPRAZolam (XANAX) tablet 0.25 mg  0.25 mg Oral Q8H PRN    oxyCODONE IR (ROXICODONE) tablet 5 mg  5 mg Oral Q4H PRN    morphine injection 2 mg  2 mg IntraVENous Q3H PRN    acetaminophen (TYLENOL) suppository 650 mg  650 mg Rectal Q6H PRN    0.9% sodium chloride infusion 250 mL  250 mL IntraVENous PRN    LORazepam (ATIVAN) injection 0.5 mg  0.5 mg IntraVENous Q12H PRN    fluconazole (DIFLUCAN) 400mg/200 mL IVPB (premix)  400 mg IntraVENous Q24H            Lab Review:     Recent Labs     02/15/22  1034 02/15/22  0021 02/13/22  1241 02/13/22  0117 02/13/22  0117   WBC  --  9.5 11.2*  --  10.4   HGB 8.7* 6.9* 8.5*   < > 7.6*   HCT 26.5* 21.7* 26.2*   < > 23.0*   PLT  --  549* 548*  --  504*    < > = values in this interval not displayed.      Recent Labs     02/15/22  0021 02/14/22  0339 02/13/22  1241    142 141   K 3.4* 3.6 3.5    108 106   CO2 27 26 28   GLU 85 111* 109*   BUN 9 7 5*   CREA 0.67 0.72 0.86   CA 7.7* 7.8* 8.2*   MG  --   --  2.0   ALB  --   --  2.2*   ALT  --   --  30   INR  --   --  1.4*     No components found for: [place]

## 2022-02-15 NOTE — PROGRESS NOTES
Comprehensive Nutrition Assessment    Type and Reason for Visit: Reassess    Nutrition Recommendations/Plan:   1. Continue regular - pt able to order meals as desired    Nutrition Assessment:      2/15: follow up. RD visited,  at bedside. Pt is eating well and self-restricting potentially irritating foods & beverages as needed (tomato, coffee, citrus). Provided menu & instructed how to order meals as needed. Requested standing weight since last documented appears inaccurate. New wt is near UBW. No other needs or questions regarding nutrition when asked. Pt is a 75year old female admitted with Gastric ulcer [K25.9]. She  has a past medical history of Anxiety, COVID-19 vaccine administered, and White coat syndrome without diagnosis of hypertension. Noted 5# (2.5%) weight loss x 2 weeks, not clinically significant for timeframe but being monitored. PO averaging 25% on liquid diet. Diet just advanced to GI [place]. Patient voicing some hunger, looking forward to food. Currently on 2L O2 per flowsheetes. Has had BM. NKFA. No complaints at this time. Declined protein supplement at this time.      Wt Readings from Last 10 Encounters:   02/15/22 89.5 kg (197 lb 5 oz)   01/31/22 88.3 kg (194 lb 10.7 oz)   01/24/22 88.9 kg (195 lb 15.8 oz)     Last 3 Recorded Weights in this Encounter    02/14/22 1136 02/14/22 1157 02/15/22 1112   Weight: 92.1 kg (203 lb) 101.6 kg (224 lb) 89.5 kg (197 lb 5 oz)     Malnutrition Assessment:  Malnutrition Status:  No malnutrition    Context:  Acute illness     Findings of the 6 clinical characteristics of malnutrition:   Energy Intake:  No significant decrease in energy intake  Weight Loss:  No significant weight loss     Body Fat Loss:  No significant body fat loss,     Muscle Mass Loss:  No significant muscle mass loss,    Fluid Accumulation:  No significant fluid accumulation,     Strength:  Not performed     Estimated Daily Nutrient Needs:  Energy (kcal): 1900 kcal/kg (NAVID Padilla 1. 2 x1.1); Weight Used for Energy Requirements: Current  Protein (g): 90-107g (1-1.2 g/kg); Weight Used for Protein Requirements: Current  Fluid (ml/day): 1900 mL; Method Used for Fluid Requirements: 1 ml/kcal    Nutrition Related Findings:   ABD: good appetite with active bowel sounds  Last BM: 2/14  Edema: LLE: 1+ (2/14/2022  9:34 AM)  RLE: 1+ (2/14/2022  9:34 AM)    Nutr. Labs:  Lab Results   Component Value Date/Time    GFR est AA >60 02/15/2022 12:21 AM    GFR est non-AA >60 02/15/2022 12:21 AM    Creatinine 0.67 02/15/2022 12:21 AM    BUN 9 02/15/2022 12:21 AM    Sodium 142 02/15/2022 12:21 AM    Potassium 3.4 (L) 02/15/2022 12:21 AM    Chloride 108 02/15/2022 12:21 AM    CO2 27 02/15/2022 12:21 AM     Lab Results   Component Value Date/Time    Glucose 85 02/15/2022 12:21 AM    Glucose (POC) 113 02/13/2022 12:31 PM     Lab Results   Component Value Date/Time    Hemoglobin A1c 5.6 01/24/2022 11:13 AM     Nutr.  Meds:  Lovenox, Protonix    Wounds:    Surgical incision       Current Nutrition Therapies:  ADULT DIET Regular    Documented Meal intake:  Patient Vitals for the past 168 hrs:   % Diet Eaten   02/14/22 1757 76 - 100%   02/14/22 0934 0%   02/13/22 1018 76 - 100%   02/12/22 0818 51 - 75%   02/12/22 0428 0%   02/11/22 2000 0%   02/11/22 0905 0%   02/11/22 0357 0%   02/10/22 2011 0%   02/10/22 0909 0%   02/10/22 0649 0%   02/09/22 1946 0%   02/09/22 0947 26 - 50%   02/09/22 0345 0%   02/08/22 2000 0%   02/08/22 1230 1 - 25%     Documentation of supplement intake:  Patient Vitals for the past 168 hrs:   Supplement intake %   02/12/22 0428 0%   02/11/22 2000 0%   02/11/22 0357 0%   02/10/22 2011 0%   02/10/22 0649 0%   02/09/22 1946 0%   02/09/22 0345 0%   02/08/22 2000 0%   02/08/22 1230 0%     Anthropometric Measures:  · Height:  5' 6\" (167.6 cm)  · Current Body Wt:  89.5 kg (197 lb 5 oz)   · Admission Body Wt:  190 lb    · Usual Body Wt:  86.2 kg (190 lb)     · Ideal Body Wt:  130 lbs:  151.8 %   Body mass index is 31.85 kg/m². · BMI Category:  Obese class 1 (BMI 30.0-34. 9)       Nutrition Diagnosis:   · Mild malnutrition related to acute injury/trauma,increased demand for energy/nutrients as evidenced by  (s/p surgery) - resolved    NEW:   · No Nutrition diagnosis at this time (2/15/2022)    Nutrition Interventions:   Food and/or Nutrient Delivery: Continue current diet  Nutrition Education and Counseling: Education completed  Coordination of Nutrition Care: Continue to monitor while inpatient,Interdisciplinary rounds    Goals:  PO intake 50-75% of meals daily over the next 5-7 days       Nutrition Monitoring and Evaluation:   Behavioral-Environmental Outcomes: None identified  Food/Nutrient Intake Outcomes: Food and nutrient intake  Physical Signs/Symptoms Outcomes: Biochemical data,Weight,GI status    Discharge Planning:    No discharge needs at this time     Electronically signed by Servando Siegel RD on 2/15/2022   Contact: [phone] or via [employer]

## 2022-02-15 NOTE — PROGRESS NOTES
[facility] at [address], [address] [facility] 100[address] [facility]  Surendra [phone]  F: [phone] Patient ID  Name: Rosezena Dancer  YOB: 1955  MRN: [id number]  Referring Provider:   No referring provider defined for this encounter. Primary Care Provider:   Chace Gomez MD       HEMATOLOGY/MEDICAL ONCOLOGY  NOTE   Date of Visit: 02/15/22  Reason for Evaluation:     Chief Complaint   Patient presents with    Abdominal Pain    Constipation       Subjective:     History of Present Illness:     Karina Carlos is a 75 y.o. F who presents as an inpatient consultation for anemia and . Christine Raddle Patient reports feeling well overall. Patient reports adequate oral intake of food and hydration. Patient denies any bowel or bladder problems. Patient denies any uncontrolled pain. Patient denies any shortness of breath.  -denies any fevers,chills.  -had gastric perforation; now being treated for possible abscesses. -denies any hip pain issues. -denies any known prior anemia. -on anticoagulation for thrombosis  -denies any know prior DVT or PE. Denies family history. Interval History:     Sitting up in chair at bedside. Denies any pain at present. Denies any SOB or signs of bleeding. Anxious to go home. Son at bedside.      Past Medical History:   Diagnosis Date    Anxiety     COVID-19 vaccine administered     boostered   [employer]    White coat syndrome without diagnosis of hypertension       Past Surgical History:   Procedure Laterality Date    HX COLONOSCOPY  2021    HX DILATION AND CURETTAGE      HX ORTHOPAEDIC        Social History     Tobacco Use    Smoking status: Never Smoker    Smokeless tobacco: Never Used   Substance Use Topics    Alcohol use: Yes     Comment: social      Family History   Problem Relation Age of Onset    Clotting Disorder Neg Hx      Current Facility-Administered Medications   Medication Dose Route Frequency    0.9% sodium chloride infusion 250 mL  250 mL IntraVENous PRN    piperacillin-tazobactam (ZOSYN) 3.375 g in 0.9% sodium chloride (MBP/ADV) 100 mL MBP  3.375 g IntraVENous Q8H    iron sucrose (VENOFER) 200 mg in 0.9% sodium chloride 100 mL IVPB  200 mg IntraVENous DAILY    acetaminophen (TYLENOL) tablet 650 mg  650 mg Oral Q4H PRN    pantoprazole (PROTONIX) tablet 40 mg  40 mg Oral Q12H    dilTIAZem IR (CARDIZEM) tablet 30 mg  30 mg Oral Q6H    enoxaparin (LOVENOX) injection 100 mg  100 mg SubCUTAneous Q12H    hydrALAZINE (APRESOLINE) 20 mg/mL injection 20 mg  20 mg IntraVENous Q6H PRN    furosemide (LASIX) injection 40 mg  40 mg IntraVENous BID    ALPRAZolam (XANAX) tablet 0.25 mg  0.25 mg Oral Q8H PRN    oxyCODONE IR (ROXICODONE) tablet 5 mg  5 mg Oral Q4H PRN    morphine injection 2 mg  2 mg IntraVENous Q3H PRN    acetaminophen (TYLENOL) suppository 650 mg  650 mg Rectal Q6H PRN    0.9% sodium chloride infusion 250 mL  250 mL IntraVENous PRN    LORazepam (ATIVAN) injection 0.5 mg  0.5 mg IntraVENous Q12H PRN    fluconazole (DIFLUCAN) 400mg/200 mL IVPB (premix)  400 mg IntraVENous Q24H      No Known Allergies     Review of Systems provided by:patient  General: denies fever and denies fatigue  Eyes: denies any acute vision loss and denies any eye pain  HEENT: denies epistaxis and denies trouble swallowing  Cardio: denies any chest pain and denies any significant leg swelling  Resp: denies any shortness of breath and denies any hemoptysis  Abdomen: denies any abdominal pain, denies any nausea, denies any vomiting and denies any diarrhea Patient denies any hematochezia. and Patient denies any melena. MSK: denies any myalgias and denies any severe hip pain.   Skin: denies any rash and denies any itching  Lymph: denies any lymph node enlargement and denies any lymph node tenderness  Neuro: denies any headache and denies any tremor  Psych: denies depression and denies anxiety      Objective:     Visit Vitals  BP 114/66 (BP 1 Location: Left upper arm, BP Patient Position: Sitting)   Pulse 95   Temp 98.2 °F (36.8 °C)   Resp 18   Ht 5' 6\" (1.676 m)   Wt 89.5 kg (197 lb 5 oz)   SpO2 96%   BMI 31.85 kg/m²     ECOG PS: 2  General: no distress  Eyes: anicteric sclerae  HENT: atraumatic  Neck: supple  Respiratory: normal respiratory effort  CV: no peripheral edema  GI: soft, nontender, nondistended, no masses, no hepatomegaly, no splenomegaly; drainage tube noted  Skin: no rashes; no ecchymoses; no petechiae  Psych: alert, oriented, appropriate affect, normal judgment/insight    Results:     Lab Results   Component Value Date/Time    WBC 9.5 02/15/2022 12:21 AM    HGB 8.7 (L) 02/15/2022 10:34 AM    HCT 26.5 (L) 02/15/2022 10:34 AM    PLATELET 573 (H) 55/30/9262 12:21 AM    MCV 94.8 02/15/2022 12:21 AM    ABS. NEUTROPHILS 6.9 02/15/2022 12:21 AM     Lab Results   Component Value Date/Time    Sodium 142 02/15/2022 12:21 AM    Potassium 3.4 (L) 02/15/2022 12:21 AM    Chloride 108 02/15/2022 12:21 AM    CO2 27 02/15/2022 12:21 AM    Glucose 85 02/15/2022 12:21 AM    BUN 9 02/15/2022 12:21 AM    Creatinine 0.67 02/15/2022 12:21 AM    GFR est AA >60 02/15/2022 12:21 AM    GFR est non-AA >60 02/15/2022 12:21 AM    Calcium 7.7 (L) 02/15/2022 12:21 AM    Glucose (POC) 113 02/13/2022 12:31 PM     Lab Results   Component Value Date/Time    Bilirubin, total 0.3 02/13/2022 12:41 PM    ALT (SGPT) 30 02/13/2022 12:41 PM    Alk. phosphatase 70 02/13/2022 12:41 PM    Protein, total 6.4 02/13/2022 12:41 PM    Albumin 2.2 (L) 02/13/2022 12:41 PM    Globulin 4.2 (H) 02/13/2022 12:41 PM       XR PELV AP ONLY    Result Date: 1/31/2022  Pelvis 2 views dated 1/31/2022 COMPARISON: None. History is status post surgery A single frontal view of the pelvis and the proximal right femur were obtained. There has been recent placement of a hip prosthesis. Recent placement of a right hip prosthesis.     XR ABD FLAT/ ERECT    Result Date: 2/4/2022  EXAM: XR ABD FLAT/ ERECT INDICATION: ? Constipation COMPARISON: None. TECHNIQUE: AP supine and erect abdomen, 2 views FINDINGS: Nondilated, nonobstructive bowel gas pattern. Moderate colorectal stool burden. No upright evidence of subdiaphragmatic free air. Partially visualized right total hip arthroplasty. Soft tissue gas overlying the right hip, likely postoperative. No evidence of ileus or obstruction. Constipation. CT CHEST ABD PELV W CONT    Result Date: 2/10/2022  EXAM: CT CHEST ABD PELV W CONT INDICATION: eval pneumonia and abdominal abscess progression or resolution COMPARISON: CT 2/7/2022. IV CONTRAST: 100 mL of Isovue-370. ORAL CONTRAST: None. TECHNIQUE: Following the uneventful intravenous administration of contrast, thin axial images were obtained through the chest, abdomen and pelvis. Coronal and sagittal reformats were generated. CT dose reduction was achieved through use of a standardized protocol tailored for this examination and automatic exposure control for dose modulation. FINDINGS: CHEST WALL: No mass or axillary lymphadenopathy. THYROID: Redemonstration of heterogeneous 3.2 x 3.3 cm left thyroid nodule with punctate calcification MEDIASTINUM: No mass or lymphadenopathy. JONNATHAN: No mass or lymphadenopathy. THORACIC AORTA: Atherosclerotic calcification without aneurysm or dissection. PULMONARY ARTERIES: Hypoechoic filling defect in right upper and lower lobe pulmonary arteries. TRACHEA/BRONCHI: Patent. ESOPHAGUS: No wall thickening or dilatation. HEART: Normal in size. PLEURA: Left greater than right pleural effusions, increased from prior. No pneumothorax. LUNGS: Compressive atelectasis overlying pleural effusions and lower lobes bilaterally. Aerated lungs are clear with no nodule, mass, airspace infiltrate, or edema. LIVER: No mass. BILIARY TREE: Gallbladder is within normal limits. CBD is not dilated.  SPLEEN: There is persistent perisplenic fluid along the lateral and posterior margin of the spleen with slight underlying splenic contour abnormal a suggesting subcapsular location. No focal spleen lesion. PANCREAS: No mass or ductal dilatation. ADRENALS: Unremarkable. KIDNEYS: No mass, calculus, or hydronephrosis. STOMACH: Unremarkable. SMALL BOWEL: No dilatation or wall thickening. COLON: Noninflamed appearing sigmoid diverticula. No dilation or wall thickening. APPENDIX: Opacified with contrast. Otherwise unremarkable. PERITONEUM: Surgical drain enters from the left abdomen and then traverses inferiorly and to the right upper pelvis. There is persistent complex fluid collection in the cul-de-sac in the pelvis measuring 3.3 x 6.3 cm with peripheral rim enhancement, previously 2.3 x 5.2 cm. There is decrease in small pneumoperitoneum along the anterior upper omentum to the right of midline. RETROPERITONEUM: Atherosclerotic calcification without aneurysm or dissection. No enlarged lymphadenopathy. REPRODUCTIVE ORGANS: Uterus and right ovary are unremarkable. There is no significant change in 3.4 cm cyst of left ovary. URINARY BLADDER: Partially obscured with no evident mass or calculus. BONES: Right hip arthroplasty prosthesis which generates streak artifact obscuring adjacent tissues. No acute fracture or aggressive lesion. Osteoarthritic changes of the left hip. Degenerative spine change. ABDOMINAL WALL: Bilateral flank edema with gas and fluid collection anterior to the right hip measuring 2.8 x 4.9 cm, previously 2.1 x 4.5 cm with caudal extent not included on field of view but craniocaudal extent greater than 8 cm. ADDITIONAL COMMENTS: N/A     1. Right upper and lower lobe pulmonary emboli. 2. Slight increase in size of rim-enhancing complex collection in the cul-de-sac of the pelvis concerning for abscess. 3. Gas and fluid containing collection in the soft tissues anterior to the right hip concerning for abscess. 4. Increased left greater than right pleural effusions. 5. Additional findings as above.  The findings were called to patient's nurse, Luis Felipe on 2/10/2022 at 10:54 by myself. [address] [facility] W WO CONT    Result Date: 2/7/2022  EXAM: CTA CHEST W OR W WO CONT, CT ABD PELV W CONT INDICATION: Recent perforated ulcer with oversewing and lysis of adhesions. Tachycardia, concern for pulmonary embolism. Leukocytosis, concern for abdominal abscess. eval for PE COMPARISON: 2/4/2022 CT of the abdomen. CONTRAST: 100 mL of Isovue-370. TECHNIQUE: Precontrast  images were obtained to localize the volume for acquisition. Multislice helical CT arteriography was performed from the diaphragm to the thoracic inlet during uneventful rapid bolus intravenous contrast administration. Then contiguous slices were obtained through the abdomen and pelvis. Lung and soft tissue windows were generated. Coronal and sagittal images were generated and 3D post processing consisting of coronal maximum intensity images was performed. CT dose reduction was achieved through use of a standardized protocol tailored for this examination and automatic exposure control for dose modulation. FINDINGS: There is a left thyroid low density 3.2 x 3.2 cm nodule with peripheral calcification. Bilateral pleural effusions, larger on the left. Attendant airspace disease with air bronchograms on the left. The pulmonary arteries are well enhanced and no pulmonary emboli are identified. Mild coronary artery calcification. There is no mediastinal or hilar adenopathy or mass. The aorta enhances normally without evidence of aneurysm or dissection. LIVER: No mass or biliary dilatation. GALLBLADDER: Unremarkable. SPLEEN: No mass. PANCREAS: No mass or ductal dilatation. ADRENALS: Unremarkable. KIDNEYS: No mass, calculus, or hydronephrosis. STOMACH: Unremarkable. SMALL BOWEL: No dilatation or wall thickening. COLON: Multiple sigmoid diverticula. . APPENDIX: Normal on axial image 62 PERITONEUM: Small amount of pneumoperitoneum in a patient status post recent surgery. One drain extends into the right lower quadrant. Complex fluid in the cul-de-sac has a density measurement of 20.7 HU and measures up to 5.2 cm (series 4, image 73). RETROPERITONEUM: No lymphadenopathy or aortic aneurysm. REPRODUCTIVE ORGANS: Unremarkable URINARY BLADDER: No mass or calculus. BONES: No destructive bone lesion. Right total hip replacement present. ADDITIONAL COMMENTS: N/A     No evidence for pulmonary embolism Left basilar pneumonia favored over subsegmental atelectasis Complex fluid collection in the cul-de-sac could represent a developing abscess. This is not amenable to percutaneous drainage. Complex left thyroid nodule, for which ultrasound characterization is recommended. Incidental sigmoid diverticulosis    CT LOW EXT RT WO CONT    Result Date: 1/3/2022  INTERPRETATION PROVIDED FOR COMPLIANCE ONLY AT NO CHARGE The CT scan was ordered for presurgical planning without specific directions asking for radiologic interpretation. Technically adequate for presurgical planning. Severe osteoarthritis of the right hip. Mild to moderate osteoarthritis of the left hip. Spondylosis in the lower lumbar spine. Degenerative bridging of the left sacroiliac joint. Degenerative bridging of the superior aspect of the pubic symphysis. Next There is a 3.1 cm left ovarian cyst. Multiple colonic diverticula are present. . No evidence of an aggressive osseous lesion. CT ABD PELV W CONT    Result Date: 2/7/2022  EXAM: CTA CHEST W OR W WO CONT, CT ABD PELV W CONT INDICATION: Recent perforated ulcer with oversewing and lysis of adhesions. Tachycardia, concern for pulmonary embolism. Leukocytosis, concern for abdominal abscess. eval for PE COMPARISON: 2/4/2022 CT of the abdomen. CONTRAST: 100 mL of Isovue-370. TECHNIQUE: Precontrast  images were obtained to localize the volume for acquisition.  Multislice helical CT arteriography was performed from the diaphragm to the thoracic inlet during uneventful rapid bolus intravenous contrast administration. Then contiguous slices were obtained through the abdomen and pelvis. Lung and soft tissue windows were generated. Coronal and sagittal images were generated and 3D post processing consisting of coronal maximum intensity images was performed. CT dose reduction was achieved through use of a standardized protocol tailored for this examination and automatic exposure control for dose modulation. FINDINGS: There is a left thyroid low density 3.2 x 3.2 cm nodule with peripheral calcification. Bilateral pleural effusions, larger on the left. Attendant airspace disease with air bronchograms on the left. The pulmonary arteries are well enhanced and no pulmonary emboli are identified. Mild coronary artery calcification. There is no mediastinal or hilar adenopathy or mass. The aorta enhances normally without evidence of aneurysm or dissection. LIVER: No mass or biliary dilatation. GALLBLADDER: Unremarkable. SPLEEN: No mass. PANCREAS: No mass or ductal dilatation. ADRENALS: Unremarkable. KIDNEYS: No mass, calculus, or hydronephrosis. STOMACH: Unremarkable. SMALL BOWEL: No dilatation or wall thickening. COLON: Multiple sigmoid diverticula. . APPENDIX: Normal on axial image 62 PERITONEUM: Small amount of pneumoperitoneum in a patient status post recent surgery. One drain extends into the right lower quadrant. Complex fluid in the cul-de-sac has a density measurement of 20.7 HU and measures up to 5.2 cm (series 4, image 73). RETROPERITONEUM: No lymphadenopathy or aortic aneurysm. REPRODUCTIVE ORGANS: Unremarkable URINARY BLADDER: No mass or calculus. BONES: No destructive bone lesion. Right total hip replacement present. ADDITIONAL COMMENTS: N/A     No evidence for pulmonary embolism Left basilar pneumonia favored over subsegmental atelectasis Complex fluid collection in the cul-de-sac could represent a developing abscess. This is not amenable to percutaneous drainage.  Complex left thyroid nodule, for which ultrasound characterization is recommended. Incidental sigmoid diverticulosis    CT ABD PELV W CONT    Result Date: 2/4/2022  EXAM: CT ABD PELV W CONT INDICATION: Abdominal pain and constipation COMPARISON: None CONTRAST: 100 mL of Isovue-370. ORAL CONTRAST: None TECHNIQUE: Following the uneventful intravenous administration of contrast, thin axial images were obtained through the abdomen and pelvis. Coronal and sagittal reconstructions were generated. CT dose reduction was achieved through use of a standardized protocol tailored for this examination and automatic exposure control for dose modulation. FINDINGS: LOWER THORAX: No significant abnormality in the incidentally imaged lower chest. LIVER: No mass. BILIARY TREE: Gallbladder is within normal limits. CBD is not dilated. SPLEEN: within normal limits. PANCREAS: No mass or ductal dilatation. ADRENALS: Unremarkable. KIDNEYS: 17 mm left parapelvic cyst, no follow-up required. No renal mass or hydronephrosis. STOMACH: Antral wall thickening is noted with associated inflammatory change. There is evidence of a defect in the lesser curvature compatible with perforated ulcer. SMALL BOWEL: No dilatation or wall thickening. COLON: No dilatation or wall thickening. APPENDIX: Within normal limits. PERITONEUM: Significant free intraperitoneal air and mild free fluid. RETROPERITONEUM: No lymphadenopathy or aortic aneurysm. REPRODUCTIVE ORGANS: There is a 3.4 cm left ovarian cyst. URINARY BLADDER: No mass or calculus. BONES: The patient is status post right total hip replacement. Postoperative changes are seen in the right hip soft tissues. Degenerative changes are seen in the lumbar spine. ABDOMINAL WALL: No mass or hernia. ADDITIONAL COMMENTS: N/A     Findings compatible with gastric perforation, with free intraperitoneal air and fluid. The findings were called to Cayla Navarrete on 2/4/2022 at 2:11 PM by myself.  [id number]    Assessment and Recommendations: Active Hospital Problems    Diagnosis Date Noted    Gastric ulcer 02/04/2022     Diagnoses and all orders for this visit:    1. Normocytic Anemia   -could be multifactorial and with recent GI Perforation could have been a culprit.  -moderate anemia but had normal hgb on admission. S/p 2 unit PRBCs ; last one this am with Hgb of 6.9  --added Ferritin to trend for anemia;retic and hemocult stool to assess for blood loss  --will tx with Venofer 200 mg IV x 2 doses   --monitor CBC daily and transfuse to keep Hgb greater than 7g/dL     2. Pulmonary Emboli  -continue forward with anticoagulation for at least 3 months unless otherwise contraindicated. -continually address risk/benefit ratio of anticoagulation vs. Bleeding risk. Added retic and hemoccult stool to assess for bleeding; will likely recommend discharge home on oral anticoagulation once rule out bleeding and with stable Hgb. 3. Gastric perforation ([facility] [address]  -2/5/22 s/p repair     4. Afib with RVR  2/13/22 New onset:managed with oral dilt     5. Pelvic Abscess  2/14 IR drainage : cultures pending   Abx per ID    Plan reviewed with Dr Zeferino Duvall.     Signed By:   Chago Manning NP

## 2022-02-15 NOTE — PROGRESS NOTES
Sent md a page \"Pt's lab results are in. H&H 6.9, 21.7. Md's response was Please transfuse one unit blood PRBCs now. The nurse did type and screen and started transfusion as soon as blood was available. 0620 Pt's sats dropped from 92 to 75. When the nurse woke the patient up, sat's recovered to 80. The nurse place the patient on 2lnc.  Patient's oxygen saturation currently 95% on [facility]

## 2022-02-16 LAB
ABO + RH BLD: NORMAL
ALBUMIN SERPL-MCNC: 1.9 G/DL (ref 3.5–5)
ALBUMIN/GLOB SERPL: 0.5 {RATIO} (ref 1.1–2.2)
ALP SERPL-CCNC: 70 U/L (ref 45–117)
ALT SERPL-CCNC: 21 U/L (ref 12–78)
ANION GAP SERPL CALC-SCNC: 5 MMOL/L (ref 5–15)
AST SERPL-CCNC: 16 U/L (ref 15–37)
BASOPHILS # BLD: 0.1 K/UL (ref 0–0.1)
BASOPHILS NFR BLD: 1 % (ref 0–1)
BILIRUB SERPL-MCNC: 0.4 MG/DL (ref 0.2–1)
BLD PROD TYP BPU: NORMAL
BLOOD GROUP ANTIBODIES SERPL: NORMAL
BPU ID: NORMAL
BUN SERPL-MCNC: 9 MG/DL (ref 6–20)
BUN/CREAT SERPL: 11 (ref 12–20)
CALCIUM SERPL-MCNC: 7.9 MG/DL (ref 8.5–10.1)
CHLORIDE SERPL-SCNC: 105 MMOL/L (ref 97–108)
CO2 SERPL-SCNC: 29 MMOL/L (ref 21–32)
CREAT SERPL-MCNC: 0.8 MG/DL (ref 0.55–1.02)
CROSSMATCH RESULT,%XM: NORMAL
DIFFERENTIAL METHOD BLD: ABNORMAL
EOSINOPHIL # BLD: 0.1 K/UL (ref 0–0.4)
EOSINOPHIL NFR BLD: 1 % (ref 0–7)
ERYTHROCYTE [DISTWIDTH] IN BLOOD BY AUTOMATED COUNT: 14 % (ref 11.5–14.5)
GLOBULIN SER CALC-MCNC: 3.8 G/DL (ref 2–4)
GLUCOSE SERPL-MCNC: 108 MG/DL (ref 65–100)
HCT VFR BLD AUTO: 24.1 % (ref 35–47)
HGB BLD-MCNC: 7.9 G/DL (ref 11.5–16)
IMM GRANULOCYTES # BLD AUTO: 0.1 K/UL (ref 0–0.04)
IMM GRANULOCYTES NFR BLD AUTO: 1 % (ref 0–0.5)
LYMPHOCYTES # BLD: 2.1 K/UL (ref 0.8–3.5)
LYMPHOCYTES NFR BLD: 22 % (ref 12–49)
MCH RBC QN AUTO: 29.6 PG (ref 26–34)
MCHC RBC AUTO-ENTMCNC: 32.8 G/DL (ref 30–36.5)
MCV RBC AUTO: 90.3 FL (ref 80–99)
MONOCYTES # BLD: 0.7 K/UL (ref 0–1)
MONOCYTES NFR BLD: 7 % (ref 5–13)
NEUTS SEG # BLD: 6.4 K/UL (ref 1.8–8)
NEUTS SEG NFR BLD: 68 % (ref 32–75)
NRBC # BLD: 0 K/UL (ref 0–0.01)
NRBC BLD-RTO: 0 PER 100 WBC
PLATELET # BLD AUTO: 561 K/UL (ref 150–400)
PMV BLD AUTO: 9.2 FL (ref 8.9–12.9)
POTASSIUM SERPL-SCNC: 2.8 MMOL/L (ref 3.5–5.1)
POTASSIUM SERPL-SCNC: 2.9 MMOL/L (ref 3.5–5.1)
PROT SERPL-MCNC: 5.7 G/DL (ref 6.4–8.2)
RBC # BLD AUTO: 2.67 M/UL (ref 3.8–5.2)
SODIUM SERPL-SCNC: 139 MMOL/L (ref 136–145)
SPECIMEN EXP DATE BLD: NORMAL
STATUS OF UNIT,%ST: NORMAL
UNIT DIVISION, %UDIV: 0
WBC # BLD AUTO: 9.4 K/UL (ref 3.6–11)

## 2022-02-16 PROCEDURE — 80053 COMPREHEN METABOLIC PANEL: CPT

## 2022-02-16 PROCEDURE — 74011250636 HC RX REV CODE- 250/636: Performed by: SURGERY

## 2022-02-16 PROCEDURE — 84132 ASSAY OF SERUM POTASSIUM: CPT

## 2022-02-16 PROCEDURE — 36415 COLL VENOUS BLD VENIPUNCTURE: CPT

## 2022-02-16 PROCEDURE — 74011250636 HC RX REV CODE- 250/636: Performed by: INTERNAL MEDICINE

## 2022-02-16 PROCEDURE — 74011000258 HC RX REV CODE- 258: Performed by: INTERNAL MEDICINE

## 2022-02-16 PROCEDURE — 97530 THERAPEUTIC ACTIVITIES: CPT

## 2022-02-16 PROCEDURE — 65660000000 HC RM CCU STEPDOWN

## 2022-02-16 PROCEDURE — 99232 SBSQ HOSP IP/OBS MODERATE 35: CPT | Performed by: INTERNAL MEDICINE

## 2022-02-16 PROCEDURE — 97116 GAIT TRAINING THERAPY: CPT

## 2022-02-16 PROCEDURE — 74011250637 HC RX REV CODE- 250/637: Performed by: INTERNAL MEDICINE

## 2022-02-16 PROCEDURE — 74011250636 HC RX REV CODE- 250/636: Performed by: NURSE PRACTITIONER

## 2022-02-16 PROCEDURE — 85025 COMPLETE CBC W/AUTO DIFF WBC: CPT

## 2022-02-16 PROCEDURE — 74011250636 HC RX REV CODE- 250/636: Performed by: HOSPITALIST

## 2022-02-16 PROCEDURE — 74011250637 HC RX REV CODE- 250/637: Performed by: NURSE PRACTITIONER

## 2022-02-16 RX ORDER — POTASSIUM CHLORIDE 7.45 MG/ML
10 INJECTION INTRAVENOUS
Status: COMPLETED | OUTPATIENT
Start: 2022-02-16 | End: 2022-02-16

## 2022-02-16 RX ORDER — POTASSIUM CHLORIDE 750 MG/1
40 TABLET, FILM COATED, EXTENDED RELEASE ORAL
Status: COMPLETED | OUTPATIENT
Start: 2022-02-16 | End: 2022-02-16

## 2022-02-16 RX ORDER — MAGNESIUM SULFATE HEPTAHYDRATE 40 MG/ML
2 INJECTION, SOLUTION INTRAVENOUS ONCE
Status: COMPLETED | OUTPATIENT
Start: 2022-02-16 | End: 2022-02-16

## 2022-02-16 RX ADMIN — DILTIAZEM HYDROCHLORIDE 30 MG: 30 TABLET, FILM COATED ORAL at 00:24

## 2022-02-16 RX ADMIN — ENOXAPARIN SODIUM 100 MG: 100 INJECTION SUBCUTANEOUS at 00:24

## 2022-02-16 RX ADMIN — POTASSIUM CHLORIDE 10 MEQ: 7.46 INJECTION, SOLUTION INTRAVENOUS at 09:27

## 2022-02-16 RX ADMIN — ENOXAPARIN SODIUM 100 MG: 100 INJECTION SUBCUTANEOUS at 13:36

## 2022-02-16 RX ADMIN — POTASSIUM CHLORIDE 10 MEQ: 7.46 INJECTION, SOLUTION INTRAVENOUS at 10:49

## 2022-02-16 RX ADMIN — POTASSIUM CHLORIDE 40 MEQ: 750 TABLET, FILM COATED, EXTENDED RELEASE ORAL at 09:26

## 2022-02-16 RX ADMIN — PANTOPRAZOLE SODIUM 40 MG: 40 TABLET, DELAYED RELEASE ORAL at 09:26

## 2022-02-16 RX ADMIN — MAGNESIUM SULFATE HEPTAHYDRATE 2 G: 40 INJECTION, SOLUTION INTRAVENOUS at 09:27

## 2022-02-16 RX ADMIN — PIPERACILLIN AND TAZOBACTAM 3.38 G: 3; .375 INJECTION, POWDER, LYOPHILIZED, FOR SOLUTION INTRAVENOUS at 03:49

## 2022-02-16 RX ADMIN — DILTIAZEM HYDROCHLORIDE 30 MG: 30 TABLET, FILM COATED ORAL at 05:18

## 2022-02-16 RX ADMIN — FLUCONAZOLE 400 MG: 2 INJECTION, SOLUTION INTRAVENOUS at 21:24

## 2022-02-16 RX ADMIN — PIPERACILLIN AND TAZOBACTAM 3.38 G: 3; .375 INJECTION, POWDER, LYOPHILIZED, FOR SOLUTION INTRAVENOUS at 21:24

## 2022-02-16 RX ADMIN — PIPERACILLIN AND TAZOBACTAM 3.38 G: 3; .375 INJECTION, POWDER, LYOPHILIZED, FOR SOLUTION INTRAVENOUS at 11:57

## 2022-02-16 RX ADMIN — PANTOPRAZOLE SODIUM 40 MG: 40 TABLET, DELAYED RELEASE ORAL at 21:24

## 2022-02-16 RX ADMIN — FUROSEMIDE 40 MG: 10 INJECTION, SOLUTION INTRAMUSCULAR; INTRAVENOUS at 05:17

## 2022-02-16 NOTE — PROGRESS NOTES
SURGERY PROGRESS NOTE      Admit Date: 2022    POD 12 Days Post-Op    Procedure: Procedure(s):  LAPAROSCOPY GENERAL DIAGNOSTIC, CONVERTED TO OPEN, LYSIS OF ADHESIONS, OVERSEWED PERFORATED ULCER      Subjective:   Feels well  Anxious for discharge      Objective:     Visit Vitals  BP (!) 111/57 (BP 1 Location: Left arm, BP Patient Position: At rest)   Pulse 100   Temp 98.1 °F (36.7 °C)   Resp 16   Ht 5' 6\" (1.676 m)   Wt 90.1 kg (198 lb 11.2 oz)   SpO2 97%   BMI 32.07 kg/m²        Temp (24hrs), Av.3 °F (36.8 °C), Min:97.8 °F (36.6 °C), Max:99.1 °F (37.3 °C)      Physical Exam:     Abdomen:  Soft. Non-tender, non-distended. Incision C/D/I.  Drain minimal output        Lab Results   Component Value Date/Time    WBC 9.4 2022 04:13 AM    HGB 7.9 (L) 2022 04:13 AM    HCT 24.1 (L) 2022 04:13 AM    PLATELET 170 (H)  04:13 AM    MCV 90.3 2022 04:13 AM     Lab Results   Component Value Date/Time    GFR est non-AA >60 2022 04:13 AM    GFR est AA >60 2022 04:13 AM    Creatinine 0.80 2022 04:13 AM    BUN 9 2022 04:13 AM    Sodium 139 2022 04:13 AM    Potassium 2.9 (L) 2022 07:31 AM    Chloride 105 2022 04:13 AM    CO2 29 2022 04:13 AM    Magnesium 2.0 2022 12:41 PM    Phosphorus 3.2 2022 09:56 AM       Assessment:     Active Problems:    Gastric ulcer (2022)      Normocytic anemia (2/15/2022)      Other pulmonary embolism without acute cor pulmonale ([address] (2/15/2022)        Plan/Recommendations/Medical Decision Making:   Home tomorrow if cultures remain sterile  Replete potasium - repeat lab in am

## 2022-02-16 NOTE — PROGRESS NOTES
Pt K+ -2.8 at 0400; Yesterday 3.4    Sig drop without clear reasons- no n/v, no NGT, eating reg diet, Mg previously WNL  Spoke with lab; does not appear to be hemolyzed    Will get stat redraw K  In interim post redraw give 20 IV K (pt has consistently been mildly hypokalemic while inpt) until lab back then replete as indicated    Discussed plan with RN    Altagracia Kingsley, DC           8655- Stat K redraw came back 2.9    Pt to receive 20meq IV K; as well as 40meq K PO and 2g Mg Sulfate IV already ordered by Dr Rajani Kelley.       Altagracia Kingsley, NP

## 2022-02-16 NOTE — PROGRESS NOTES
[facility] Progress Note           Aurora Sylvester DO    [phone] Office  [phone]  Fax    2022      Assessment & Plan:   · Suspected pelvic abscess. Status post IR drainage 2022. Preliminary cultures showing yeast on Gram stain. Continue fluconazole and piperacillin-tazobactam.  If  cultures remain sterile tomorrow may discharge on Augmentin 875 mg p.o. twice daily and fluconazole 400 mg p.o. daily x7 days   · Right hip fluid collection  Thought to be expected postoperative changes per orthopedic surgery. No intervention planned   · Leukocytosis. Resolved. Follow trend    · Pulmonary embolus. Management per primary team   · LLL airspace disease. Patient asymptomatic covered for possible aspiration on current antibiotics  ·   Perforated gastric ulcer status post open repair with Arnaldo Frock patch 2022  · Recent right TKA 2022  · Anemia. Hematology following          Subjective:     Anxious for discharge    Objective:     Vitals:   Visit Vitals  BP (!) 111/57 (BP 1 Location: Left arm, BP Patient Position: At rest)   Pulse 100   Temp 98.1 °F (36.7 °C)   Resp 16   Ht 5' 6\" (1.676 m)   Wt 198 lb 11.2 oz (90.1 kg)   SpO2 97%   BMI 32.07 kg/m²        Tmax:  Temp (24hrs), Av.3 °F (36.8 °C), Min:97.8 °F (36.6 °C), Max:99.1 °F (37.3 °C)      Exam:   Patient is intubated:  no    Physical Examination:   General:  Alert, cooperative, no distress   Head:  Normocephalic, atraumatic. Eyes:  Conjunctivae clear   Neck: Supple       Lungs:   No distress. Chest wall:     Heart:     Abdomen:    Nondistended. Pelvic drain with scant serous fluid   Extremities: Moves all. Skin:  No rash   Neurologic: CNII-XII intact. Normal strength     Labs:        No lab exists for component: ITNL   No results for input(s): CPK, CKMB, TROIQ in the last 72 hours.   Recent Labs     22  0731 22  0413 02/15/22  1034 02/15/22  0021 22  0339 22  1241 22  1241 NA  --  139  --  142 142   < > 141   K 2.9* 2.8*  --  3.4* 3.6   < > 3.5   CL  --  105  --  108 108   < > 106   CO2  --  29  --  27 26   < > 28   BUN  --  9  --  9 7   < > 5*   CREA  --  0.80  --  0.67 0.72   < > 0.86   GLU  --  108*  --  85 111*   < > 109*   MG  --   --   --   --   --   --  2.0   ALB  --  1.9*  --   --   --   --  2.2*   WBC  --  9.4  --  9.5  --   --  11.2*   HGB  --  7.9* 8.7* 6.9*  --    < > 8.5*   HCT  --  24.1* 26.5* 21.7*  --    < > 26.2*   PLT  --  561*  --  549*  --   --  548*    < > = values in this interval not displayed.      Recent Labs     02/13/22  1241   INR 1.4*   PTP 14.3*   APTT 23.5     Needs: urine analysis, urine sodium, protein and creatinine  No results found for: SHAYLEE, CREAU      Cultures:     No results found for: SDES  Lab Results   Component Value Date/Time    Culture result:  02/14/2022 03:30 PM     NO GROWTH THUS FAR Culture performed on Unspun Fluid    Culture result: NO GROWTH THUS FAR 02/14/2022 03:30 PM    Culture result: NO GROWTH 5 DAYS 02/04/2022 02:55 PM       Radiology:     Medications       Current Facility-Administered Medications   Medication Dose Route Frequency Last Admin    potassium chloride 10 mEq in 100 ml IVPB  10 mEq IntraVENous Q1H 10 mEq at 02/16/22 1049    magnesium sulfate 2 g/50 ml IVPB (premix or compounded)  2 g IntraVENous ONCE 2 g at 02/16/22 0927    0.9% sodium chloride infusion 250 mL  250 mL IntraVENous PRN      piperacillin-tazobactam (ZOSYN) 3.375 g in 0.9% sodium chloride (MBP/ADV) 100 mL MBP  3.375 g IntraVENous Q8H 3.375 g at 02/16/22 0349    iron sucrose (VENOFER) 200 mg in 0.9% sodium chloride 100 mL IVPB  200 mg IntraVENous DAILY 200 mg at 02/15/22 1613    acetaminophen (TYLENOL) tablet 650 mg  650 mg Oral Q4H  mg at 02/14/22 0138    pantoprazole (PROTONIX) tablet 40 mg  40 mg Oral Q12H 40 mg at 02/16/22 0926    dilTIAZem IR (CARDIZEM) tablet 30 mg  30 mg Oral Q6H 30 mg at 02/16/22 0518    enoxaparin (LOVENOX) injection 100 mg  100 mg SubCUTAneous Q12H 100 mg at 02/16/22 0024    hydrALAZINE (APRESOLINE) 20 mg/mL injection 20 mg  20 mg IntraVENous Q6H PRN 20 mg at 02/13/22 0101    ALPRAZolam (XANAX) tablet 0.25 mg  0.25 mg Oral Q8H PRN 0.25 mg at 02/13/22 1201    oxyCODONE IR (ROXICODONE) tablet 5 mg  5 mg Oral Q4H PRN      morphine injection 2 mg  2 mg IntraVENous Q3H PRN      acetaminophen (TYLENOL) suppository 650 mg  650 mg Rectal Q6H  mg at 02/06/22 0117    0.9% sodium chloride infusion 250 mL  250 mL IntraVENous PRN      LORazepam (ATIVAN) injection 0.5 mg  0.5 mg IntraVENous Q12H PRN 0.5 mg at 02/06/22 0117    fluconazole (DIFLUCAN) 400mg/200 mL IVPB (premix)  400 mg IntraVENous Q24H 400 mg at 02/15/22 2105           Case discussed with:       Clovis Garcia DO

## 2022-02-16 NOTE — PROGRESS NOTES
Problem: Mobility Impaired (Adult and Pediatric)  Goal: *Acute Goals and Plan of Care (Insert Text)  Description: FUNCTIONAL STATUS PRIOR TO ADMISSION: Patient was modified independent using a rolling walker for functional mobility. HOME SUPPORT PRIOR TO ADMISSION: The patient lived with  and required minimal assistance/contact guard assist for some ADLs. bedroom on second floor, has a rolling walker, had anterior LAVERNE 1/31/2022    Physical Therapy Goals  Initiated 2/5/2022  1. Patient will move from supine to sit and sit to supine  in bed with minimal assistance/contact guard assist within 7 day(s). 2.  Patient will transfer from bed to chair and chair to bed with supervision/set-up using the least restrictive device within 7 day(s). 3.  Patient will perform sit to stand with modified independence within 7 day(s). 4.  Patient will ambulate with independence for 200 feet with the least restrictive device within 7 day(s). 5.  Patient will ascend/descend 13 stairs with handrail(s) with minimal assistance/contact guard assist within 7 day(s). Outcome: Progressing Towards Goal     PHYSICAL THERAPY TREATMENT  Patient: Sanchez Irving (36 y.o. female)  Date: 2/16/2022  Diagnosis: Gastric ulcer [K25.9] <principal problem not specified>  Procedure(s) (LRB):  LAPAROSCOPY GENERAL DIAGNOSTIC, CONVERTED TO OPEN, LYSIS OF ADHESIONS, OVERSEWED PERFORATED ULCER (N/A) 12 Days Post-Op  Precautions: Fall  Chart, physical therapy assessment, plan of care and goals were reviewed. ASSESSMENT  Patient continues with skilled PT services and is progressing towards goals. She demonstrates proper transfer and gait techniques using RW and tolerates activity without complaints. Pain rated 0/10. SpO2 94% with activity using room air. Pt denies home mobility concerns and is eager for discharge which she hopes will occur tomorrow.      Current Level of Function Impacting Discharge (mobility/balance): supervision to mod I    Other factors to consider for discharge: spouse at bedside         PLAN :  Patient continues to benefit from skilled intervention to address the above impairments. Continue treatment per established plan of care. to address goals. Recommendation for discharge: (in order for the patient to meet his/her long term goals)  Outpatient physical therapy follow up recommended for post op R LAVERNE    This discharge recommendation:  Has been made in collaboration with the attending provider and/or case management    IF patient discharges home will need the following DME: none       SUBJECTIVE:   Patient stated I got everything I needed and it's still waiting for me, re: DME and home set-up s/p R LAVERNE. Pt received seated in bedside chair +chair alarm, agreeable to PT and cleared by RN.       OBJECTIVE DATA SUMMARY:   Critical Behavior:  Neurologic State: Alert,Appropriate for age  Orientation Level: Oriented X4  Cognition: Appropriate decision making     Functional Mobility Training:  Bed Mobility:     Supine to Sit:  (discussed abdominal bracing with pillow/blanket roll)              Transfers:  Sit to Stand: Modified independent  Stand to Sit: Modified independent        Bed to Chair: Modified independent (assist for lines/leads only)                    Balance:  Sitting: Intact  Standing: With support  Standing - Static: Good  Standing - Dynamic : Good  Ambulation/Gait Training:  Distance (ft): 250 Feet (ft)  Assistive Device: Walker, rolling;Gait belt  Ambulation - Level of Assistance: Supervision;Modified independent        Gait Abnormalities: Antalgic           Stance: Right decreased                                    Pain Ratin/10 (NPS)    Activity Tolerance:   Good    After treatment patient left in no apparent distress:   Sitting in chair, Call bell within reach, Bed / chair alarm activated, and Caregiver / family present    COMMUNICATION/COLLABORATION:   The patients plan of care was discussed with: Registered nurse.      Leticia Hudson PT, DPT   Time Calculation: 27 mins

## 2022-02-16 NOTE — PROGRESS NOTES
[facility]  [address] [facility], [place], [address] [place] [address]  [phone]      Medical Progress Note      NAME: Jose Callahan   :  1955  MRM:  [id number]    Date/Time: 2022         Assessment / Plan:     #AFwRVR: New onset and now resolved; no events on tele x 72 hrs. Likely multifactorial with volume overload, ongoing infection, and PE contributing. Responded well to diuresis and dilt gtt. Echo normal.    - Will plan to dc dilt and continue to monitor   - She is on apixaban for PE            - Can consider GABRIELE, but likely an acute reversible episode     #Volume overload: Much improved. Mild hypokalemia today, so will hold on further diuresis       #Pelvic abscess: Now s/p IR drainage . CRP climbing but vitals reassuring. Abx, antifungal per ID, surg                       - F/u Cx data     #Perforated Gastric Ulcer: s/p open repair with Clois Ion patch 22              - Fluconazole  -               - Abx as above; hoping to dc on amox/clav and fluc tmr if cx sterile              - IV PPI to PO              - Nutrition per Surgery     #R hip fluid collection: S/p hip LAVERNE. Ortho reviewed, low c/f infection. #Pulmonary Emboli: Noted on CT 2/10; right upper and lower lobes. - LMWh 1mg/kg; can transition to DOAC at dc; 3 months     #Anemia: Likely blood loss. Has been stable even on full dose LMWH              - Trend              - Transfuse Hb < 7   - Empiric IV iron per heme   - DC on PO iron     #Aspiratoin pna?: Noted on imaging; abx as above     #LAVERNE 22: As above         Will continue to follow with you.  ___________________________________________________    Attending Physician: Krystyna Brenner MD        Subjective:     Chief Complaint:  Hilaria Shows. Feels well today without any complaints. Some mild swelling still in legs, but overall much better. Pain is OK.      ROS:  (bold if positive, if negative)    Tolerating PT  Tolerating Diet Objective:       Vitals:          Last 24hrs VS reviewed since prior progress note.  Most recent are:    Visit Vitals  /67 (BP 1 Location: Right upper arm, BP Patient Position: At rest)   Pulse (!) 102   Temp 98.2 °F (36.8 °C)   Resp 18   Ht 5' 6\" (1.676 m)   Wt 90.1 kg (198 lb 11.2 oz)   SpO2 96%   BMI 32.07 kg/m²     SpO2 Readings from Last 6 Encounters:   02/16/22 96%   01/31/22 100%   01/24/22 99%    O2 Flow Rate (L/min): 2 l/min       Intake/Output Summary (Last 24 hours) at 2/16/2022 1602  Last data filed at 2/16/2022 0708  Gross per 24 hour   Intake 510 ml   Output 0 ml   Net 510 ml          Exam:     Physical Exam:    Gen:  Well-developed, well-nourished, in no acute distress  HEENT:  Pink conjunctivae, PERRL, hearing intact to voice, moist mucous membranes  Neck:  Supple, without masses, thyroid non-tender  Resp:  No accessory muscle use, clear breath sounds without wheezes rales or rhonchi  Card:  No murmurs, normal S1, S2 without thrills, bruitstrace peripheral edema  Abd:  Soft, non-tender, non-distended, normoactive bowel sounds are present  Musc:  No cyanosis or clubbing  Skin:  No rashes or ulcers, skin turgor is good  Neuro:  Cranial nerves 3-12 are grossly intact,  strength is 5/5 bilaterally and dorsi / plantarflexion is 5/5 bilaterally, follows commands appropriately  Psych:  Good insight, oriented to person, place and time, alert    Medications Reviewed: (see below)    Lab Data Reviewed: (see below)    ______________________________________________________________________    Medications:     Current Facility-Administered Medications   Medication Dose Route Frequency    0.9% sodium chloride infusion 250 mL  250 mL IntraVENous PRN    piperacillin-tazobactam (ZOSYN) 3.375 g in 0.9% sodium chloride (MBP/ADV) 100 mL MBP  3.375 g IntraVENous Q8H    iron sucrose (VENOFER) 200 mg in 0.9% sodium chloride 100 mL IVPB  200 mg IntraVENous DAILY    acetaminophen (TYLENOL) tablet 650 mg 650 mg Oral Q4H PRN    pantoprazole (PROTONIX) tablet 40 mg  40 mg Oral Q12H    enoxaparin (LOVENOX) injection 100 mg  100 mg SubCUTAneous Q12H    hydrALAZINE (APRESOLINE) 20 mg/mL injection 20 mg  20 mg IntraVENous Q6H PRN    ALPRAZolam (XANAX) tablet 0.25 mg  0.25 mg Oral Q8H PRN    oxyCODONE IR (ROXICODONE) tablet 5 mg  5 mg Oral Q4H PRN    morphine injection 2 mg  2 mg IntraVENous Q3H PRN    acetaminophen (TYLENOL) suppository 650 mg  650 mg Rectal Q6H PRN    0.9% sodium chloride infusion 250 mL  250 mL IntraVENous PRN    LORazepam (ATIVAN) injection 0.5 mg  0.5 mg IntraVENous Q12H PRN    fluconazole (DIFLUCAN) 400mg/200 mL IVPB (premix)  400 mg IntraVENous Q24H            Lab Review:     Recent Labs     02/16/22 0413 02/15/22  1034 02/15/22  0021   WBC 9.4  --  9.5   HGB 7.9* 8.7* 6.9*   HCT 24.1* 26.5* 21.7*   *  --  549*     Recent Labs     02/16/22  0731 02/16/22  0413 02/15/22  0021 02/14/22  0339 02/14/22 0339   NA  --  139 142  --  142   K 2.9* 2.8* 3.4*   < > 3.6   CL  --  105 108  --  108   CO2  --  29 27  --  26   GLU  --  108* 85  --  111*   BUN  --  9 9  --  7   CREA  --  0.80 0.67  --  0.72   CA  --  7.9* 7.7*  --  7.8*   ALB  --  1.9*  --   --   --    ALT  --  21  --   --   --     < > = values in this interval not displayed.      No components found for: [place]

## 2022-02-16 NOTE — PROGRESS NOTES
0730- Bedside and Verbal shift change report given to HELIO Quinn (oncoming nurse) by Summer Marcum RN (offgoing nurse). Report included the following information SBAR, Kardex, ED Summary, OR Summary, Procedure Summary, Intake/Output, MAR, Accordion, and Recent Results. This patient was assisted with Intentional Toileting every 2 hours during this shift. Documentation of ambulation and output reflected on Flowsheet. 1930-Bedside and Verbal shift change report given to RN (oncoming nurse) by HELIO Quinn(offgoing nurse). Report included the following information SBAR, Kardex, ED Summary, OR Summary, Procedure Summary, Intake/Output, MAR, Accordion, Recent Results, and Med Rec Status.

## 2022-02-16 NOTE — ROUTINE PROCESS
Bedside and Verbal shift change report given to Pb Jones (oncoming nurse) by Tamara Harrison (offgoing nurse). Report included the following information SBAR, Kardex, ED Summary, OR Summary, Procedure Summary, Intake/Output, MAR, Accordion, Recent Results and Cardiac Rhythm NSR.

## 2022-02-17 VITALS
DIASTOLIC BLOOD PRESSURE: 67 MMHG | BODY MASS INDEX: 32.08 KG/M2 | TEMPERATURE: 98.4 F | RESPIRATION RATE: 15 BRPM | SYSTOLIC BLOOD PRESSURE: 128 MMHG | WEIGHT: 199.6 LBS | HEART RATE: 97 BPM | OXYGEN SATURATION: 98 % | HEIGHT: 66 IN

## 2022-02-17 LAB
ANION GAP SERPL CALC-SCNC: 6 MMOL/L (ref 5–15)
BUN SERPL-MCNC: 11 MG/DL (ref 6–20)
BUN/CREAT SERPL: 14 (ref 12–20)
CALCIUM SERPL-MCNC: 7.7 MG/DL (ref 8.5–10.1)
CHLORIDE SERPL-SCNC: 108 MMOL/L (ref 97–108)
CO2 SERPL-SCNC: 28 MMOL/L (ref 21–32)
CREAT SERPL-MCNC: 0.79 MG/DL (ref 0.55–1.02)
ERYTHROCYTE [DISTWIDTH] IN BLOOD BY AUTOMATED COUNT: 14 % (ref 11.5–14.5)
GLUCOSE SERPL-MCNC: 100 MG/DL (ref 65–100)
HCT VFR BLD AUTO: 28.2 % (ref 35–47)
HGB BLD-MCNC: 8.3 G/DL (ref 11.5–16)
MCH RBC QN AUTO: 30.2 PG (ref 26–34)
MCHC RBC AUTO-ENTMCNC: 29.4 G/DL (ref 30–36.5)
MCV RBC AUTO: 102.5 FL (ref 80–99)
NRBC # BLD: 0 K/UL (ref 0–0.01)
NRBC BLD-RTO: 0 PER 100 WBC
PLATELET # BLD AUTO: 594 K/UL (ref 150–400)
PMV BLD AUTO: 9.3 FL (ref 8.9–12.9)
POTASSIUM SERPL-SCNC: 3.7 MMOL/L (ref 3.5–5.1)
RBC # BLD AUTO: 2.75 M/UL (ref 3.8–5.2)
SODIUM SERPL-SCNC: 142 MMOL/L (ref 136–145)
WBC # BLD AUTO: 8 K/UL (ref 3.6–11)

## 2022-02-17 PROCEDURE — 36415 COLL VENOUS BLD VENIPUNCTURE: CPT

## 2022-02-17 PROCEDURE — 80048 BASIC METABOLIC PNL TOTAL CA: CPT

## 2022-02-17 PROCEDURE — 74011250636 HC RX REV CODE- 250/636: Performed by: HOSPITALIST

## 2022-02-17 PROCEDURE — 85027 COMPLETE CBC AUTOMATED: CPT

## 2022-02-17 PROCEDURE — 74011000258 HC RX REV CODE- 258: Performed by: INTERNAL MEDICINE

## 2022-02-17 PROCEDURE — 74011250637 HC RX REV CODE- 250/637: Performed by: NURSE PRACTITIONER

## 2022-02-17 PROCEDURE — 74011250636 HC RX REV CODE- 250/636: Performed by: INTERNAL MEDICINE

## 2022-02-17 PROCEDURE — 99232 SBSQ HOSP IP/OBS MODERATE 35: CPT | Performed by: INTERNAL MEDICINE

## 2022-02-17 RX ORDER — AMOXICILLIN AND CLAVULANATE POTASSIUM 875; 125 MG/1; MG/1
1 TABLET, FILM COATED ORAL 2 TIMES DAILY
Qty: 14 TABLET | Refills: 0 | Status: SHIPPED | OUTPATIENT
Start: 2022-02-17 | End: 2022-02-24

## 2022-02-17 RX ORDER — FLUCONAZOLE 200 MG/1
400 TABLET ORAL DAILY
Qty: 14 TABLET | Refills: 0 | Status: SHIPPED | OUTPATIENT
Start: 2022-02-17 | End: 2022-02-24

## 2022-02-17 RX ORDER — PANTOPRAZOLE SODIUM 40 MG/1
40 TABLET, DELAYED RELEASE ORAL DAILY
Qty: 30 TABLET | Refills: 1 | Status: SHIPPED | OUTPATIENT
Start: 2022-02-17 | End: 2022-04-19

## 2022-02-17 RX ADMIN — ENOXAPARIN SODIUM 100 MG: 100 INJECTION SUBCUTANEOUS at 02:00

## 2022-02-17 RX ADMIN — PANTOPRAZOLE SODIUM 40 MG: 40 TABLET, DELAYED RELEASE ORAL at 08:21

## 2022-02-17 RX ADMIN — PIPERACILLIN AND TAZOBACTAM 3.38 G: 3; .375 INJECTION, POWDER, LYOPHILIZED, FOR SOLUTION INTRAVENOUS at 05:00

## 2022-02-17 NOTE — DISCHARGE SUMMARY
Discharge Summary    Patient: Jarrett Arteaga               Sex: female          DOA: [unfilled]       YOB: 1955      Age:  75 y.o.        LOS:  LOS: 13 days                Admit Date: 2/4/2022    Discharge Date: 2/17/2022    Admission Diagnoses: Gastric ulcer [K25.9]    Discharge Diagnoses:    Problem List as of 2/17/2022 Never Reviewed          Codes Class Noted - Resolved    Normocytic anemia ICD-10-CM: D64.9  ICD-9-CM: 285.9  2/15/2022 - Present        Other pulmonary embolism without acute cor pulmonale ([facility] [address] ICD-10-CM: I26.99  ICD-9-CM: 415.19  2/15/2022 - Present        Gastric ulcer ICD-10-CM: K25.9  ICD-9-CM: 531.90  2/4/2022 - Present        Primary osteoarthritis of right hip ICD-10-CM: M16.11  ICD-9-CM: 715.15  1/31/2022 - Present              Discharge Condition: Good    Hospital Course: Jarrett Arteaga is a 75 y.o. female who is s/p right hip replacement on 1/31/22 by Dr. Trudi Torre. She came to ED, on 2/4, due to abdominal pain and constipation. On CT it was found she had free air in abdomen. She was taken to OR immediately and was found to have gastric perf. A LAPAROSCOPY GENERAL DIAGNOSTIC, CONVERTED TO OPEN, LYSIS OF ADHESIONS, OVERSEWED PERFORATED ULCER, katya patch was completed. She tolerated surgery well. No immediate concerns. Over the next day or two pt continued to have tachycardiac and leukocytosis; repeat CT of chest/ab/pelvis found poss aspiration pneumonia along with poss sm abd fluid collection. ID called and antibx were changed. WBC trended upward again along with continued tachycardia. Another CT done showed 2 R lobe PE's along with continued ab fluid collection. Fluid collection was felt to be too small for IR to drain at initial consultation, though after no change in leukocytosis IR drainage was attempted. Drain left in.  Leukocytosis improving.   On treatment dose lovenox in hospital.  Heme saw pt for concerns of management outpt PE as well and anemia in the hospital setting. Over weekend transferred off floor and into ICU stepdown due to rapid A-fib with RVR. This resolved nicely. No longer current concern for cards and hospitalists. Will follow up with cards outpt. Cultures speciated and ID gave recs for outpatient meds. Today pt feeling well. No complaints. No n/v/ab pain. Tolerating regular diet. BMs +. Ready for discharge. Will remove drain and keep staples upon discharge. Educated on all follow up instructions. All questions answered. Case Management to give Rx card for eliquis on d/c. Pt will have follow up with cards, PCP, gi, heme, ortho, and us post discharge for continued care management. PE:               Abdomen:  Soft. Non-tender, non-distended. Incision C/D/I. Drain minimal output    Consults: Cardiology, hospitalist, infectious disease, heme    Significant Diagnostic Studies: see electronic medical record for imaging, labs, etc      Discharge Medications:     Current Discharge Medication List      START taking these medications    Details   amoxicillin-clavulanate (AUGMENTIN) 875-125 mg per tablet Take 1 Tablet by mouth two (2) times a day for 7 days. Qty: 14 Tablet, Refills: 0      fluconazole (DIFLUCAN) 200 mg tablet Take 2 Tablets by mouth daily for 7 days. FDA advises cautious prescribing of oral fluconazole in pregnancy. Qty: 14 Tablet, Refills: 0      pantoprazole (PROTONIX) 40 mg tablet Take 1 Tablet by mouth daily for 60 days. Qty: 30 Tablet, Refills: 1      apixaban (ELIQUIS) 5 mg tablet Take 1 Tablet by mouth two (2) times a day for 30 days. Qty: 60 Tablet, Refills: 0         CONTINUE these medications which have NOT CHANGED    Details   aspirin delayed-release 81 mg tablet Take 1 Tablet by mouth two (2) times a day. Qty: 60 Tablet, Refills: 0      ibuprofen (MOTRIN) 800 mg tablet Take 1 Tablet by mouth every six (6) hours as needed for Pain.   Qty: 50 Tablet, Refills: 2      acetaminophen (Acetaminophen Pain Relief) 500 mg tablet Take 2 Tablets by mouth every six (6) hours as needed for Pain. Qty: 60 Tablet, Refills: 1      ondansetron (ZOFRAN ODT) 8 mg disintegrating tablet Take 0.5 Tablets by mouth every eight (8) hours as needed for Nausea. Qty: 30 Tablet, Refills: 0      efinaconazole (Jublia) rosendo topical solution Apply  to affected area daily. multivitamin (ONE A DAY) tablet Take 1 Tablet by mouth daily.          STOP taking these medications       oxyCODONE IR (ROXICODONE) 5 mg immediate release tablet Comments:   Reason for Stopping:         traMADoL (ULTRAM) 50 mg tablet Comments:   Reason for Stopping:               Activity: as per ortho; no heavy lifting for one month per general surgery    Diet: Regular Diet- avoid heavily acidic foods    Wound Care: Keep wound clean and dry- drain to be removed today and staples in office    Follow-up: 1 week with general surgery; other appts- PCP, hematology, ortho, gastrointestinal, cardiology      Pt seen and discharge discussed with Dr Joseph Kerr and Dr Kaia Quintero, NP

## 2022-02-17 NOTE — PROGRESS NOTES
CM Note:  Pt to d/c to home today transported by her . She will f/u OP with providers and will go to OP therapy. She was given 2 coupons for Eliquis (30-day and $10 co-pay).   HELIO Matthews

## 2022-02-17 NOTE — PROGRESS NOTES
0730- Bedside and Verbal shift change report given to HELIO Quinn (oncoming nurse) by Meek Becerra RN (offgoing nurse). Report included the following information SBAR, Kardex, ED Summary, OR Summary, Procedure Summary, Intake/Output, MAR, Accordion, and Recent Results. This patient was assisted with Intentional Toileting every 2 hours during this shift. Documentation of ambulation and output reflected on Flowsheet. I have reviewed discharge instructions with the patient. The patient verbalized understanding.

## 2022-02-17 NOTE — DISCHARGE INSTRUCTIONS
Patient Education        Learning About What to Expect at Home After Surgery  What do you need to know when you leave the hospital after surgery? Each person recovers from surgery at a different pace. Your discharge plan will help you leave the hospital safely. It will outline the care you need. And it will give you information about the things you'll need to do at home. Make sure you get your plan in writing. Look for information on:  · What your medicines are and how to take them. · When you need to see the doctor again or get any follow-up tests. · How and when to change bandages and dressings. · How active you can be. This may include physical therapy. · How to prevent falls. · What you can eat and can't eat. What do you need to know about taking medicines? Your doctor will talk with you about restarting your medicines. He or she will also tell you about taking any new medicines. · If you take aspirin or some other blood thinner, be sure to talk to your doctor. He or she will tell you if and when to start taking those medicines again. · If your doctor gave you a prescription medicine for pain, take it as prescribed. · If you aren't taking a prescription pain medicine, ask your doctor if you can take an over-the-counter medicine. How can you take care of your incision? If you have a cut (incision), follow your doctor's instructions. If you didn't get instructions, follow this general advice:  · You'll have a dressing over the cut. This helps the incision heal and protects it. ? Change the bandage daily. ? If you have strips of tape on the cut, leave them on for a week or until they fall off.  ? If you have stitches or staples, your doctor will tell you when to return to have them removed. ? If you have skin glue (liquid stitches), leave it on until it falls off. · Wash the area daily with warm water, and pat it dry. Don't use hydrogen peroxide or alcohol.   · You may shower 24 to 48 hours after surgery. Before showering, cover the dressing with a plastic bag or use another method of keeping it dry. Pat the incision dry if it gets damp. Don't swim or take a bath until your doctor tells you it's okay. When can you be active again? One of the most important things you can do for yourself after surgery is to find ways to move. When you move as much as possible, even in bed, you are helping your body heal.  Here are some tips:  · Don't move quickly or lift anything heavy until you feel better. · Taking short walks is a good way to help your body heal.  · Rest when you feel tired. Your doctor may give you instructions on when you can do your normal activities again, such as driving and going back to work. What do you need to know about eating? If your doctor told you when you can start eating and what foods you can eat, follow your doctor's instructions. If you did not get instructions, follow this general advice:  · You can eat your normal diet when you feel well. If your stomach is upset, try bland, low-fat foods. These include plain rice, broiled chicken, toast, and yogurt. · If your bowel movements aren't regular right after surgery, try to avoid constipation and straining. Drink plenty of water. Your doctor may suggest fiber, a stool softener, or a mild laxative. What do you do if you have infection or pain? If you have signs of infection, call your doctor. These signs include:  · Increased pain, swelling, warmth, or redness. · Red streaks leading from the incision. · Pus draining from the incision. · A fever. Also call your doctor if you have pain that does not get better after you take pain medicine. Follow-up care is a key part of your treatment and safety. Be sure to make and go to all appointments, and call your doctor if you are having problems. It's also a good idea to know your test results and keep a list of the medicines you take. Where can you learn more?   Go to http://www.gray.com/  Enter [id number] in the search box to learn more about \"Learning About What to Expect at Home After Surgery. \"  Current as of: February 11, 2021               Content Version: 13.0  © 0090-4301 "[employer]. Care instructions adapted under license by [employer] (which disclaims liability or warranty for this information). If you have questions about a medical condition or this instruction, always ask your healthcare professional. [address] any warranty or liability for your use of this information. Patient Education        Anemia: Care Instructions  Your Care Instructions     Anemia is a low level of red blood cells, which carry oxygen throughout your body. Many things can cause anemia. Lack of iron is one of the most common causes. Your body needs iron to make hemoglobin, a substance in red blood cells that carries oxygen from the lungs to your body's cells. Without enough iron, the body produces fewer and smaller red blood cells. As a result, your body's cells do not get enough oxygen, and you feel tired and weak. And you may have trouble concentrating. Bleeding is the most common cause of a lack of iron. You may have heavy menstrual bleeding or bleeding caused by conditions such as ulcers, hemorrhoids, or cancer. Regular use of aspirin or other anti-inflammatory medicines (such as ibuprofen) also can cause bleeding in some people. A lack of iron in your diet also can cause anemia, especially at times when the body needs more iron, such as during pregnancy, infancy, and the teen years. Your doctor may have prescribed iron pills. It may take several months of treatment for your iron levels to return to normal. Your doctor also may suggest that you eat foods that are rich in iron, such as meat and beans. There are many other causes of anemia. It is not always due to a lack of iron.  Finding the specific cause of your anemia will help your doctor find the right treatment for you. Follow-up care is a key part of your treatment and safety. Be sure to make and go to all appointments, and call your doctor if you are having problems. It's also a good idea to know your test results and keep a list of the medicines you take. How can you care for yourself at home? · Take your medicines exactly as prescribed. Call your doctor if you think you are having a problem with your medicine. · If your doctor recommends iron pills, take them as directed:  ? Try to take the pills on an empty stomach about 1 hour before or 2 hours after meals. But you may need to take iron with food to avoid an upset stomach. ? Do not take antacids or drink milk or caffeine drinks (such as coffee, tea, or cola) at the same time or within 2 hours of the time that you take your iron. They can make it hard for your body to absorb the iron. ? Vitamin C (from food or supplements) helps your body absorb iron. Try taking iron pills with a glass of orange juice or some other food that is high in vitamin C, such as citrus fruits. ? Iron pills may cause stomach problems, such as heartburn, nausea, diarrhea, constipation, and cramps. Be sure to drink plenty of fluids, and include fruits, vegetables, and fiber in your diet each day. Iron pills often make your bowel movements dark or green. ? If you forget to take an iron pill, do not take a double dose of iron the next time you take a pill. ? Keep iron pills out of the reach of small children. An overdose of iron can be very dangerous. · Follow your doctor's advice about eating iron-rich foods. These include red meat, shellfish, poultry, eggs, beans, raisins, whole-grain bread, and leafy green vegetables. · Steam vegetables to help them keep their iron content. When should you call for help? Call 911 anytime you think you may need emergency care. For example, call if:    · You have symptoms of a heart attack.  These may include:  ? Chest pain or pressure, or a strange feeling in the chest.  ? Sweating. ? Shortness of breath. ? Nausea or vomiting. ? Pain, pressure, or a strange feeling in the back, neck, jaw, or upper belly or in one or both shoulders or arms. ? Lightheadedness or sudden weakness. ? A fast or irregular heartbeat. After you call 911, the  may tell you to chew 1 adult-strength or 2 to 4 low-dose aspirin. Wait for an ambulance. Do not try to drive yourself.     · You passed out (lost consciousness). Call your doctor now or seek immediate medical care if:    · You have new or increased shortness of breath.     · You are dizzy or lightheaded, or you feel like you may faint.     · Your fatigue and weakness continue or get worse.     · You have any abnormal bleeding, such as:  ? Nosebleeds. ? Vaginal bleeding that is different (heavier, more frequent, at a different time of the month) than what you are used to.  ? Bloody or black stools, or rectal bleeding. ? Bloody or pink urine. Watch closely for changes in your health, and be sure to contact your doctor if:    · You do not get better as expected. Where can you learn more? Go to http://www.koroma.com/  Enter R301 in the search box to learn more about \"Anemia: Care Instructions. \"  Current as of: April 29, 2021               Content Version: 13.0  © 2006-2021 [employer]. Care instructions adapted under license by [employer] (which disclaims liability or warranty for this information). If you have questions about a medical condition or this instruction, always ask your healthcare professional. [place] [address] any warranty or liability for your use of this information. Patient Education        Pulmonary Embolism: Care Instructions  Overview     Pulmonary embolism is the sudden blockage of an artery in the lung.  Blood clots in the deep veins of the leg or pelvis (deep vein thrombosis, or DVT) are the most common cause. These blood clots can travel to the lungs. Pulmonary embolism can be very serious. Because you have had one pulmonary embolism, you are at greater risk for having another one. But you can take steps to prevent another pulmonary embolism. You will probably take a prescription blood-thinning medicine to prevent blood clots. A blood thinner can stop a blood clot from growing larger and prevent new clots from forming. Follow-up care is a key part of your treatment and safety. Be sure to make and go to all appointments, and call your doctor if you are having problems. It's also a good idea to know your test results and keep a list of the medicines you take. How can you care for yourself at home? · Take your medicines exactly as prescribed. Call your doctor if you think you are having a problem with your medicine. You will get more details on the specific medicines your doctor prescribes. · If you are taking a blood thinner, be sure you get instructions about how to take your medicine safely. Blood thinners can cause serious bleeding problems. · Try to walk several times a day. Walking helps keep blood moving in your legs. Before doing other types of exercise, ask your doctor what type and level of exercise is safe for you. · Take steps to help prevent blood clots in your legs. For example:  ? Exercise your lower leg muscles if you sit for long periods of time. Pump your feet up and down by pulling your toes up toward your knees then pointing them down. Repeat. ? After an illness or surgery, try to get up and out of bed often. If you can't get out of bed, flex your feet every hour to keep the blood moving through your legs. ? Take plenty of breaks when you travel. On long car trips, stop the car and walk around every hour or so.  On the bus, plane, or train, get out of your seat and walk up and down the aisle every hour, if you can.  ? Wear compression stockings if your doctor recommends them. ? Check with your doctor about whether you should use hormonal forms of birth control or hormone therapy. These may increase your risk of blood clots. · Have a healthy lifestyle. This includes being active, staying at a healthy weight, and not smoking. When should you call for help? Call 911 anytime you think you may need emergency care. For example, call if:    · You have shortness of breath.     · You have chest pain.     · You passed out (lost consciousness).     · You cough up blood. Call your doctor now or seek immediate medical care if:    · You have new or worsening pain or swelling in your leg. Watch closely for changes in your health, and be sure to contact your doctor if:    · You do not get better as expected. Where can you learn more? Go to http://www.gray.com/  Enter T353 in the search box to learn more about \"Pulmonary Embolism: Care Instructions. \"  Current as of: July 6, 2021               Content Version: 13.0  © 2006-2021 [employer]. Care instructions adapted under license by [employer] (which disclaims liability or warranty for this information). If you have questions about a medical condition or this instruction, always ask your healthcare professional. [place] [address] any warranty or liability for your use of this information. Patient Education        Atrial Fibrillation: Care Instructions  Your Care Instructions     Atrial fibrillation is an irregular and often fast heartbeat. Treating this condition is important for several reasons. It can cause blood clots, which can travel from your heart to your brain and cause a stroke. If you have a fast heartbeat, you may feel lightheaded, dizzy, and weak. An irregular heartbeat can also increase your risk for heart failure.   Atrial fibrillation is often the result of another heart condition, such as high blood pressure or coronary artery disease. Making changes to improve your heart condition will help you stay healthy and active. Follow-up care is a key part of your treatment and safety. Be sure to make and go to all appointments, and call your doctor if you are having problems. It's also a good idea to know your test results and keep a list of the medicines you take. How can you care for yourself at home? Medicines    · Take your medicines exactly as prescribed. Call your doctor if you think you are having a problem with your medicine. You will get more details on the specific medicines your doctor prescribes.     · If your doctor has given you a blood thinner to prevent a stroke, be sure you get instructions about how to take your medicine safely. Blood thinners can cause serious bleeding problems.     · Do not take any vitamins, over-the-counter drugs, or herbal products without talking to your doctor first.   Lifestyle changes    · Do not smoke. Smoking can increase your chance of a stroke and heart attack. If you need help quitting, talk to your doctor about stop-smoking programs and medicines. These can increase your chances of quitting for good.     · Eat a heart-healthy diet.     · Stay at a healthy weight. Lose weight if you need to.     · Limit alcohol to 2 drinks a day for men and 1 drink a day for women. Too much alcohol can cause health problems.     · Avoid colds and flu. Get a pneumococcal vaccine shot. If you have had one before, ask your doctor whether you need another dose. Get a flu shot every year. If you must be around people with colds or flu, wash your hands often. Activity    · If your doctor recommends it, get more exercise. Walking is a good choice. Bit by bit, increase the amount you walk every day. Try for at least 30 minutes on most days of the week. You also may want to swim, bike, or do other activities.  Your doctor may suggest that you join a cardiac rehabilitation program so that you can have help increasing your physical activity safely.     · Start light exercise if your doctor says it is okay. Even a small amount will help you get stronger, have more energy, and manage stress. Walking is an easy way to get exercise. Start out by walking a little more than you did in the hospital. Gradually increase the amount you walk.     · When you exercise, watch for signs that your heart is working too hard. You are pushing too hard if you cannot talk while you are exercising. If you become short of breath or dizzy or have chest pain, sit down and rest immediately.     · Check your pulse regularly. Place two fingers on the artery at the palm side of your wrist, in line with your thumb. If your heartbeat seems uneven or fast, talk to your doctor. When should you call for help? Call 911 anytime you think you may need emergency care. For example, call if:    · You have symptoms of a heart attack. These may include:  ? Chest pain or pressure, or a strange feeling in the chest.  ? Sweating. ? Shortness of breath. ? Nausea or vomiting. ? Pain, pressure, or a strange feeling in the back, neck, jaw, or upper belly or in one or both shoulders or arms. ? Lightheadedness or sudden weakness. ? A fast or irregular heartbeat. After you call 911, the  may tell you to chew 1 adult-strength or 2 to 4 low-dose aspirin. Wait for an ambulance. Do not try to drive yourself.     · You have symptoms of a stroke. These may include:  ? Sudden numbness, tingling, weakness, or loss of movement in your face, arm, or leg, especially on only one side of your body. ? Sudden vision changes. ? Sudden trouble speaking. ? Sudden confusion or trouble understanding simple statements. ? Sudden problems with walking or balance. ? A sudden, severe headache that is different from past headaches.     · You passed out (lost consciousness). Call your doctor now or seek immediate medical care if:    · You have new or increased shortness of breath.   · You feel dizzy or lightheaded, or you feel like you may faint.     · Your heart rate becomes irregular.     · You can feel your heart flutter in your chest or skip heartbeats. Tell your doctor if these symptoms are new or worse. Watch closely for changes in your health, and be sure to contact your doctor if you have any problems. Where can you learn more? Go to http://www.gray.com/  Enter U020 in the search box to learn more about \"Atrial Fibrillation: Care Instructions. \"  Current as of: April 29, 2021               Content Version: 13.0  © 2935-3119 [employer]. Care instructions adapted under license by [employer] (which disclaims liability or warranty for this information). If you have questions about a medical condition or this instruction, always ask your healthcare professional. Haritha Amezquita any warranty or liability for your use of this information.

## 2022-02-17 NOTE — PROGRESS NOTES
Problem: Pressure Injury - Risk of  Goal: *Prevention of pressure injury  Description: Document Chase Scale and appropriate interventions in the flowsheet. Outcome: Progressing Towards Goal  Note: Pressure Injury Interventions:  Sensory Interventions: Assess changes in LOC,Assess need for specialty bed    Moisture Interventions: Absorbent underpads,Apply protective barrier, creams and emollients    Activity Interventions: Assess need for specialty bed,Chair cushion    Mobility Interventions: Assess need for specialty bed,Chair cushion    Nutrition Interventions: Document food/fluid/supplement intake,Discuss nutritional consult with provider    Friction and Shear Interventions: Apply protective barrier, creams and emollients,Feet elevated on foot rest                Problem: Falls - Risk of  Goal: *Absence of Falls  Description: Document Jovanna Litter Fall Risk and appropriate interventions in the flowsheet.   Outcome: Progressing Towards Goal  Note: Fall Risk Interventions:  Mobility Interventions: Assess mobility with egress test,Bed/chair exit alarm         Medication Interventions: Assess postural VS orthostatic hypotension,Bed/chair exit alarm    Elimination Interventions: Bed/chair exit alarm,Call light in reach,Patient to call for help with toileting needs,Toileting schedule/hourly rounds              Problem: Nutrition Deficit  Goal: *Optimize nutritional status  Outcome: Progressing Towards Goal

## 2022-02-17 NOTE — PROGRESS NOTES
[facility] Progress Note           Annabella Baca DO    [phone] Office  [phone]  Fax    2022      Assessment & Plan:   · Suspected pelvic abscess. Status post IR drainage 2022. Cultures showing yeast on Gram stain. No growth at 4 days. Continue fluconazole and piperacillin-tazobactam.  Okay for discharge on Augmentin 875 mg p.o. twice daily and fluconazole 400 mg p.o. daily x7 days. Discussed with surgery  · Right hip fluid collection  Thought to be expected postoperative changes per orthopedic surgery. No intervention planned   · Leukocytosis. Resolved. Follow trend    · Pulmonary embolus. Management per primary team   · LLL airspace disease. Patient asymptomatic covered for possible aspiration on current antibiotics  ·   Perforated gastric ulcer status post open repair with [place] patch 2022  · Recent right TKA 2022  · Anemia. Hematology following          Subjective:     Anxious for discharge    Objective:     Vitals:   Visit Vitals  BP (!) 140/70 (BP 1 Location: Left lower arm, BP Patient Position: At rest)   Pulse (!) 104   Temp 98 °F (36.7 °C)   Resp 16   Ht 5' 6\" (1.676 m)   Wt 199 lb 9.6 oz (90.5 kg)   SpO2 95%   BMI 32.22 kg/m²        Tmax:  Temp (24hrs), Av.2 °F (36.8 °C), Min:97.8 °F (36.6 °C), Max:98.7 °F (37.1 °C)      Exam:   Patient is intubated:  no    Physical Examination:   General:  Alert, cooperative, no distress   Head:  Normocephalic, atraumatic. Eyes:  Conjunctivae clear   Neck: Supple       Lungs:   No distress. Chest wall:     Heart:     Abdomen:    Nondistended. Pelvic drain with scant serous fluid   Extremities: Moves all. Skin:  No rash   Neurologic: CNII-XII intact. Normal strength     Labs:        No lab exists for component: ITNL   No results for input(s): CPK, CKMB, TROIQ in the last 72 hours.   Recent Labs     22  0323 22  0731 22  0413 02/15/22  1034 02/15/22  0021 02/15/22  0021   NA 142  --  139  --   --  142   K 3.7 2.9* 2.8*  --    < > 3.4*     --  105  --   --  108   CO2 28  --  29  --   --  27   BUN 11  --  9  --   --  9   CREA 0.79  --  0.80  --   --  0.67     --  108*  --   --  85   ALB  --   --  1.9*  --   --   --    WBC 8.0  --  9.4  --   --  9.5   HGB 8.3*  --  7.9* 8.7*   < > 6.9*   HCT 28.2*  --  24.1* 26.5*   < > 21.7*   *  --  561*  --   --  549*    < > = values in this interval not displayed. No results for input(s): INR, PTP, APTT, INREXT, INREXT in the last 72 hours.   Needs: urine analysis, urine sodium, protein and creatinine  No results found for: SHAYLEE, CREAU      Cultures:     No results found for: SDES  Lab Results   Component Value Date/Time    Culture result:  02/14/2022 03:30 PM     NO GROWTH THUS FAR Culture performed on Unspun Fluid    Culture result: NO GROWTH THUS FAR 02/14/2022 03:30 PM    Culture result: NO GROWTH 5 DAYS 02/04/2022 02:55 PM       Radiology:     Medications       Current Facility-Administered Medications   Medication Dose Route Frequency Last Admin    0.9% sodium chloride infusion 250 mL  250 mL IntraVENous PRN      piperacillin-tazobactam (ZOSYN) 3.375 g in 0.9% sodium chloride (MBP/ADV) 100 mL MBP  3.375 g IntraVENous Q8H 3.375 g at 02/17/22 0500    iron sucrose (VENOFER) 200 mg in 0.9% sodium chloride 100 mL IVPB  200 mg IntraVENous DAILY 200 mg at 02/15/22 1613    acetaminophen (TYLENOL) tablet 650 mg  650 mg Oral Q4H  mg at 02/14/22 0138    pantoprazole (PROTONIX) tablet 40 mg  40 mg Oral Q12H 40 mg at 02/17/22 0821    enoxaparin (LOVENOX) injection 100 mg  100 mg SubCUTAneous Q12H 100 mg at 02/17/22 0200    hydrALAZINE (APRESOLINE) 20 mg/mL injection 20 mg  20 mg IntraVENous Q6H PRN 20 mg at 02/13/22 0101    ALPRAZolam (XANAX) tablet 0.25 mg  0.25 mg Oral Q8H PRN 0.25 mg at 02/13/22 1201    oxyCODONE IR (ROXICODONE) tablet 5 mg  5 mg Oral Q4H PRN      morphine injection 2 mg  2 mg IntraVENous Q3H PRN  acetaminophen (TYLENOL) suppository 650 mg  650 mg Rectal Q6H  mg at 02/06/22 0117    0.9% sodium chloride infusion 250 mL  250 mL IntraVENous PRN      LORazepam (ATIVAN) injection 0.5 mg  0.5 mg IntraVENous Q12H PRN 0.5 mg at 02/06/22 0117    fluconazole (DIFLUCAN) 400mg/200 mL IVPB (premix)  400 mg IntraVENous Q24H 400 mg at 02/16/22 2124           Case discussed with: General surgery      Jia Goodman DO

## 2022-02-18 LAB
BACTERIA SPEC CULT: NORMAL
GRAM STN SPEC: NORMAL
GRAM STN SPEC: NORMAL
SERVICE CMNT-IMP: NORMAL
SERVICE CMNT-IMP: NORMAL

## 2022-02-22 ENCOUNTER — DOCUMENTATION ONLY (OUTPATIENT)
Dept: ONCOLOGY | Age: 67
End: 2022-02-22

## 2022-02-22 ENCOUNTER — OFFICE VISIT (OUTPATIENT)
Dept: ONCOLOGY | Age: 67
End: 2022-02-22
Payer: COMMERCIAL

## 2022-02-22 ENCOUNTER — HOSPITAL ENCOUNTER (OUTPATIENT)
Dept: INFUSION THERAPY | Age: 67
Discharge: HOME | End: 2022-02-22
Payer: COMMERCIAL

## 2022-02-22 VITALS
WEIGHT: 191 LBS | OXYGEN SATURATION: 98 % | SYSTOLIC BLOOD PRESSURE: 147 MMHG | HEART RATE: 110 BPM | DIASTOLIC BLOOD PRESSURE: 67 MMHG | BODY MASS INDEX: 30.83 KG/M2 | TEMPERATURE: 97.7 F | RESPIRATION RATE: 18 BRPM

## 2022-02-22 VITALS
BODY MASS INDEX: 30.68 KG/M2 | DIASTOLIC BLOOD PRESSURE: 67 MMHG | RESPIRATION RATE: 18 BRPM | HEART RATE: 110 BPM | OXYGEN SATURATION: 98 % | WEIGHT: 190.92 LBS | HEIGHT: 66 IN | SYSTOLIC BLOOD PRESSURE: 147 MMHG | TEMPERATURE: 97.7 F

## 2022-02-22 DIAGNOSIS — K25.5 CHRONIC GASTRIC ULCER WITH PERFORATION (HCC): ICD-10-CM

## 2022-02-22 DIAGNOSIS — D64.9 NORMOCYTIC ANEMIA: Primary | ICD-10-CM

## 2022-02-22 DIAGNOSIS — I26.99 OTHER ACUTE PULMONARY EMBOLISM WITHOUT ACUTE COR PULMONALE (HCC): Primary | ICD-10-CM

## 2022-02-22 DIAGNOSIS — Z79.899 HIGH RISK MEDICATION USE: ICD-10-CM

## 2022-02-22 DIAGNOSIS — D64.9 NORMOCYTIC ANEMIA: ICD-10-CM

## 2022-02-22 LAB
ABO + RH BLD: NORMAL
BASOPHILS # BLD: 0.1 K/UL (ref 0–0.1)
BASOPHILS NFR BLD: 1 % (ref 0–1)
BLOOD GROUP ANTIBODIES SERPL: NORMAL
DIFFERENTIAL METHOD BLD: ABNORMAL
EOSINOPHIL # BLD: 0 K/UL (ref 0–0.4)
EOSINOPHIL NFR BLD: 0 % (ref 0–7)
ERYTHROCYTE [DISTWIDTH] IN BLOOD BY AUTOMATED COUNT: 13 % (ref 11.5–14.5)
HCT VFR BLD AUTO: 30.3 % (ref 35–47)
HGB BLD-MCNC: 9.3 G/DL (ref 11.5–16)
IMM GRANULOCYTES # BLD AUTO: 0 K/UL (ref 0–0.04)
IMM GRANULOCYTES NFR BLD AUTO: 0 % (ref 0–0.5)
LYMPHOCYTES # BLD: 1.6 K/UL (ref 0.8–3.5)
LYMPHOCYTES NFR BLD: 20 % (ref 12–49)
MCH RBC QN AUTO: 29 PG (ref 26–34)
MCHC RBC AUTO-ENTMCNC: 30.7 G/DL (ref 30–36.5)
MCV RBC AUTO: 94.4 FL (ref 80–99)
MONOCYTES # BLD: 0.5 K/UL (ref 0–1)
MONOCYTES NFR BLD: 6 % (ref 5–13)
NEUTS SEG # BLD: 5.8 K/UL (ref 1.8–8)
NEUTS SEG NFR BLD: 73 % (ref 32–75)
NRBC # BLD: 0 K/UL (ref 0–0.01)
NRBC BLD-RTO: 0 PER 100 WBC
PLATELET # BLD AUTO: 602 K/UL (ref 150–400)
PMV BLD AUTO: 9.9 FL (ref 8.9–12.9)
RBC # BLD AUTO: 3.21 M/UL (ref 3.8–5.2)
RBC MORPH BLD: ABNORMAL
SPECIMEN EXP DATE BLD: NORMAL
WBC # BLD AUTO: 8 K/UL (ref 3.6–11)

## 2022-02-22 PROCEDURE — 99214 OFFICE O/P EST MOD 30 MIN: CPT | Performed by: INTERNAL MEDICINE

## 2022-02-22 PROCEDURE — 36415 COLL VENOUS BLD VENIPUNCTURE: CPT

## 2022-02-22 PROCEDURE — 85025 COMPLETE CBC W/AUTO DIFF WBC: CPT

## 2022-02-22 PROCEDURE — 86900 BLOOD TYPING SEROLOGIC ABO: CPT

## 2022-02-22 RX ORDER — SODIUM CHLORIDE 0.9 % (FLUSH) 0.9 %
5-10 SYRINGE (ML) INJECTION AS NEEDED
Status: CANCELLED | OUTPATIENT
Start: 2022-02-22

## 2022-02-22 RX ORDER — SODIUM CHLORIDE 9 MG/ML
10 INJECTION INTRAMUSCULAR; INTRAVENOUS; SUBCUTANEOUS AS NEEDED
Status: CANCELLED | OUTPATIENT
Start: 2022-02-22

## 2022-02-22 RX ORDER — SODIUM CHLORIDE 0.9 % (FLUSH) 0.9 %
5-10 SYRINGE (ML) INJECTION AS NEEDED
Status: DISPENSED | OUTPATIENT
Start: 2022-02-22 | End: 2022-02-22

## 2022-02-22 RX ORDER — SODIUM CHLORIDE 9 MG/ML
10 INJECTION INTRAMUSCULAR; INTRAVENOUS; SUBCUTANEOUS AS NEEDED
Status: ACTIVE | OUTPATIENT
Start: 2022-02-22 | End: 2022-02-22

## 2022-02-22 RX ORDER — HEPARIN 100 UNIT/ML
500 SYRINGE INTRAVENOUS AS NEEDED
Status: ACTIVE | OUTPATIENT
Start: 2022-02-22 | End: 2022-02-22

## 2022-02-22 RX ORDER — HEPARIN 100 UNIT/ML
500 SYRINGE INTRAVENOUS AS NEEDED
Status: CANCELLED | OUTPATIENT
Start: 2022-02-22

## 2022-02-22 NOTE — PROGRESS NOTES
[facility] Lab Visit:    3661 Pt arrived ambulatory and in no distress, labs drawn I stick in left [facility] PER kk . Departed [facility] ambulatory and in no distress. Visit Vitals  BP (!) 147/67   Pulse (!) 110   Temp 97.7 °F (36.5 °C)   Resp 18   Wt 86.6 kg (191 lb)   SpO2 98%   BMI 30.83 kg/m²       Labs available in CC once resulted.

## 2022-02-22 NOTE — PROGRESS NOTES
Please call patient and relay that Cardiologist, Dr. Bernadine Martinez noted it is okay for patient to stop her aspirin use while on Eliquis with respect to her Atrial Fibrillation. Also to avoid any NSAID medications with her gastric ulcer perforation history.   Thanks,  Dr. Inna Vázquez

## 2022-02-22 NOTE — PROGRESS NOTES
-I was able to speak with Dr. Tyree Gates and he agreed for patient to stop aspirin while on eliquis for the A fib RVR. Patient will see pcp today. I will have our office contact Nani and let her know to stop taking the aspirin and make sure again that she does not take any other NSAID's. Patient needs to establish with cardiology for the A Fib management; she will discuss with Dr. Georgina Harris today.     Thank you,    Dr. Naresh Mills    CC:  MD Dr. Enzo Herrera MD

## 2022-02-22 NOTE — PATIENT INSTRUCTIONS
Please have your CBC repeated in one month as your hemoglobin is improving but still not normal yet. Lab Results   Component Value Date/Time    WBC 8.0 02/22/2022 10:53 AM    HGB 9.3 (L) 02/22/2022 10:53 AM    HCT 30.3 (L) 02/22/2022 10:53 AM    PLATELET 368 (H) 91/50/5228 10:53 AM    MCV 94.4 02/22/2022 10:53 AM     I have reached out to Dr. Tommy Aguero and will try again regarding if you should truly be on both eliquis AND aspirin with your gastric ulcer history. Otherwise, we will be set to see you in May to further discussion your anticoagulation and pulmonary emboli history. I wish you continued recovery. Since you do not have [employer] I have printed today's visit for you to take with your PCP. Please contact us if any new questions or concerns. Non-Urgent Matters: Call our clinic at [phone] during open clinic hours. Please note that [employer] Messages may not be immediately returned. Urgent Matters: Call hospital  ([address], or [facility]/[place]) at night or on weekends for questions that cannot wait until clinic opens. Emergency: Call 9-1-1.     Nichelle Moirllo MD  Hematology/Medical Oncology Provider  [facility]  P: [phone]

## 2022-02-22 NOTE — PROGRESS NOTES
[address] at [place]  [phone]    02/22/22 1:21 PM - Patient has been called. Please see most recent result note.

## 2022-02-22 NOTE — LETTER
2/22/2022    Patient: Joselin Orozco   YOB: 1955   Date of Visit: 2/22/2022     Fabián Schaffer MD  [address]  Via Fax: [phone]    Dear Fabián Schaffer MD,      Thank you for referring Ms. Nani Murray to [facility] [address] [facility] for evaluation. My notes for this consultation are attached. If you have questions, please do not hesitate to call me. I look forward to following your patient along with you.       Sincerely,    Sheron Rose MD Yes

## 2022-02-22 NOTE — PROGRESS NOTES
[facility] at [facility]  [phone]    02/22/22 1:19 PM - Called and spoke with the patient. Advised, per Dr. Narciso Jaffe, he spoke with Dr. Nettie Winkler and it is okay for the patient to stop taking the aspirin while on Eliquis. Also advised the patient to avoid taking any NSAID medications. The patient voiced understanding and gratitude for the call. No further questions or concerns.

## 2022-02-22 NOTE — PROGRESS NOTES
Chief Complaint   Patient presents with    Follow-up     Nani Maximo Hernandez is a pleasant 75year old woman who presents as a hospital follow up.  She denies pain

## 2022-02-22 NOTE — PROGRESS NOTES
[facility] [place] [facility] [place] [address]  [address] [facility], [facility] [address]  Kellie Oliveiraan: [phone]  F: [phone] Patient ID  Name: Vincent Cope  YOB: 1955  MRN: [id number]  Referring Provider:   No referring provider defined for this encounter. Primary Care Provider:   Amarjit Gongora MD       HEMATOLOGY/MEDICAL ONCOLOGY  NOTE   Date of Visit: 02/22/22  Reason for Evaluation:     Chief Complaint   Patient presents with    Follow-up     Nani Pink is a pleasant 75year old woman who presents as a hospital follow up. She denies pain       Subjective:     History of Present Illness:     Sharon Pink is a 75 y.o. F who presents for a follow-up evaluation for Anemia and Pulmonary Embolism. She had a gastric ulcer perforation in early February 2022 after undergoing hip surgery in late January 2022. She also was treated for an intraabdominal abscess by Infectious Disease and her providers as an inpatient during her hospitalization. She also was diagnosed with a asymptomatic bilateral pulmonary emboli. Patient overall reports feeling improved. She is still on oral antibiotics with Augmentin now. Patient reports she is feeling stronger and moving more each day. She is set to start physical therapy for her hip. She reports receiving a good report from Ortho regarding her hip surgery. She reports she had xrays that showed that \"everything was good. \" Patient reports adequate oral intake of food and hydration. Patient denies any bowel or bladder problems. Patient denies any uncontrolled pain. Patient denies any shortness of breath. Patient reports fatigue. Patient reports using gait assistance with front wheeled walker.  -She denies any black or bloody stools.     Past Medical History:   Diagnosis Date    Anxiety     COVID-19 vaccine administered     boostered   Pfizer    White coat syndrome without diagnosis of hypertension       Past Surgical History: Procedure Laterality Date    HX COLONOSCOPY  09/2021    HX DILATION AND CURETTAGE  1990    HX ORTHOPAEDIC        Social History     Tobacco Use    Smoking status: Never Smoker    Smokeless tobacco: Never Used   Substance Use Topics    Alcohol use: Yes     Comment: social      Family History   Problem Relation Age of Onset    Clotting Disorder Neg Hx      Current Outpatient Medications   Medication Sig    amoxicillin-clavulanate (AUGMENTIN) 875-125 mg per tablet Take 1 Tablet by mouth two (2) times a day for 7 days.  fluconazole (DIFLUCAN) 200 mg tablet Take 2 Tablets by mouth daily for 7 days. FDA advises cautious prescribing of oral fluconazole in pregnancy.  pantoprazole (PROTONIX) 40 mg tablet Take 1 Tablet by mouth daily for 60 days.  apixaban (ELIQUIS) 5 mg tablet Take 1 Tablet by mouth two (2) times a day for 30 days.  aspirin delayed-release 81 mg tablet Take 1 Tablet by mouth two (2) times a day.  ibuprofen (MOTRIN) 800 mg tablet Take 1 Tablet by mouth every six (6) hours as needed for Pain.  acetaminophen (Acetaminophen Pain Relief) 500 mg tablet Take 2 Tablets by mouth every six (6) hours as needed for Pain.  ondansetron (ZOFRAN ODT) 8 mg disintegrating tablet Take 0.5 Tablets by mouth every eight (8) hours as needed for Nausea.  efinaconazole (Jublia) rosendo topical solution Apply  to affected area daily.  multivitamin (ONE A DAY) tablet Take 1 Tablet by mouth daily. No current facility-administered medications for this visit. Facility-Administered Medications Ordered in Other Visits   Medication Dose Route Frequency    sodium chloride (NS) flush 5-10 mL  5-10 mL IntraVENous PRN    0.9% sodium chloride injection 10 mL  10 mL IntraVENous PRN    heparin (porcine) pf 500 Units  500 Units IntraVENous PRN      No Known Allergies     Review of Systems Provided by:  Patient  Review of Systems: A complete review of systems was obtained, reviewed.   Pertinent findings reviewed above. Objective:     Visit Vitals  BP (!) 147/67   Pulse (!) 110   Temp 97.7 °F (36.5 °C)   Resp 18   Ht 5' 6\" (1.676 m)   Wt 190 lb 14.7 oz (86.6 kg)   SpO2 98%   BMI 30.81 kg/m²     ECOG PS: 2- Ambulatory and capable of all selfcare but unable to carry out any work activities; up and about more than 50% of waking hours. Physical Exam  Constitutional: No acute distress. and Non-toxic appearance. HENT: Normocephalic and atraumatic head. and Wearing a mask during COVID-19 precautions. Eyes: Normal Conjunctivae. Anicteric sclerae. Cardiovascular: S1,S2 auscultated. Trace edema present. No friction rub. No gallops auscultated. Trace edema in right leg. Pulmonary: Normal Respiratory Effort. No wheezing. No rhonchi. No rales. Abdominal: Normal bowel sounds. Soft Abdomen to palpation. No abdominal tenderness. No guarding. Skin: No jaundice. No rash. No petechiae. Musculoskeletal: No muscle pain on palpation. No temporal muscle wasting on inspection. Lymph: No cervical lymph node enlargement or tenderness. Neurological: Alert and oriented. No tremor on inspection. Abnormal Gait. Using a front wheeled walker. Psychiatric: mood normal. normal speech rate normal affect    Results:     Lab Results   Component Value Date/Time    WBC 8.0 02/22/2022 10:53 AM    HGB 9.3 (L) 02/22/2022 10:53 AM    HCT 30.3 (L) 02/22/2022 10:53 AM    PLATELET 981 (H) 24/95/1434 10:53 AM    MCV 94.4 02/22/2022 10:53 AM    ABS.  NEUTROPHILS PENDING 02/22/2022 10:53 AM     Lab Results   Component Value Date/Time    Sodium 142 02/17/2022 03:23 AM    Potassium 3.7 02/17/2022 03:23 AM    Chloride 108 02/17/2022 03:23 AM    CO2 28 02/17/2022 03:23 AM    Glucose 100 02/17/2022 03:23 AM    BUN 11 02/17/2022 03:23 AM    Creatinine 0.79 02/17/2022 03:23 AM    GFR est AA >60 02/17/2022 03:23 AM    GFR est non-AA >60 02/17/2022 03:23 AM    Calcium 7.7 (L) 02/17/2022 03:23 AM    Glucose (POC) 113 02/13/2022 12:31 PM     Lab Results Component Value Date/Time    Bilirubin, total 0.4 02/16/2022 04:13 AM    ALT (SGPT) 21 02/16/2022 04:13 AM    Alk. phosphatase 70 02/16/2022 04:13 AM    Protein, total 5.7 (L) 02/16/2022 04:13 AM    Albumin 1.9 (L) 02/16/2022 04:13 AM    Globulin 3.8 02/16/2022 04:13 AM       XR PELV AP ONLY  Result Date: 1/31/2022  Pelvis 2 views dated 1/31/2022 COMPARISON: None. History is status post surgery A single frontal view of the pelvis and the proximal right femur were obtained. There has been recent placement of a hip prosthesis. Recent placement of a right hip prosthesis. CT CHEST ABD PELV W CONT  Result Date: 2/10/2022  1. Right upper and lower lobe pulmonary emboli. 2. Slight increase in size of rim-enhancing complex collection in the cul-de-sac of the pelvis concerning for abscess. 3. Gas and fluid containing collection in the soft tissues anterior to the right hip concerning for abscess. 4. Increased left greater than right pleural effusions. 5. Additional findings as above. The findings were called to patient's nurse, Luis Felipe, on 2/10/2022 at 10:54 by myself. [phone]    CT ABD PELV W CONT  Result Date: 2/4/2022  Findings compatible with gastric perforation, with free intraperitoneal air and fluid. The findings were called to Bradley Moore on 2/4/2022 at 2:11 PM by myself. 789    Assessment and Recommendations:     Diagnoses and all orders for this visit:    Other acute pulmonary embolism without acute cor pulmonale (HCC)  -Discussed with patient that at present it seems that her clots could be provoked from her sedentary state recovering from hip surgery and the hip surgery itself. She continues to progress and is going to start physical therapy soon. She notes improvement each day with her activity level.   We discussed that we will reassess at about the 3-month praneeth from her anticoagulation management to determine if this seems like she still has a persistent provoked state or if this was temporarily related to her recent surgeries and illness. Normocytic anemia  -Awaiting CBC testing today. Prelim has Hgb at 9g/dL. If she shows continued improvement on a repeat around 3/22/22 near her normal pre-illness state (hgb was normal at 12 at that time) then I think it is fine for her to follow-up with us in early May 2022.  -She understood that I cannot exclude a future need for a bone marrow biopsy in the future but it seems that her anemia could be multifactorial related to her illness/surgeries. Chronic gastric ulcer with perforation ([address]  -Patient reports having used NSAIDs with her hip pain and recovery. She also is reportedly on aspirin (twice a day) concomitantly for clot prevention postoperatively. -She understands not to take any NSAIDs. She reports that her pain is managed well. She is on aspirin and we will consult with cardiology regarding if this is absolutely necessary amid her history of her gastric ulcer perforation since she would be at future risk of bleeding on aspirin and Eliquis (especially with twice daily aspirin). I have asked her to hold her evening aspirin today until this issue is clarified. High risk medication use  -Patient continues on Eliquis 5 mg every 12 hours. She denies any bleeding events. We discussed that we will consult with cardiology to determine if she truly needs to be on aspirin while on Eliquis due to her gastric ulcer that was recently found to be perforated on imaging in early February.  -Patient had a curbside consult with Dr. Ericka Ruiz about her reported atrial fibrillation.   She will see her primary care provider today to discuss with Dr. Sarah De La Vega about a cardiology referral.  I reminded patient that if she is found in the future to have the ongoing recommendation to continue anticoagulation for atrial fibrillation then it must be noted that she has a history of a perforated gastric ulcer and the decision about any concomitant aspirin use should be weighed heavily regarding risk-benefit ratio. Follow-up and Dispositions    · Return in about 10 weeks (around 5/3/2022). Have CBC repeated in one month. If with PCP then we need any non-urgent results faxed to us and any urgent results called to us by phone. Any emergencies by 9-1-1. Please contact us if any new questions or concerns. Non-Urgent Matters: Call our clinic at [phone] during open clinic hours. Please note that [employer] Messages may not be immediately returned. Urgent Matters: Call hospital  ([address], or [facility]/[place]) at night or on weekends for questions that cannot wait until clinic opens. Emergency: Call 9-1-1.     Migue Pereira MD  Hematology/Medical Oncology Provider  [facility]  P: [phone]       Signed By:   Debra Kirby MD

## 2022-03-18 PROBLEM — K25.9 GASTRIC ULCER: Status: ACTIVE | Noted: 2022-02-04

## 2022-03-19 PROBLEM — Z79.899 HIGH RISK MEDICATION USE: Status: ACTIVE | Noted: 2022-02-22

## 2022-03-19 PROBLEM — D64.9 NORMOCYTIC ANEMIA: Status: ACTIVE | Noted: 2022-02-15

## 2022-03-19 PROBLEM — I26.99 OTHER PULMONARY EMBOLISM WITHOUT ACUTE COR PULMONALE (HCC): Status: ACTIVE | Noted: 2022-02-15

## 2022-03-19 PROBLEM — M16.11 PRIMARY OSTEOARTHRITIS OF RIGHT HIP: Status: ACTIVE | Noted: 2022-01-31

## 2022-03-23 DIAGNOSIS — D64.9 NORMOCYTIC ANEMIA: ICD-10-CM

## 2022-03-24 LAB
ERYTHROCYTE [DISTWIDTH] IN BLOOD BY AUTOMATED COUNT: 13.6 % (ref 11.5–14.5)
HCT VFR BLD AUTO: 32.7 % (ref 35–47)
HGB BLD-MCNC: 9.8 G/DL (ref 11.5–16)
MCH RBC QN AUTO: 29 PG (ref 26–34)
MCHC RBC AUTO-ENTMCNC: 30 G/DL (ref 30–36.5)
MCV RBC AUTO: 96.7 FL (ref 80–99)
NRBC # BLD: 0 K/UL (ref 0–0.01)
NRBC BLD-RTO: 0 PER 100 WBC
PLATELET # BLD AUTO: 405 K/UL (ref 150–400)
PMV BLD AUTO: 10.1 FL (ref 8.9–12.9)
RBC # BLD AUTO: 3.38 M/UL (ref 3.8–5.2)
WBC # BLD AUTO: 8.8 K/UL (ref 3.6–11)

## 2022-03-30 NOTE — PROGRESS NOTES
[address] at [facility]  [phone]    03/30/22 2:51 PM - Called and spoke with the patient. Patient's ID verified x 2. Advised, per Dr. Tasha Rodriguez, her hemoglobin is continuing to slowly improve. She is to keep her follow-up appointment as scheduled. The patient voiced understanding and gratitude for the call. No further questions or concerns.

## 2022-03-30 NOTE — PROGRESS NOTES
Hemoglobin continues to slowly improve. Keep follow-up as scheduled.     Thanks,  Dr. Abram Ascencio

## 2022-03-30 NOTE — PROGRESS NOTES
[address] at [facility]  [phone]    03/30/22 2:17 PM - Attempted to call the patient. There was no answer. Left a voicemail for the patient to call back at their earliest convenience along with the phone number to our office.

## 2022-04-19 ENCOUNTER — OFFICE VISIT (OUTPATIENT)
Dept: CARDIOLOGY CLINIC | Age: 67
End: 2022-04-19
Payer: COMMERCIAL

## 2022-04-19 VITALS
SYSTOLIC BLOOD PRESSURE: 160 MMHG | OXYGEN SATURATION: 99 % | WEIGHT: 189 LBS | BODY MASS INDEX: 30.37 KG/M2 | DIASTOLIC BLOOD PRESSURE: 60 MMHG | HEART RATE: 118 BPM | HEIGHT: 66 IN

## 2022-04-19 DIAGNOSIS — I49.9 IRREGULAR HEART RATE: ICD-10-CM

## 2022-04-19 DIAGNOSIS — I26.99 PULMONARY EMBOLISM WITHOUT ACUTE COR PULMONALE, UNSPECIFIED CHRONICITY, UNSPECIFIED PULMONARY EMBOLISM TYPE (HCC): ICD-10-CM

## 2022-04-19 DIAGNOSIS — I48.0 PAF (PAROXYSMAL ATRIAL FIBRILLATION) (HCC): Primary | ICD-10-CM

## 2022-04-19 PROCEDURE — 99205 OFFICE O/P NEW HI 60 MIN: CPT | Performed by: SPECIALIST

## 2022-04-19 PROCEDURE — 93000 ELECTROCARDIOGRAM COMPLETE: CPT | Performed by: SPECIALIST

## 2022-04-19 RX ORDER — PANTOPRAZOLE SODIUM 40 MG/1
40 TABLET, DELAYED RELEASE ORAL DAILY
COMMUNITY

## 2022-04-19 RX ORDER — APIXABAN 5 MG/1
5 TABLET, FILM COATED ORAL 2 TIMES DAILY
COMMUNITY
Start: 2022-04-08 | End: 2022-06-08 | Stop reason: SDUPTHER

## 2022-04-19 NOTE — PROGRESS NOTES
Catracho Enriquez MD. [facility]              Patient: Brennan Pack  : 1955      Today's Date: 2022          HISTORY OF PRESENT ILLNESS:     History of Present Illness:  Referred for Afib noted during  hospitalization - on Eliquis for PE     She had a prolonged hospitalization after hip replacement 22 - p/w abd pain to ED 22 and found to have a perforated gastric ulcer and then later aspiration PNA, acute PE and afib with RVR. Regarding Afib, she spont converted to NSR before discharge. She is feeling better and getting PT - getting stronger slowly. Denies prior history of MI, CHF, or Afib. She denies CP or heart racing. BP is OK at home. Prior records reviewed. PAST MEDICAL HISTORY:     Past Medical History:   Diagnosis Date    Anxiety     COVID-19 vaccine administered     boostered   Pfizer    History of hip replacement     22    Perforated [facility] ([facility]     22    Pulmonary embolism ([facility]     2/10/22    White coat syndrome without diagnosis of hypertension        Past Surgical History:   Procedure Laterality Date    HX COLONOSCOPY  2021    HX DILATION AND CURETTAGE      HX ORTHOPAEDIC           MEDICATIONS:     Current Outpatient Medications   Medication Sig Dispense Refill    Eliquis 5 mg tablet Take 5 mg by mouth two (2) times a day.  pantoprazole (PROTONIX) 40 mg tablet Take 40 mg by mouth daily.  multivitamin (ONE A DAY) tablet Take 1 Tablet by mouth daily.          No Known Allergies        SOCIAL HISTORY:     Social History     Tobacco Use    Smoking status: Never Smoker    Smokeless tobacco: Never Used   Substance Use Topics    Alcohol use: Yes     Comment: social    Drug use: Never         FAMILY HISTORY:     Family History   Problem Relation Age of Onset    Clotting Disorder Neg Hx            REVIEW OF SYMPTOMS:     Review of Symptoms:  Constitutional: Negative for fever, chills  HEENT: Negative for nosebleeds, tinnitus, and vision changes. Respiratory: Negative for cough, wheezing  Cardiovascular: Negative for orthopnea, claudication, syncope, and PND. Gastrointestinal: Negative for abdominal pain, diarrhea, melena. Genitourinary: Negative for dysuria  Musculoskeletal: Negative for myalgias. Skin: Negative for rash  Heme: No problems bleeding. Neurological: Negative for speech change and focal weakness. PHYSICAL EXAM:     Physical Exam:  Visit Vitals  BP (!) 160/60 (BP 1 Location: Right upper arm, BP Patient Position: Sitting)   Pulse (!) 118   Ht 5' 6\" (1.676 m)   Wt 189 lb (85.7 kg)   SpO2 99%   BMI 30.51 kg/m²     Patient appears generally well, mood and affect are appropriate and pleasant. HEENT:  Hearing intact, non-icteric, normocephalic, atraumatic. Neck Exam: Supple, No JVD   Lung Exam: Clear to auscultation, even breath sounds. Cardiac Exam: RRR with no murmur or rub  Abdomen: Soft, non-tender, normal bowel sounds. Extremities: Moves all ext well. No lower extremity edema. MSKTL: Overall good ROM ext  Skin: No significant rashes  Vascular: 2+ dorsalis pedis pulses bilaterally. Psych: Appropriate affect  Neuro - Grossly intact      LABS / OTHER STUDIES reviewed:       Lab Results   Component Value Date/Time    Sodium 142 02/17/2022 03:23 AM    Potassium 3.7 02/17/2022 03:23 AM    Chloride 108 02/17/2022 03:23 AM    CO2 28 02/17/2022 03:23 AM    Anion gap 6 02/17/2022 03:23 AM    Glucose 100 02/17/2022 03:23 AM    BUN 11 02/17/2022 03:23 AM    Creatinine 0.79 02/17/2022 03:23 AM    BUN/Creatinine ratio 14 02/17/2022 03:23 AM    GFR est AA >60 02/17/2022 03:23 AM    GFR est non-AA >60 02/17/2022 03:23 AM    Calcium 7.7 (L) 02/17/2022 03:23 AM    Bilirubin, total 0.4 02/16/2022 04:13 AM    Alk.  phosphatase 70 02/16/2022 04:13 AM    Protein, total 5.7 (L) 02/16/2022 04:13 AM    Albumin 1.9 (L) 02/16/2022 04:13 AM    Globulin 3.8 02/16/2022 04:13 AM    A-G Ratio 0.5 (L) 02/16/2022 04:13 AM    ALT (SGPT) 21 02/16/2022 04:13 AM    AST (SGOT) 16 02/16/2022 04:13 AM       Lab Results   Component Value Date/Time    WBC 8.8 03/23/2022 03:19 PM    HGB 9.8 (L) 03/23/2022 03:19 PM    HCT 32.7 (L) 03/23/2022 03:19 PM    PLATELET 610 (H) 92/62/1206 03:19 PM    MCV 96.7 03/23/2022 03:19 PM     No results found for: TSH, TSH2, TSH3, TSHP, TSHEXT, TSHEXT    No results found for: CHOL, CHOLPOCT, CHOLX, CHLST, CHOLV, TOTCHOLEXT, HDL, HDLPOC, HDLEXT, HDLP, LDL, LDLCPOC, LDLCEXT, LDLC, DLDLP, VLDLC, VLDL, TGLX, TRIGL, TRIGLYCEXT, TRIGP, TGLPOCT, CHHD, CHHDX          CARDIAC DIAGNOSTICS:     Cardiac Evaluation Includes:  I reviewed the results below. EKG 1/24/22 - sinus tach, PRWP   EKG 2/13/22 # 1- sinus tach   EKG 2/13/22 # 2 - Afib with RVR, NSST changes  EKG 4/19/22 - sinus tach, NSST changes     CTA chest 2/7/22 - findings include -  Mild coronary artery calcification. CTA chest 2/10/22 -  1. Right upper and lower lobe pulmonary emboli. 2. Slight increase in size of rim-enhancing complex collection in the cul-de-sac of the pelvis concerning for abscess. 3. Gas and fluid containing collection in the soft tissues anterior to the right hip concerning for abscess. 4. Increased left greater than right pleural effusions. Echo 2/14/22 - TDS. LVEF 70-75%             ASSESSMENT AND PLAN:     Assessment and Plan:    She had a prolonged hospitalization after hip replacement 1/31/22 - p/w abd pain to ED 2/4/22 and found to have a perforated gastric ulcer and then later aspiration PNA, acute PE and afib with RVR. Regarding Afib, she spont converted to NSR before discharge. 1) Transient Afib during hospital stay   - she had a prolonged hospital stay complicated with perforated gastric ulcer, aspiration PNA, and acute PE --> during this time she was noted to have Afib with RVR. She spont converted to NSR on Diltiazem.    - She returns to office 4/19/22 feeling better gradually   - EKG on 4/19/22 shows sinus tach   - It is possible that her transient Afib was brought on by her acute illness (including acute PE and PNA). She denies any further HR racing spells since discharge   - Will check a 30 day monitor to see if she has any more Afib   - Her  [id number] score is 2 (female and age)   - If there is no further Afib the next several weeks, then I will discuss the risks and benefits of stopping vs continuing [address]. We can stop [address] (after she completes PE course) if patient wants (understanding that there is risk Afib may return in the future). 2) Mild atherosclerosis on CT   - CTA chest 2/7/22 - findings include -  Mild coronary artery calcification.  - Will offer a statin for primary prevention at next visit     3) Dyslipidemia   - LDL was 113 in 5/2021     4) Thyroid nodule / mass   - seeing Endocrine     5) Acute PE   - She had a provoked PE in setting of recent surgeries   - plan for 3 months of [address] (defer to length of treatment to PCP)     6) White coat HTN  - BP OK at home despite being high in the office    7) Anemia   - multifactorial   - Seeing Dr. Josette Boast    8) See me back in 6 weeks. Laura Myers MD, [address]  Ph: [phone]   Ph [phone]        Total time (preparing to see the patient, reviewing data, seeing patient face to fine, counseling patient, documenting information, etc) was 65   min. ADDENDUM   6/7/2022  Event Monitor 5/1/22 - wore it 27 days. Primary NSR, Avg HR 82.   Afib occurred 46 times with HR , Avg 106 bpm - Afib burden 5%

## 2022-04-19 NOTE — PROGRESS NOTES
Chief Complaint   Patient presents with    New Patient    Irregular Heart Beat     follow up per PCP on A-fib. Visit Vitals  BP (!) 160/60 (BP 1 Location: Right upper arm, BP Patient Position: Sitting)   Pulse (!) 118   Ht 5' 6\" (1.676 m)   Wt 189 lb (85.7 kg)   SpO2 99%   BMI 30.51 kg/m²     Chest pain denied   SOB denied   Palpitations denied   Swelling in hands/feet denied   Dizziness denied   Recent hospital Hip replacement 1/22, abdomen surgery 2/22. Refills denied         Pt is very anxious she things her BP and pulse is genny because of that.

## 2022-05-02 ENCOUNTER — OFFICE VISIT (OUTPATIENT)
Dept: ONCOLOGY | Age: 67
End: 2022-05-02
Payer: COMMERCIAL

## 2022-05-02 VITALS
TEMPERATURE: 98.1 F | RESPIRATION RATE: 16 BRPM | HEART RATE: 110 BPM | OXYGEN SATURATION: 99 % | SYSTOLIC BLOOD PRESSURE: 169 MMHG | WEIGHT: 188 LBS | BODY MASS INDEX: 30.22 KG/M2 | HEIGHT: 66 IN | DIASTOLIC BLOOD PRESSURE: 83 MMHG

## 2022-05-02 DIAGNOSIS — D64.9 NORMOCYTIC ANEMIA: ICD-10-CM

## 2022-05-02 DIAGNOSIS — I26.99 OTHER ACUTE PULMONARY EMBOLISM WITHOUT ACUTE COR PULMONALE (HCC): Primary | ICD-10-CM

## 2022-05-02 DIAGNOSIS — K25.5 CHRONIC GASTRIC ULCER WITH PERFORATION (HCC): ICD-10-CM

## 2022-05-02 DIAGNOSIS — Z79.899 HIGH RISK MEDICATION USE: ICD-10-CM

## 2022-05-02 PROCEDURE — 99214 OFFICE O/P EST MOD 30 MIN: CPT | Performed by: INTERNAL MEDICINE

## 2022-05-02 NOTE — LETTER
5/2/2022    Patient: Anabel Lucia   YOB: 1955   Date of Visit: 5/2/2022     Jacqueline Stevenson MD  [facility] [address]  Via Fax: [phone]    Dear Jacqueline Stevenson MD,      Thank you for referring Ms. Nani Murray to [email or web address] for evaluation. My notes for this consultation are attached. If you have questions, please do not hesitate to call me. I look forward to following your patient along with you.       Sincerely,    Vernon Silvestre MD

## 2022-05-02 NOTE — PROGRESS NOTES
1. Have you been to the ER, urgent care clinic since your last visit? Hospitalized since your last visit? No    2. Have you seen or consulted any other health care providers outside of the [address] [place] [address] since your last visit? Include any pap smears or colon screening. Yes Gastroenterologist, follow-up after surgery, April 2022        Vitals:    05/02/22 1043   BP: (!) 169/83   Pulse: (!) 110   Resp: 16   Temp: 98.1 °F (36.7 °C)   SpO2: 99%   Weight: 188 lb (85.3 kg)   Height: 5' 6\" (1.676 m)           Chief Complaint   Patient presents with    Follow-up     Patient presents as a follow-up. She denies pain.

## 2022-05-02 NOTE — PROGRESS NOTES
[facility] [place] [address] [facility]  Mirian Turnerels: [phone]  F: [phone] Patient ID  Name: Maria Noriega  YOB: 1955  MRN: [id number]  Referring Provider:   No referring provider defined for this encounter. Primary Care Provider:   Nickie Mathis MD       HEMATOLOGY/MEDICAL ONCOLOGY  NOTE   Date of Visit: 05/02/22  Reason for Evaluation:     Chief Complaint   Patient presents with    Follow-up     Patient presents as a follow-up. She denies pain. Subjective:     History of Present Illness:     Lauren Ashton is a 75 y.o. F who presents for a follow-up evaluation for pulmonary embolism. She reportedly is improving. Still working with PT with her hip since she had an upfront delay. She notes that she is off aspirin and NSAID's. She will have a follow-up EGD in July 2022. She reports no bleeding or dark stools. Patient overall reports feeling improved. Reviewed orange symptom sheet; no specific complaints. She is anticipating that this may be her last month of physical therapy. Past Medical History:   Diagnosis Date    Anxiety     COVID-19 vaccine administered     boostered   Pfizer    History of hip replacement     1/31/22    Perforated gastric ulcer ([address]     2/4/22    Pulmonary embolism ([facility]     2/10/22    White coat syndrome without diagnosis of hypertension       Past Surgical History:   Procedure Laterality Date    HX COLONOSCOPY  09/2021    HX DILATION AND CURETTAGE  1990    HX ORTHOPAEDIC        Social History     Tobacco Use    Smoking status: Never Smoker    Smokeless tobacco: Never Used   Substance Use Topics    Alcohol use: Not Currently     Comment: social      Family History   Problem Relation Age of Onset    Clotting Disorder Neg Hx      Current Outpatient Medications   Medication Sig    Eliquis 5 mg tablet Take 5 mg by mouth two (2) times a day.     pantoprazole (PROTONIX) 40 mg tablet Take 40 mg by mouth daily.  multivitamin (ONE A DAY) tablet Take 1 Tablet by mouth daily. No current facility-administered medications for this visit. No Known Allergies     Review of Systems Provided by:  Patient  Review of Systems: A complete review of systems was obtained, reviewed. Pertinent findings reviewed above. Objective:     Visit Vitals  BP (!) 169/83   Pulse (!) 110   Temp 98.1 °F (36.7 °C)   Resp 16   Ht 5' 6\" (1.676 m)   Wt 188 lb (85.3 kg)   SpO2 99%   BMI 30.34 kg/m²     2- Ambulatory and capable of all selfcare but unable to carry out any work activities; up and about more than 50% of waking hours. Physical Exam  Constitutional: No acute distress. , Non-toxic appearance. and Non-diaphoretic. HENT: Normocephalic and atraumatic head. and Wearing a mask during COVID-19 precautions. Eyes: Normal Conjunctivae. Anicteric sclerae. Cardiovascular: S1,S2 auscultated. No pitting edema. No friction rub. No gallops auscultated. Pulmonary: Normal Respiratory Effort. No wheezing. No rhonchi. No rales. Abdominal: Normal bowel sounds. Soft Abdomen to palpation. No abdominal tenderness. Skin: No jaundice. No rash. Wearing an upper left heart monitor on skin. Musculoskeletal: No muscle pain on palpation. No temporal muscle wasting on inspection. Neurological: Alert and oriented. No tremor on inspection. Normal Gait. Psychiatric: mood normal. normal speech rate normal affect    Results:     Lab Results   Component Value Date/Time    WBC 8.8 03/23/2022 03:19 PM    HGB 9.8 (L) 03/23/2022 03:19 PM    HCT 32.7 (L) 03/23/2022 03:19 PM    PLATELET 152 (H) 19/60/7342 03:19 PM    MCV 96.7 03/23/2022 03:19 PM    ABS.  NEUTROPHILS 5.8 02/22/2022 10:53 AM     Lab Results   Component Value Date/Time    Sodium 142 02/17/2022 03:23 AM    Potassium 3.7 02/17/2022 03:23 AM    Chloride 108 02/17/2022 03:23 AM    CO2 28 02/17/2022 03:23 AM    Glucose 100 02/17/2022 03:23 AM    BUN 11 02/17/2022 03:23 AM    Creatinine 0.79 02/17/2022 03:23 AM    GFR est AA >60 02/17/2022 03:23 AM    GFR est non-AA >60 02/17/2022 03:23 AM    Calcium 7.7 (L) 02/17/2022 03:23 AM    Glucose (POC) 113 02/13/2022 12:31 PM     Lab Results   Component Value Date/Time    Bilirubin, total 0.4 02/16/2022 04:13 AM    ALT (SGPT) 21 02/16/2022 04:13 AM    Alk. phosphatase 70 02/16/2022 04:13 AM    Protein, total 5.7 (L) 02/16/2022 04:13 AM    Albumin 1.9 (L) 02/16/2022 04:13 AM    Globulin 3.8 02/16/2022 04:13 AM       Assessment and Recommendations:     1. Other acute pulmonary embolism without acute cor pulmonale (HCC)  Continue with anticoagulation with Eliquis 5mg every 12 hours. We discussed that if she does not have a cardiac indication for continuing Eliquis (will defer to cardiology). 2. Normocytic anemia  We discussed that I would recommend she have her CBC,Iron Panel, and Ferritin checked later this weke. 3. High risk medication use  Continue Eliquis 5mg every 12 hours for at least 6 months since she is still undergoing therapy with physical therapy. 4. Chronic gastric ulcer with perforation ([address]  She reports doing better while off nsaid's and especially off aspirin. She will have a follow-up EGD in July 2022. Since she has been treated for 3 months plus at that point, she likely can have her eliquis held for the procedure in standard fashion (hold Eliquis for 2 days prior,day of procedure) if a biopsy is anticipated. However, if no biopsies are anticipated to be taken then holding it one day prior may be reasonable. Follow-up and Dispositions    · Return in about 3 months (around 8/9/2022).          Ketty Fong MD  Hematology/Medical Oncology Provider  Tim Escamilla  P: [phone]       Signed By:   Lefty Lawler MD

## 2022-05-24 ENCOUNTER — TELEPHONE (OUTPATIENT)
Dept: CARDIOLOGY CLINIC | Age: 67
End: 2022-05-24

## 2022-05-24 ENCOUNTER — APPOINTMENT (OUTPATIENT)
Dept: GENERAL RADIOLOGY | Age: 67
End: 2022-05-24
Attending: EMERGENCY MEDICINE
Payer: COMMERCIAL

## 2022-05-24 ENCOUNTER — HOSPITAL ENCOUNTER (OUTPATIENT)
Age: 67
Setting detail: OBSERVATION
Discharge: HOME | End: 2022-05-25
Attending: EMERGENCY MEDICINE | Admitting: INTERNAL MEDICINE
Payer: COMMERCIAL

## 2022-05-24 DIAGNOSIS — I48.91 ATRIAL FIBRILLATION WITH RVR (HCC): Primary | ICD-10-CM

## 2022-05-24 LAB
ALBUMIN SERPL-MCNC: 3.5 G/DL (ref 3.5–5)
ALBUMIN/GLOB SERPL: 0.8 {RATIO} (ref 1.1–2.2)
ALP SERPL-CCNC: 176 U/L (ref 45–117)
ALT SERPL-CCNC: 25 U/L (ref 12–78)
ANION GAP SERPL CALC-SCNC: 7 MMOL/L (ref 5–15)
AST SERPL-CCNC: 22 U/L (ref 15–37)
BASOPHILS # BLD: 0 K/UL (ref 0–0.1)
BASOPHILS NFR BLD: 0 % (ref 0–1)
BILIRUB SERPL-MCNC: 0.3 MG/DL (ref 0.2–1)
BNP SERPL-MCNC: 764 PG/ML
BUN SERPL-MCNC: 24 MG/DL (ref 6–20)
BUN/CREAT SERPL: 30 (ref 12–20)
CALCIUM SERPL-MCNC: 9.2 MG/DL (ref 8.5–10.1)
CHLORIDE SERPL-SCNC: 109 MMOL/L (ref 97–108)
CO2 SERPL-SCNC: 25 MMOL/L (ref 21–32)
CREAT SERPL-MCNC: 0.8 MG/DL (ref 0.55–1.02)
D DIMER PPP FEU-MCNC: 0.39 MG/L FEU (ref 0–0.65)
DIFFERENTIAL METHOD BLD: ABNORMAL
EOSINOPHIL # BLD: 0 K/UL (ref 0–0.4)
EOSINOPHIL NFR BLD: 0 % (ref 0–7)
ERYTHROCYTE [DISTWIDTH] IN BLOOD BY AUTOMATED COUNT: 13.9 % (ref 11.5–14.5)
GLOBULIN SER CALC-MCNC: 4.5 G/DL (ref 2–4)
GLUCOSE SERPL-MCNC: 122 MG/DL (ref 65–100)
HCT VFR BLD AUTO: 38.2 % (ref 35–47)
HGB BLD-MCNC: 12.3 G/DL (ref 11.5–16)
IMM GRANULOCYTES # BLD AUTO: 0.1 K/UL (ref 0–0.04)
IMM GRANULOCYTES NFR BLD AUTO: 1 % (ref 0–0.5)
LYMPHOCYTES # BLD: 1.9 K/UL (ref 0.8–3.5)
LYMPHOCYTES NFR BLD: 19 % (ref 12–49)
MAGNESIUM SERPL-MCNC: 2.1 MG/DL (ref 1.6–2.4)
MCH RBC QN AUTO: 28.1 PG (ref 26–34)
MCHC RBC AUTO-ENTMCNC: 32.2 G/DL (ref 30–36.5)
MCV RBC AUTO: 87.4 FL (ref 80–99)
MONOCYTES # BLD: 0.4 K/UL (ref 0–1)
MONOCYTES NFR BLD: 4 % (ref 5–13)
NEUTS SEG # BLD: 7.4 K/UL (ref 1.8–8)
NEUTS SEG NFR BLD: 76 % (ref 32–75)
NRBC # BLD: 0 K/UL (ref 0–0.01)
NRBC BLD-RTO: 0 PER 100 WBC
PLATELET # BLD AUTO: 371 K/UL (ref 150–400)
PMV BLD AUTO: 9.4 FL (ref 8.9–12.9)
POTASSIUM SERPL-SCNC: 3.9 MMOL/L (ref 3.5–5.1)
PROT SERPL-MCNC: 8 G/DL (ref 6.4–8.2)
RBC # BLD AUTO: 4.37 M/UL (ref 3.8–5.2)
SODIUM SERPL-SCNC: 141 MMOL/L (ref 136–145)
TROPONIN-HIGH SENSITIVITY: 15 NG/L (ref 0–51)
TROPONIN-HIGH SENSITIVITY: 21 NG/L (ref 0–51)
TROPONIN-HIGH SENSITIVITY: 26 NG/L (ref 0–51)
TSH SERPL DL<=0.05 MIU/L-ACNC: 0.56 UIU/ML (ref 0.36–3.74)
WBC # BLD AUTO: 9.8 K/UL (ref 3.6–11)

## 2022-05-24 PROCEDURE — 93005 ELECTROCARDIOGRAM TRACING: CPT

## 2022-05-24 PROCEDURE — 83735 ASSAY OF MAGNESIUM: CPT

## 2022-05-24 PROCEDURE — 96375 TX/PRO/DX INJ NEW DRUG ADDON: CPT

## 2022-05-24 PROCEDURE — 74011000250 HC RX REV CODE- 250: Performed by: INTERNAL MEDICINE

## 2022-05-24 PROCEDURE — 84484 ASSAY OF TROPONIN QUANT: CPT

## 2022-05-24 PROCEDURE — 74011000258 HC RX REV CODE- 258: Performed by: EMERGENCY MEDICINE

## 2022-05-24 PROCEDURE — 99285 EMERGENCY DEPT VISIT HI MDM: CPT

## 2022-05-24 PROCEDURE — 80053 COMPREHEN METABOLIC PANEL: CPT

## 2022-05-24 PROCEDURE — 96376 TX/PRO/DX INJ SAME DRUG ADON: CPT

## 2022-05-24 PROCEDURE — 85025 COMPLETE CBC W/AUTO DIFF WBC: CPT

## 2022-05-24 PROCEDURE — 85379 FIBRIN DEGRADATION QUANT: CPT

## 2022-05-24 PROCEDURE — 84443 ASSAY THYROID STIM HORMONE: CPT

## 2022-05-24 PROCEDURE — 83880 ASSAY OF NATRIURETIC PEPTIDE: CPT

## 2022-05-24 PROCEDURE — APPSS30 APP SPLIT SHARED TIME 16-30 MINUTES: Performed by: NURSE PRACTITIONER

## 2022-05-24 PROCEDURE — 36415 COLL VENOUS BLD VENIPUNCTURE: CPT

## 2022-05-24 PROCEDURE — 74011250636 HC RX REV CODE- 250/636: Performed by: EMERGENCY MEDICINE

## 2022-05-24 PROCEDURE — 65270000046 HC RM TELEMETRY

## 2022-05-24 PROCEDURE — 71045 X-RAY EXAM CHEST 1 VIEW: CPT

## 2022-05-24 PROCEDURE — 74011000250 HC RX REV CODE- 250: Performed by: NURSE PRACTITIONER

## 2022-05-24 PROCEDURE — 99223 1ST HOSP IP/OBS HIGH 75: CPT | Performed by: INTERNAL MEDICINE

## 2022-05-24 PROCEDURE — G0378 HOSPITAL OBSERVATION PER HR: HCPCS

## 2022-05-24 PROCEDURE — 74011250637 HC RX REV CODE- 250/637: Performed by: INTERNAL MEDICINE

## 2022-05-24 PROCEDURE — 96365 THER/PROPH/DIAG IV INF INIT: CPT

## 2022-05-24 PROCEDURE — 74011000250 HC RX REV CODE- 250: Performed by: EMERGENCY MEDICINE

## 2022-05-24 RX ORDER — ONDANSETRON 4 MG/1
4 TABLET, ORALLY DISINTEGRATING ORAL
Status: DISCONTINUED | OUTPATIENT
Start: 2022-05-24 | End: 2022-05-25 | Stop reason: HOSPADM

## 2022-05-24 RX ORDER — SODIUM CHLORIDE 0.9 % (FLUSH) 0.9 %
5-40 SYRINGE (ML) INJECTION EVERY 8 HOURS
Status: DISCONTINUED | OUTPATIENT
Start: 2022-05-24 | End: 2022-05-25 | Stop reason: HOSPADM

## 2022-05-24 RX ORDER — PANTOPRAZOLE SODIUM 40 MG/1
40 TABLET, DELAYED RELEASE ORAL
Status: DISCONTINUED | OUTPATIENT
Start: 2022-05-25 | End: 2022-05-25 | Stop reason: HOSPADM

## 2022-05-24 RX ORDER — POLYETHYLENE GLYCOL 3350 17 G/17G
17 POWDER, FOR SOLUTION ORAL DAILY PRN
Status: DISCONTINUED | OUTPATIENT
Start: 2022-05-24 | End: 2022-05-25 | Stop reason: HOSPADM

## 2022-05-24 RX ORDER — METOPROLOL TARTRATE 5 MG/5ML
5 INJECTION INTRAVENOUS ONCE
Status: COMPLETED | OUTPATIENT
Start: 2022-05-24 | End: 2022-05-24

## 2022-05-24 RX ORDER — ONDANSETRON 2 MG/ML
4 INJECTION INTRAMUSCULAR; INTRAVENOUS
Status: DISCONTINUED | OUTPATIENT
Start: 2022-05-24 | End: 2022-05-25 | Stop reason: HOSPADM

## 2022-05-24 RX ORDER — ACETAMINOPHEN 325 MG/1
650 TABLET ORAL
Status: DISCONTINUED | OUTPATIENT
Start: 2022-05-24 | End: 2022-05-25 | Stop reason: HOSPADM

## 2022-05-24 RX ORDER — SODIUM CHLORIDE 0.9 % (FLUSH) 0.9 %
5-40 SYRINGE (ML) INJECTION AS NEEDED
Status: DISCONTINUED | OUTPATIENT
Start: 2022-05-24 | End: 2022-05-25 | Stop reason: HOSPADM

## 2022-05-24 RX ORDER — ACETAMINOPHEN 650 MG/1
650 SUPPOSITORY RECTAL
Status: DISCONTINUED | OUTPATIENT
Start: 2022-05-24 | End: 2022-05-25 | Stop reason: HOSPADM

## 2022-05-24 RX ORDER — DILTIAZEM HYDROCHLORIDE 5 MG/ML
20 INJECTION INTRAVENOUS ONCE
Status: COMPLETED | OUTPATIENT
Start: 2022-05-24 | End: 2022-05-24

## 2022-05-24 RX ADMIN — SODIUM CHLORIDE 7.5 MG/HR: 900 INJECTION, SOLUTION INTRAVENOUS at 15:25

## 2022-05-24 RX ADMIN — SODIUM CHLORIDE 2.5 MG/HR: 900 INJECTION, SOLUTION INTRAVENOUS at 11:36

## 2022-05-24 RX ADMIN — DILTIAZEM HYDROCHLORIDE 20 MG: 5 INJECTION INTRAVENOUS at 11:12

## 2022-05-24 RX ADMIN — Medication 10 ML: at 14:00

## 2022-05-24 RX ADMIN — APIXABAN 5 MG: 5 TABLET, FILM COATED ORAL at 17:28

## 2022-05-24 RX ADMIN — METOPROLOL TARTRATE 5 MG: 5 INJECTION INTRAVENOUS at 13:54

## 2022-05-24 RX ADMIN — SODIUM CHLORIDE 5 MG/HR: 900 INJECTION, SOLUTION INTRAVENOUS at 17:36

## 2022-05-24 NOTE — ED NOTES
Bedside shift change report given to [address] [place] [address] (oncoming nurse) by Maame Silva RN (offgoing nurse). Report included the following information SBAR.

## 2022-05-24 NOTE — PROGRESS NOTES
Problem: Falls - Risk of  Goal: *Absence of Falls  Description: Document Feliciano Culver Fall Risk and appropriate interventions in the flowsheet.   Outcome: Progressing Towards Goal  Note: Fall Risk Interventions:            Medication Interventions: Bed/chair exit alarm,Evaluate medications/consider consulting pharmacy,Patient to call before getting OOB                   Problem: Pain  Goal: *Control of Pain  Outcome: Progressing Towards Goal

## 2022-05-24 NOTE — ED TRIAGE NOTES
Pt arrives via [employer]. Pt states \"I went to my PCP this morning for a routine visit. I was told my heart rate was too fast and EMS was called. I do not have any symptoms I have had this once before and I do take blood thinners. \" Dr Denver Quivers in room.

## 2022-05-24 NOTE — PROGRESS NOTES
Bedside and Verbal shift change report given to HELIO Monge (oncoming nurse) by Wisam Evans (offgoing nurse). Report included the following information SBAR, Kardex, Intake/Output, MAR, Recent Results, Med Rec Status and Cardiac Rhythm afib.

## 2022-05-24 NOTE — H&P
[facility]  [address] [facility], [address] [place] [address]  [phone]    Admission History and Physical      NAME:  John Sousa   :   1955   MRN:  [id number]     PCP:  Khushboo Lui MD     Date/Time of service:  2022  5:04 PM        Subjective:     CHIEF COMPLAINT: Abnormal heart rate    HISTORY OF PRESENT ILLNESS:     Ms. Gera Anderson is a 75 y.o.  female with a past medical history of PE on Eliquis, atrial fibrillation, perforated gastric ulcer who is admitted with atrial fibrillation with RVR. Ms. Gera Anderson states that today she went to her primary care doctor at which time she was found to be in A. fib with RVR and sent to the emergency room. She denies any heart palpitations, lightheadedness, chest pain or shortness of breath. Of note, patient underwent a hip replacement in 9908 which was complicated by a PE for which she takes Eliquis. Then, in 2022, she presented to the emergency room with abdominal pain was found to have a gastric perforation for which she underwent Siobhan Hughs patch. She states since then she has tolerated the Eliquis well and has had no blood in her stools, urine or epistaxis. She notes that she is compliant with her Eliquis. No Known Allergies    Prior to Admission medications    Medication Sig Start Date End Date Taking? Authorizing Provider   Eliquis 5 mg tablet Take 5 mg by mouth two (2) times a day. 22  Yes Provider, Historical   pantoprazole (PROTONIX) 40 mg tablet Take 40 mg by mouth daily. Yes Provider, Historical   multivitamin (ONE A DAY) tablet Take 1 Tablet by mouth daily.    Yes Provider, Historical       Past Medical History:   Diagnosis Date    Anxiety     COVID-19 vaccine administered     boostered   Pfizer    History of hip replacement     22    Perforated [facility] ulcer ([facility]     22    Pulmonary embolism ([facility]     2/10/22    White coat syndrome without diagnosis of hypertension Past Surgical History:   Procedure Laterality Date    HX COLONOSCOPY  09/2021    HX DILATION AND CURETTAGE  1990    HX ORTHOPAEDIC  01/31/2022    right hip replaced        Social History     Tobacco Use    Smoking status: Never Smoker    Smokeless tobacco: Never Used   Substance Use Topics    Alcohol use: Not Currently     Comment: social        Family History   Problem Relation Age of Onset    Heart Disease Mother     Heart Disease Father     Clotting Disorder Neg Hx         Review of Systems:  Per HPI         Objective:      VITALS:    Vital signs reviewed; most recent are:    Visit Vitals  /75   Pulse (!) 101   Temp 98.9 °F (37.2 °C)   Resp 20   Ht 5' 5\" (1.651 m)   Wt 84.8 kg (187 lb)   SpO2 97%   BMI 31.12 kg/m²     SpO2 Readings from Last 6 Encounters:   05/24/22 97%   05/02/22 99%   04/19/22 99%   02/22/22 98%   02/22/22 98%   02/17/22 98%            Intake/Output Summary (Last 24 hours) at 5/24/2022 1710  Last data filed at 5/24/2022 1315  Gross per 24 hour   Intake 12.08 ml   Output --   Net 12.08 ml        Exam:     Physical Exam:    Gen:  Well-developed, well-nourished, in no acute distress  HEENT:  Pink conjunctivae, PERRL, hearing intact to voice, moist mucous membranes  Neck:  Supple, no tenderness to palpation   Resp:  No accessory muscle use, clear breath sounds without wheezes rales or rhonchi  Card: Irregularly irregular, No murmurs, normal S1, S2, no peripheral edema  Abd:  Soft, non-tender, non-distended, normoactive bowel sounds are present  Musc:  No cyanosis or clubbing  Skin:  No rashes or ulcers, skin turgor is good  Neuro:  Cranial nerves 3-12 are grossly intact, strength 5/5 bilaterally UEs and LEs, follows commands appropriately  Psych:  Oriented to person, place, and time, Alert with good insight      Labs:    Recent Labs     05/24/22  1046   WBC 9.8   HGB 12.3   HCT 38.2        Recent Labs     05/24/22  1046      K 3.9   *   CO2 25   *   BUN 24*   CREA 0.80   CA 9.2   MG 2.1   ALB 3.5   TBILI 0.3   ALT 25     Lab Results   Component Value Date/Time    Glucose (POC) 113 02/13/2022 12:31 PM     No results for input(s): PH, PCO2, PO2, HCO3, FIO2 in the last 72 hours. No results for input(s): INR, INREXT, INREXT in the last 72 hours. Radiology and EKG reviewed: Chest x-ray with no acute concerns     Old Records reviewed in [facility]       Assessment/Plan:         Atrial fibrillation with rapid ventricular response ([facility] (5/24/2022): History of A. fib prior in setting of acute illness with pneumonia and surgery. Now recurrent. [id number] 2. Trend troponins. TSH within normal limits. Doubt PE due to home Eliquis with reported compliance; however will check D-dimer. Check echo. Status post 20 mg IV Cardizem and 5 mg IV Lopressor. Continue Cardizem drip. Continue home Eliquis. Consult cardiology. Monitor. Pulmonary embolism without acute cor pulmonale ([facility] (2/15/2022): Due to postop complication after hip replacement. Continue Eliquis as above. History of gastric ulcer status post Amol Mikes patch: In February 2022. Reports no issues with bleeding while on Eliquis. Continue PPI. Elevated lipids:  Follow-up outpatient       Risk of deterioration: high      Total time spent with patient: 36 Minutes **I personally saw and examined the patient during this time period**                 Care Plan discussed with: Patient, Family and Nursing Staff    Discussed:  Care Plan    Prophylaxis: Eliquis    Probable Disposition:  Home w/Family           ___________________________________________________    Attending Physician: Natividad Hartmann DO

## 2022-05-24 NOTE — PROGRESS NOTES
Cardiology Initial Care Encounter    Patient: Domenica Herrera MRN: [id number]     YOB: 1955  Age: 75 y.o. Sex: female      Admit Date: 5/24/2022       Assessment/Plan     1. A-fib w/ RVR: recent hx of during hospitalization in Feb in setting of PNA and perforated gastric ulcer. Cont dilt gtt (converted w/ dilt previously), on Eliquis. Give 5 mg metoprolol IV. [id number] score 2. TSH 0.56. Recent event monitor mailed to her, which she is wearing     2. PE: dx 2/2022 on Eliquis     3. HLD, mild atherosclerosis on CT: per notes, discussed starting statin at next office visit          Pt personally seen and examined. Chart reviewed. Agree with advanced NP's history, exam and  A/P with changes/additons. Blood pressure 138/64, pulse (!) 105, temperature 98.2 °F (36.8 °C), resp. rate 12, height 5' 5\" (1.651 m), weight 187 lb (84.8 kg), SpO2 97 %. Neck-no JVD  CVS-S1-S2 present, Irr  2/6 systolic murmur present  RS-   CTAB     Abdomen-  Obese/soft/NT  LE-   no  edema    A/P :  Afib with RVR - asymptomatic. On Eliquis -will consider DCCV if she does not convert on Dilt gtt    Discussed with patient/nursing/family    Shashank Vigil. Thomas SANDOVAL Jose L is a 75 y.o.  female with PMH significant for a-fib, HLD, PE, perforated gastric ulcer, thyroid nodule/mass, anemia, and hip replacement. Pt was seen by her PCP earlier today for routine physical where she was noted to have elevated HR and told to go to the ED. Pt always feels anxious at the doctors office and can feel her heart racing, then after a few minutes she will calm down. Her symptoms were consistent with what she normally feels. Denies any dizziness, CP, current palpitations, SOB or edema. ECHO 02/04/22    ECHO ADULT COMPLETE 02/14/2022 2/14/2022    Interpretation Summary    Left Ventricle: Left ventricle size is normal. Normal wall thickness. Unable to assess wall motion.  Hyperdynamic left ventricular systolic function with a visually estimated EF of 70 - 75%. Normal diastolic function. Technical qualifiers: Echo study was technically difficult and limited due to patient's condition. Signed by: Kathe Quan DO on 2/14/2022  1:15 PM    The patient has been referred to cardiology for on going management of a-fib w/ RVR. Review of Symptoms:  Constitutional: negative  ENT: negative   Respiratory: negative  Gastrointestinal: negative  Genitourinary: no dysuria, hematuria, frequency   Musculoskeletal:negative  Neurological: negative  Other systems reviewed and negative except as above. Previous cardiac hx  No specialty comments available. Risk factors: HLD, obesity     Social History     Tobacco Use    Smoking status: Never Smoker    Smokeless tobacco: Never Used   Substance Use Topics    Alcohol use: Not Currently     Comment: social     Family History   Problem Relation Age of Onset    Clotting Disorder Neg Hx        Current Facility-Administered Medications   Medication Dose Route Frequency    dilTIAZem (CARDIZEM) 100 mg in 0.9% sodium chloride (MBP/ADV) 100 mL infusion  0-15 mg/hr IntraVENous TITRATE    sodium chloride (NS) flush 5-40 mL  5-40 mL IntraVENous Q8H    sodium chloride (NS) flush 5-40 mL  5-40 mL IntraVENous PRN    acetaminophen (TYLENOL) tablet 650 mg  650 mg Oral Q6H PRN    Or    acetaminophen (TYLENOL) suppository 650 mg  650 mg Rectal Q6H PRN    polyethylene glycol (MIRALAX) packet 17 g  17 g Oral DAILY PRN    ondansetron (ZOFRAN ODT) tablet 4 mg  4 mg Oral Q8H PRN    Or    ondansetron (ZOFRAN) injection 4 mg  4 mg IntraVENous Q6H PRN     Current Outpatient Medications   Medication Sig    Eliquis 5 mg tablet Take 5 mg by mouth two (2) times a day. pantoprazole (PROTONIX) 40 mg tablet Take 40 mg by mouth daily. multivitamin (ONE A DAY) tablet Take 1 Tablet by mouth daily.        Objective:     Vitals:    05/24/22 1112 05/24/22 1118 05/24/22 1130 05/24/22 1200 BP: (!) 140/92 (!) 114/56 122/73 110/77   Pulse: (!) 147 (!) 108 (!) 101 (!) 128   Resp: 17 21 11 11   Temp:       SpO2: 99% 97% 98% 97%   Weight:       Height:            Intake and Output:  Current Shift: No intake/output data recorded. Last three shifts: No intake/output data recorded. Gen: Well-developed, well-nourished, in no acute distress  Neck: Supple,No JVD, No Carotid Bruit,   Resp: No accessory muscle use, Clear breath sounds, No rales or rhonchi  Card: Irregular Rate,Rythm, Normal S1, S2, No murmurs, rubs or gallop. No thrills. Abd:  Soft, non-tender, non-distended, BS+  MSK: No cyanosis  Skin: No rashes    Neuro: moving all four extremities , follows commands appropriately  Psych:  Good insight, oriented to person, place , alert, Nml Affect  LE: No edema    EKG: atrial fibrillation, rate 140's. TELEMETRY A-fib rate 100-120's    Lab/Data Review: All lab results for the last 24 hours reviewed.      Signed By: Jose F Ruiz NP     May 24, 2022

## 2022-05-24 NOTE — ED PROVIDER NOTES
76-year-old female with a history of prior pulmonary embolism, anxiety, and prior paroxysmal atrial fibrillation, who follows with Dr. Jai Moscoso presents to the emergency department by EMS from her primary care provider's office where she was found to have atrial fibrillation with RVR. The patient is asymptomatic from this however and denies any sensation of palpitations, chest pain, shortness of breath, nausea, vomiting, lightheadedness, syncope, or any other medical concerns. She states that this is the case with her paroxysmal atrial fibrillation episodes previously as well and she is currently wearing a cardiac monitor that was placed in her outpatient cardiology office. She is currently taking Eliquis given history of prior pulmonary embolism.            Past Medical History:   Diagnosis Date    Anxiety     COVID-19 vaccine administered     boostered   Pfizer    History of hip replacement     1/31/22    Perforated gastric ulcer ([facility]     2/4/22    Pulmonary embolism ([facility] [address] [facility]     2/10/22    White coat syndrome without diagnosis of hypertension        Past Surgical History:   Procedure Laterality Date    HX COLONOSCOPY  09/2021    HX DILATION AND CURETTAGE  1990    HX ORTHOPAEDIC  01/31/2022    right hip replaced          Family History:   Problem Relation Age of Onset    Heart Disease Mother     Heart Disease Father     Clotting Disorder Neg Hx        Social History     Socioeconomic History    Marital status:      Spouse name: Not on file    Number of children: Not on file    Years of education: Not on file    Highest education level: Not on file   Occupational History    Not on file   Tobacco Use    Smoking status: Never Smoker    Smokeless tobacco: Never Used   Vaping Use    Vaping Use: Never used   Substance and Sexual Activity    Alcohol use: Not Currently     Comment: social    Drug use: Never    Sexual activity: Not on file   Other Topics Concern   [address] Service Not Asked    Blood Transfusions Not Asked    Caffeine Concern Not Asked    Occupational Exposure Not Asked    Hobby Hazards Not Asked    Sleep Concern Not Asked    Stress Concern Not Asked    Weight Concern Not Asked    Special Diet Not Asked    Back Care Not Asked    Exercise Not Asked    Bike Helmet Not Asked   [address] Not Asked    Self-Exams Not Asked   Social History Narrative    Not on file     Social Determinants of Health     Financial Resource Strain:     Difficulty of Paying Living Expenses: Not on file   Food Insecurity:     Worried About Running Out of Food in the Last Year: Not on file    Chuck of Food in the Last Year: Not on file   Transportation Needs:     Lack of Transportation (Medical): Not on file    Lack of Transportation (Non-Medical): Not on file   Physical Activity:     Days of Exercise per Week: Not on file    Minutes of Exercise per Session: Not on file   Stress:     Feeling of Stress : Not on file   Social Connections:     Frequency of Communication with Friends and Family: Not on file    Frequency of Social Gatherings with Friends and Family: Not on file    Attends Muslim Services: Not on file    Active Member of [address] [place] [address] or Organizations: Not on file    Attends Club or Organization Meetings: Not on file    Marital Status: Not on file   Intimate Partner Violence:     Fear of Current or Ex-Partner: Not on file    Emotionally Abused: Not on file    Physically Abused: Not on file    Sexually Abused: Not on file   Housing Stability:     Unable to Pay for Housing in the Last Year: Not on file    Number of [place] in the Last Year: Not on file    Unstable Housing in the Last Year: Not on file         ALLERGIES: Patient has no known allergies. Review of Systems   Constitutional: Negative for activity change, appetite change, chills and fever. HENT: Negative for congestion, rhinorrhea, sinus pressure, sneezing and sore throat.     Eyes: Negative for photophobia and visual disturbance. Respiratory: Negative for cough and shortness of breath. Cardiovascular: Negative for chest pain and palpitations. Gastrointestinal: Negative for abdominal pain, blood in stool, constipation, diarrhea, nausea and vomiting. Genitourinary: Negative for difficulty urinating, dysuria, flank pain, frequency, hematuria, menstrual problem, urgency, vaginal bleeding and vaginal discharge. Musculoskeletal: Negative for arthralgias, back pain, myalgias and neck pain. Skin: Negative for rash and wound. Neurological: Negative for syncope, weakness, numbness and headaches. Psychiatric/Behavioral: Negative for self-injury and suicidal ideas. All other systems reviewed and are negative. Vitals:    05/24/22 1200 05/24/22 1318 05/24/22 1354 05/24/22 1659   BP: 110/77 (!) 136/58 124/78 122/75   Pulse: (!) 128 (!) 121 (!) 113 (!) 101   Resp: 11 19  20   Temp:  98.4 °F (36.9 °C)  98.9 °F (37.2 °C)   SpO2: 97% 97%  97%   Weight:       Height:                Physical Exam  Vitals and nursing note reviewed. Constitutional:       General: She is not in acute distress. Appearance: Normal appearance. She is well-developed. She is not diaphoretic. Comments: Very pleasant, no acute distress. HENT:      Head: Normocephalic and atraumatic. Nose: Nose normal.   Eyes:      Extraocular Movements: Extraocular movements intact. Conjunctiva/sclera: Conjunctivae normal.      Pupils: Pupils are equal, round, and reactive to light. Cardiovascular:      Rate and Rhythm: Tachycardia present. Rhythm irregular. Heart sounds: Normal heart sounds. Pulmonary:      Effort: Pulmonary effort is normal.      Breath sounds: Normal breath sounds. Abdominal:      General: There is no distension. Palpations: Abdomen is soft. Tenderness: There is no abdominal tenderness. Musculoskeletal:         General: No tenderness. Cervical back: Neck supple.    Skin:     General: Skin is warm and dry. Neurological:      General: No focal deficit present. Mental Status: She is alert and oriented to person, place, and time. Cranial Nerves: No cranial nerve deficit. Sensory: No sensory deficit. Motor: No weakness. Coordination: Coordination normal.          MDM   67-year-old female presents with atrial fibrillation with RVR although she is asymptomatic from it. Heart rate ranging in the 150s on arrival stable blood pressure. Labs returned reassuringly showing no significant abnormalities. Negative troponin. CXR viewed by myself and read by radiology showing no acute abnormalities. She was given bolus dose of IV diltiazem and was started on gtt. with improvement in her heart rate but she remained in atrial fibrillation. Cardiology was consulted. Will admit to the hospitalist service for further care and assessment. Total critical care time spent exclusive of procedures:  60 minutes      Procedures    1042 EKG shows atrial fibrillation with RVR with a rate of 142 bpm with voltage criteria for LVH but no acute ST or T wave abnormalities suggestive of ischemia. Perfect Serve Consult for Admission  11:48 AM    ED Room Number: ER15/15  Patient Name and age:  Will Stubbs 75 y.o.  female  Working Diagnosis:   1. Atrial fibrillation with RVR ([address]        COVID-19 Suspicion:  no  Sepsis present:  no  Reassessment needed: no  Code Status:  Full Code  Readmission: no  Isolation Requirements:  no  Recommended Level of Care:  telemetry  Department:[facility] ED - [phone]  Other: 67-year-old female with a history of prior paroxysmal atrial fibrillation presents to the emergency department referred by her PCP for persistent atrial fibrillation with RVR with rates in the 150s. Stable blood pressure and GCS 15. Denies any sensation of the palpitations, chest pain, shortness of breath. Labs reassuring, negative troponin.   Cardiology consulted,  Britatni Vargas.  Given bolus dose of diltiazem and started on gtt. with some improvement in her rate but remains in atrial fibrillation with RVR.

## 2022-05-25 ENCOUNTER — APPOINTMENT (OUTPATIENT)
Dept: NON INVASIVE DIAGNOSTICS | Age: 67
End: 2022-05-25
Attending: NURSE PRACTITIONER
Payer: COMMERCIAL

## 2022-05-25 ENCOUNTER — APPOINTMENT (OUTPATIENT)
Dept: NON INVASIVE DIAGNOSTICS | Age: 67
End: 2022-05-25
Attending: INTERNAL MEDICINE
Payer: COMMERCIAL

## 2022-05-25 VITALS
RESPIRATION RATE: 20 BRPM | HEART RATE: 80 BPM | WEIGHT: 187 LBS | DIASTOLIC BLOOD PRESSURE: 67 MMHG | SYSTOLIC BLOOD PRESSURE: 108 MMHG | HEIGHT: 65 IN | OXYGEN SATURATION: 96 % | TEMPERATURE: 98.2 F | BODY MASS INDEX: 31.16 KG/M2

## 2022-05-25 LAB
ALBUMIN SERPL-MCNC: 3.1 G/DL (ref 3.5–5)
ALBUMIN/GLOB SERPL: 0.8 {RATIO} (ref 1.1–2.2)
ALP SERPL-CCNC: 150 U/L (ref 45–117)
ALT SERPL-CCNC: 23 U/L (ref 12–78)
ANION GAP SERPL CALC-SCNC: 8 MMOL/L (ref 5–15)
AST SERPL-CCNC: 16 U/L (ref 15–37)
BASOPHILS # BLD: 0 K/UL (ref 0–0.1)
BASOPHILS NFR BLD: 0 % (ref 0–1)
BILIRUB SERPL-MCNC: 0.4 MG/DL (ref 0.2–1)
BUN SERPL-MCNC: 19 MG/DL (ref 6–20)
BUN/CREAT SERPL: 26 (ref 12–20)
CALCIUM SERPL-MCNC: 9 MG/DL (ref 8.5–10.1)
CHLORIDE SERPL-SCNC: 108 MMOL/L (ref 97–108)
CO2 SERPL-SCNC: 23 MMOL/L (ref 21–32)
CREAT SERPL-MCNC: 0.74 MG/DL (ref 0.55–1.02)
DIFFERENTIAL METHOD BLD: ABNORMAL
ECHO AO ASC DIAM: 2.6 CM
ECHO AO ASCENDING AORTA INDEX: 1.35 CM/M2
ECHO AV AREA PEAK VELOCITY: 2 CM2
ECHO AV AREA VTI: 2.4 CM2
ECHO AV AREA/BSA PEAK VELOCITY: 1 CM2/M2
ECHO AV AREA/BSA VTI: 1.3 CM2/M2
ECHO AV MEAN GRADIENT: 3 MMHG
ECHO AV MEAN VELOCITY: 0.8 M/S
ECHO AV PEAK GRADIENT: 5 MMHG
ECHO AV PEAK VELOCITY: 1.1 M/S
ECHO AV VELOCITY RATIO: 0.64
ECHO AV VTI: 19.4 CM
ECHO LA DIAMETER INDEX: 1.61 CM/M2
ECHO LA DIAMETER: 3.1 CM
ECHO LA VOL 4C: 32 ML (ref 22–52)
ECHO LA VOLUME INDEX A4C: 17 ML/M2 (ref 16–34)
ECHO LV E' LATERAL VELOCITY: 10 CM/S
ECHO LV E' SEPTAL VELOCITY: 9 CM/S
ECHO LV FRACTIONAL SHORTENING: 33 % (ref 28–44)
ECHO LV INTERNAL DIMENSION DIASTOLE INDEX: 2.24 CM/M2
ECHO LV INTERNAL DIMENSION DIASTOLIC: 4.3 CM (ref 3.9–5.3)
ECHO LV INTERNAL DIMENSION SYSTOLIC INDEX: 1.51 CM/M2
ECHO LV INTERNAL DIMENSION SYSTOLIC: 2.9 CM
ECHO LV IVSD: 1 CM (ref 0.6–0.9)
ECHO LV MASS 2D: 152.6 G (ref 67–162)
ECHO LV MASS INDEX 2D: 79.5 G/M2 (ref 43–95)
ECHO LV POSTERIOR WALL DIASTOLIC: 1.1 CM (ref 0.6–0.9)
ECHO LV RELATIVE WALL THICKNESS RATIO: 0.51
ECHO LVOT AREA: 3.1 CM2
ECHO LVOT AV VTI INDEX: 0.77
ECHO LVOT DIAM: 2 CM
ECHO LVOT MEAN GRADIENT: 1 MMHG
ECHO LVOT PEAK GRADIENT: 2 MMHG
ECHO LVOT PEAK VELOCITY: 0.7 M/S
ECHO LVOT STROKE VOLUME INDEX: 24.5 ML/M2
ECHO LVOT SV: 47.1 ML
ECHO LVOT VTI: 15 CM
ECHO MV A VELOCITY: 0.54 M/S
ECHO MV E DECELERATION TIME (DT): 288.8 MS
ECHO MV E VELOCITY: 0.65 M/S
ECHO MV E/A RATIO: 1.2
ECHO MV E/E' LATERAL: 6.5
ECHO MV E/E' RATIO (AVERAGED): 6.86
ECHO MV E/E' SEPTAL: 7.22
ECHO PV MAX VELOCITY: 1 M/S
ECHO PV PEAK GRADIENT: 4 MMHG
ECHO RV INTERNAL DIMENSION: 3.2 CM
ECHO RV TAPSE: 1.4 CM (ref 1.7–?)
ECHO RVOT PEAK GRADIENT: 3 MMHG
ECHO RVOT PEAK VELOCITY: 0.8 M/S
ECHO TV REGURGITANT MAX VELOCITY: 2.36 M/S
ECHO TV REGURGITANT PEAK GRADIENT: 22 MMHG
EOSINOPHIL # BLD: 0 K/UL (ref 0–0.4)
EOSINOPHIL NFR BLD: 0 % (ref 0–7)
ERYTHROCYTE [DISTWIDTH] IN BLOOD BY AUTOMATED COUNT: 14 % (ref 11.5–14.5)
GLOBULIN SER CALC-MCNC: 4.1 G/DL (ref 2–4)
GLUCOSE SERPL-MCNC: 100 MG/DL (ref 65–100)
HCT VFR BLD AUTO: 34.3 % (ref 35–47)
HGB BLD-MCNC: 11.4 G/DL (ref 11.5–16)
IMM GRANULOCYTES # BLD AUTO: 0 K/UL (ref 0–0.04)
IMM GRANULOCYTES NFR BLD AUTO: 0 % (ref 0–0.5)
LYMPHOCYTES # BLD: 3.4 K/UL (ref 0.8–3.5)
LYMPHOCYTES NFR BLD: 36 % (ref 12–49)
MAGNESIUM SERPL-MCNC: 2.1 MG/DL (ref 1.6–2.4)
MCH RBC QN AUTO: 28.6 PG (ref 26–34)
MCHC RBC AUTO-ENTMCNC: 33.2 G/DL (ref 30–36.5)
MCV RBC AUTO: 86 FL (ref 80–99)
MONOCYTES # BLD: 0.5 K/UL (ref 0–1)
MONOCYTES NFR BLD: 6 % (ref 5–13)
NEUTS SEG # BLD: 5.4 K/UL (ref 1.8–8)
NEUTS SEG NFR BLD: 58 % (ref 32–75)
NRBC # BLD: 0 K/UL (ref 0–0.01)
NRBC BLD-RTO: 0 PER 100 WBC
PLATELET # BLD AUTO: 353 K/UL (ref 150–400)
PMV BLD AUTO: 9.7 FL (ref 8.9–12.9)
POTASSIUM SERPL-SCNC: 3.6 MMOL/L (ref 3.5–5.1)
PROT SERPL-MCNC: 7.2 G/DL (ref 6.4–8.2)
RBC # BLD AUTO: 3.99 M/UL (ref 3.8–5.2)
SODIUM SERPL-SCNC: 139 MMOL/L (ref 136–145)
TROPONIN-HIGH SENSITIVITY: 18 NG/L (ref 0–51)
WBC # BLD AUTO: 9.5 K/UL (ref 3.6–11)

## 2022-05-25 PROCEDURE — 83735 ASSAY OF MAGNESIUM: CPT

## 2022-05-25 PROCEDURE — 93005 ELECTROCARDIOGRAM TRACING: CPT

## 2022-05-25 PROCEDURE — 92960 CARDIOVERSION ELECTRIC EXT: CPT | Performed by: INTERNAL MEDICINE

## 2022-05-25 PROCEDURE — G0378 HOSPITAL OBSERVATION PER HR: HCPCS

## 2022-05-25 PROCEDURE — 93306 TTE W/DOPPLER COMPLETE: CPT | Performed by: INTERNAL MEDICINE

## 2022-05-25 PROCEDURE — 96376 TX/PRO/DX INJ SAME DRUG ADON: CPT

## 2022-05-25 PROCEDURE — 74011250636 HC RX REV CODE- 250/636: Performed by: INTERNAL MEDICINE

## 2022-05-25 PROCEDURE — 74011250637 HC RX REV CODE- 250/637: Performed by: NURSE PRACTITIONER

## 2022-05-25 PROCEDURE — 99152 MOD SED SAME PHYS/QHP 5/>YRS: CPT

## 2022-05-25 PROCEDURE — 92960 CARDIOVERSION ELECTRIC EXT: CPT

## 2022-05-25 PROCEDURE — 77030018729 HC ELECTRD DEFIB PAD CARD -B

## 2022-05-25 PROCEDURE — 84484 ASSAY OF TROPONIN QUANT: CPT

## 2022-05-25 PROCEDURE — 85025 COMPLETE CBC W/AUTO DIFF WBC: CPT

## 2022-05-25 PROCEDURE — 74011000258 HC RX REV CODE- 258: Performed by: EMERGENCY MEDICINE

## 2022-05-25 PROCEDURE — 93306 TTE W/DOPPLER COMPLETE: CPT

## 2022-05-25 PROCEDURE — APPSS30 APP SPLIT SHARED TIME 16-30 MINUTES: Performed by: NURSE PRACTITIONER

## 2022-05-25 PROCEDURE — 74011250637 HC RX REV CODE- 250/637: Performed by: INTERNAL MEDICINE

## 2022-05-25 PROCEDURE — 74011250636 HC RX REV CODE- 250/636: Performed by: NURSE PRACTITIONER

## 2022-05-25 PROCEDURE — 74011000258 HC RX REV CODE- 258: Performed by: INTERNAL MEDICINE

## 2022-05-25 PROCEDURE — 74011250636 HC RX REV CODE- 250/636: Performed by: EMERGENCY MEDICINE

## 2022-05-25 PROCEDURE — 80053 COMPREHEN METABOLIC PANEL: CPT

## 2022-05-25 PROCEDURE — 99232 SBSQ HOSP IP/OBS MODERATE 35: CPT | Performed by: INTERNAL MEDICINE

## 2022-05-25 PROCEDURE — 96366 THER/PROPH/DIAG IV INF ADDON: CPT

## 2022-05-25 PROCEDURE — 96365 THER/PROPH/DIAG IV INF INIT: CPT

## 2022-05-25 PROCEDURE — 36415 COLL VENOUS BLD VENIPUNCTURE: CPT

## 2022-05-25 PROCEDURE — 74011000250 HC RX REV CODE- 250: Performed by: INTERNAL MEDICINE

## 2022-05-25 PROCEDURE — 96368 THER/DIAG CONCURRENT INF: CPT

## 2022-05-25 RX ORDER — FENTANYL CITRATE 50 UG/ML
25-200 INJECTION, SOLUTION INTRAMUSCULAR; INTRAVENOUS
Status: DISCONTINUED | OUTPATIENT
Start: 2022-05-25 | End: 2022-05-25 | Stop reason: HOSPADM

## 2022-05-25 RX ORDER — POTASSIUM CHLORIDE 7.45 MG/ML
10 INJECTION INTRAVENOUS
Status: COMPLETED | OUTPATIENT
Start: 2022-05-25 | End: 2022-05-25

## 2022-05-25 RX ORDER — DILTIAZEM HYDROCHLORIDE 30 MG/1
30 TABLET, FILM COATED ORAL
Qty: 90 TABLET | Refills: 0 | Status: SHIPPED | OUTPATIENT
Start: 2022-05-25 | End: 2022-06-08

## 2022-05-25 RX ORDER — MIDAZOLAM HYDROCHLORIDE 1 MG/ML
.5-1 INJECTION, SOLUTION INTRAMUSCULAR; INTRAVENOUS
Status: DISCONTINUED | OUTPATIENT
Start: 2022-05-25 | End: 2022-05-25 | Stop reason: HOSPADM

## 2022-05-25 RX ORDER — DILTIAZEM HYDROCHLORIDE 30 MG/1
30 TABLET, FILM COATED ORAL
Status: DISCONTINUED | OUTPATIENT
Start: 2022-05-25 | End: 2022-05-25 | Stop reason: HOSPADM

## 2022-05-25 RX ADMIN — MIDAZOLAM HYDROCHLORIDE 2 MG: 1 INJECTION, SOLUTION INTRAMUSCULAR; INTRAVENOUS at 12:15

## 2022-05-25 RX ADMIN — FENTANYL CITRATE 50 MCG: 50 INJECTION INTRAMUSCULAR; INTRAVENOUS at 12:21

## 2022-05-25 RX ADMIN — POTASSIUM CHLORIDE 10 MEQ: 7.46 INJECTION, SOLUTION INTRAVENOUS at 10:19

## 2022-05-25 RX ADMIN — SODIUM CHLORIDE 5 MG/HR: 900 INJECTION, SOLUTION INTRAVENOUS at 00:13

## 2022-05-25 RX ADMIN — AMIODARONE HYDROCHLORIDE 1 MG/MIN: 50 INJECTION, SOLUTION INTRAVENOUS at 10:47

## 2022-05-25 RX ADMIN — Medication 10 ML: at 05:53

## 2022-05-25 RX ADMIN — SODIUM CHLORIDE 10 MG/HR: 900 INJECTION, SOLUTION INTRAVENOUS at 11:38

## 2022-05-25 RX ADMIN — MIDAZOLAM HYDROCHLORIDE 0.5 MG: 1 INJECTION, SOLUTION INTRAMUSCULAR; INTRAVENOUS at 12:22

## 2022-05-25 RX ADMIN — APIXABAN 5 MG: 5 TABLET, FILM COATED ORAL at 09:12

## 2022-05-25 RX ADMIN — DILTIAZEM HYDROCHLORIDE 30 MG: 30 TABLET, FILM COATED ORAL at 12:50

## 2022-05-25 RX ADMIN — FENTANYL CITRATE 50 MCG: 50 INJECTION INTRAMUSCULAR; INTRAVENOUS at 12:15

## 2022-05-25 RX ADMIN — POTASSIUM CHLORIDE 10 MEQ: 7.46 INJECTION, SOLUTION INTRAVENOUS at 09:13

## 2022-05-25 RX ADMIN — MIDAZOLAM HYDROCHLORIDE 1 MG: 1 INJECTION, SOLUTION INTRAMUSCULAR; INTRAVENOUS at 12:19

## 2022-05-25 NOTE — PROGRESS NOTES
Physician Progress Note      PATIENT:               Papito Das  [facility] #:                  [id number]  :                       1955  ADMIT DATE:       2022 10:33 AM  DISCH DATE:        2022 3:25 PM  RESPONDING  PROVIDER #:        Ghazal Brown DO BONNIE DO          QUERY TEXT:    Good Afternoon    This patient admitted for A-fib with RVR. There is currently conflicting documentation regarding the Acuity of the PE and if possible needs to be clarified. The H&P Notes PE without acute cor pulmonale\", (02/15/2022)  The H&P and Discharge summary also notes \"Doubt PE due to home Eliquis with reported compliance\"    If possible, can you please document in progress notes and discharge summary the acuity of the PE for this admission and the clinical indicators to support this diagnosis for the PE for this admission. Or please clarify if this Pulmonary embolism is a PMH or recent history of only. The medical record reflects the following:  Risk Factors: Known A-fib, 2022, had Hip replacement and history notes had PE after this procedure 2022  Clinical Indicators: Presented with A-fib with RVR. H&P notes, \"Pulmonary embolism without acute cor pulmonale 02/15/2022 due to post op complication after a hip replacement, Echo this admission EF 55-60% D-dimer . 39  Treatment:  Pt has continued to receive Eliquis, Echo ,Xray only this admission    Thank you  Breanna Castellonywood, [address] [facility], [address], [address] [place] [address]  Options provided:  -- Pulmonary embolism without acute cor pumonale  currently being treated/evaluated as evidenced by, Please document supportive evidence.   -- Pulmonary embolism without acute cor pulmonale is PMH only  -- Other - I will add my own diagnosis  -- Disagree - Not applicable / Not valid  -- Disagree - Clinically unable to determine / Unknown  -- Refer to Clinical Documentation Reviewer    PROVIDER RESPONSE TEXT:    Pulmonary embolism without acute cor pulmonale is PMH only.    Query created by: Susi Thompson on 5/25/2022 2:20 PM      Electronically signed by:  Gio Caban DO 5/25/2022 4:57 PM

## 2022-05-25 NOTE — PROGRESS NOTES
TRANSFER - IN REPORT:    Verbal bedside report received from ED RN(name) on Nani Malagon  being received from ED(unit) for ordered procedure      Report consisted of patients Situation, Background, Assessment and   Recommendations(SBAR). Information from the following report(s) ED Summary was reviewed with the receiving nurse. Opportunity for questions and clarification was provided. Assessment completed upon patients arrival to unit and care assumed.

## 2022-05-25 NOTE — PROGRESS NOTES
Home cardiac monitor removed for DCCV. Pt instructed to reapply after discharge to continue remaining period of monitoring.

## 2022-05-25 NOTE — DISCHARGE SUMMARY
[facility] [place] [address]  [address] [facility], [address] [place] [address]  [phone]    Physician Discharge Summary     Patient ID:  Brennan Pack  [id number]  75 y.o.  1955    Admit date: 5/24/2022    Discharge date and time: 5/25/2022 1:09 PM    Admission Diagnoses: Atrial fibrillation with rapid ventricular response [facility]) [I48.91]    Discharge Diagnoses:  Principal Diagnosis <principal problem not specified>                                            Active Problems:    Atrial fibrillation with rapid ventricular response ([address] (5/24/2022)      Gastric ulcer (2/4/2022)      Other pulmonary embolism without acute cor pulmonale ([address] (2/15/2022)      Anxiety ()         Hospital Course:     Atrial fibrillation with rapid ventricular response ([address] (5/24/2022): History of A. fib prior in setting of acute illness with pneumonia and surgery. Now recurrent. [id number] 2. Now in sinus s/p cardioversion 5/25. Troponins neg x3. TSH within normal limits. Doubt PE as d-dimer wnl and on home eliquis. Status  IV Cardizem and 5 mg IV Lopressor. Briefly on amio post cardioversion. Started on PO Cardizem on DC. Continue home Eliquis. Cardiology evaluated; f/u OP.        Pulmonary embolism without acute cor pulmonale ([address] (2/15/2022): Due to postop complication after hip replacement. Continue Eliquis as above.     History of gastric ulcer status post Marda Milton patch: In February 2022. Reports no issues with bleeding while on Eliquis. Continue PPI. F/u OP.      Elevated lipids:  Follow-up outpatient      PCP: Tory Narayanan MD     Consults: Cardiology    Significant Diagnostic Studies:   CXR unremarkable     Discharge Exam:  Physical Exam:    Gen:  Well-developed, well-nourished, in no acute distress  HEENT:  Pink conjunctivae, PERRL, hearing intact to voice, moist mucous membranes  Neck:  Supple, no tenderness to palpation   Resp:  No accessory muscle use, clear breath sounds without wheezes rales or rhonchi  Card:  now regular, No murmurs, normal S1, S2, no peripheral edema  Abd:  Soft, non-tender, non-distended, normoactive bowel sounds are present  Musc:  No cyanosis or clubbing  Skin:  No rashes or ulcers, skin turgor is good  Neuro:  Cranial nerves 3-12 are grossly intact, strength 5/5 bilaterally UEs and LEs, follows commands appropriately  Psych:  Oriented to person, place, and time, Alert with good insight    Disposition: home  Discharge Condition: Stable    Patient Instructions:   Current Discharge Medication List      START taking these medications    Details   dilTIAZem IR (CARDIZEM) 30 mg tablet Take 1 Tablet by mouth Before breakfast, lunch, and dinner. Qty: 90 Tablet, Refills: 0         CONTINUE these medications which have NOT CHANGED    Details   Eliquis 5 mg tablet Take 5 mg by mouth two (2) times a day. pantoprazole (PROTONIX) 40 mg tablet Take 40 mg by mouth daily. multivitamin (ONE A DAY) tablet Take 1 Tablet by mouth daily. Activity: Activity as tolerated  Diet: Cardiac Diet  Wound Care: None needed    Follow-up with  Follow-up Information     Follow up With Specialties Details Why Contact Info    Davi Hudson MD Cardiovascular Disease Physician On 6/8/2022 1:40 pm  [address] [address] [facility]  [phone]      Dashawn Dalton MD Family Medicine Schedule an appointment as soon as possible for a visit in 1 week Please follow up with your primary care doctor within 1 week  Mark  [phone]            Follow-up tests/labs as above.      Signed:  Fran Rae DO  5/25/2022  1:09 PM  **I personally spent 35 min on discharge**

## 2022-05-25 NOTE — PROGRESS NOTES
CARDIOLOGY PROGRESS NOTE        [address] [facility], [place], [address] [facility]  Phone [phone]; Fax [phone]          2022 9:16 AM       Admit Date:           2022  Admit Diagnosis:  Atrial fibrillation with rapid ventricular response ([address] [I48.91]  :          1955   MRN:          [id number]        Assessment/Plan  1 a fib RVR: initially noted in 2022 for hospitalization for PNA. Despite IV dilt remains in a fib 130's. Will plan for cardioversion today. Explained procedure to pt and spouse. They are in agreement to proceed. 2 Hx PE on eleiquis since 2022. One missed dose last week. 3 calcification on thyroid: TSH wnl. Pt personally seen and examined. Chart reviewed. Agree with advanced NP's history, exam and  A/P with changes/additons. Blood pressure 134/64, pulse (!) 110, temperature 98.2 °F (36.8 °C), resp. rate 19, height 5' 5\" (1.651 m), weight 187 lb (84.8 kg), SpO2 95 %. CVS-S1-S2 present, Irr  RS-   CTAB      Abdomen-  soft/NT  LE-   No  Edema    22    ECHO ADULT COMPLETE 2022    Interpretation Summary    Left Ventricle: Left ventricle size is normal. Normal wall thickness. Unable to assess wall motion. Hyperdynamic left ventricular systolic function with a visually estimated EF of 70 - 75%. Normal diastolic function.   Technical qualifiers: Echo study was technically difficult and limited due to patient's condition. Signed by: Chacorta Vargas DO on 2022  1:15 PM          A/P:  Afib with RVR - on dilt gtt/add amio gtt ; DCCV today. On eliquis    Discussed with patient/nursing    Courtney Jean MD, [facility] [place]    We discussed the expected course, resolution and complications of the diagnosis(es) in detail. Medication risks, benefits, costs, interactions, and alternatives were discussed as indicated. No intake/output data recorded.     Last 3 Recorded Weights in this Encounter 05/24/22 1039   Weight: 84.8 kg (187 lb)         05/23 1901 - 05/25 0700  In: 102.1 [I.V.:102.1]  Out: -     SUBJECTIVE      Admitted for a fib RVR noted at MD appt 5/24           Nani Murray reports none. Current Facility-Administered Medications   Medication Dose Route Frequency    potassium chloride 10 mEq in 100 ml IVPB  10 mEq IntraVENous Q1H    dilTIAZem (CARDIZEM) 100 mg in 0.9% sodium chloride (MBP/ADV) 100 mL infusion  0-15 mg/hr IntraVENous TITRATE    sodium chloride (NS) flush 5-40 mL  5-40 mL IntraVENous Q8H    sodium chloride (NS) flush 5-40 mL  5-40 mL IntraVENous PRN    acetaminophen (TYLENOL) tablet 650 mg  650 mg Oral Q6H PRN    Or    acetaminophen (TYLENOL) suppository 650 mg  650 mg Rectal Q6H PRN    polyethylene glycol (MIRALAX) packet 17 g  17 g Oral DAILY PRN    ondansetron (ZOFRAN ODT) tablet 4 mg  4 mg Oral Q8H PRN    Or    ondansetron (ZOFRAN) injection 4 mg  4 mg IntraVENous Q6H PRN    apixaban (ELIQUIS) tablet 5 mg  5 mg Oral BID    pantoprazole (PROTONIX) tablet 40 mg  40 mg Oral ACB     Current Outpatient Medications   Medication Sig    Eliquis 5 mg tablet Take 5 mg by mouth two (2) times a day.  pantoprazole (PROTONIX) 40 mg tablet Take 40 mg by mouth daily.  multivitamin (ONE A DAY) tablet Take 1 Tablet by mouth daily.       OBJECTIVE               Intake/Output Summary (Last 24 hours) at 5/25/2022 0916  Last data filed at 5/25/2022 0013  Gross per 24 hour   Intake 102.08 ml   Output --   Net 102.08 ml       Review of Systems - History obtained from the patient AS PER  HPI        PHYSICAL EXAM        Visit Vitals  /76   Pulse 94   Temp 98.2 °F (36.8 °C)   Resp 13   Ht 5' 5\" (1.651 m)   Wt 84.8 kg (187 lb)   SpO2 94%   BMI 31.12 kg/m²       Gen: Well-developed, well-nourished, in no acute distress  alert and oriented x 3  HEENT:  Pink conjunctivae, Hearing grossly normal.No scleral icterus or conjunctival, moist mucous membranes  Neck: Supple,No JVD, No Carotid Bruits  Resp: No accessory muscle use, Clear breath sounds, No rales or rhonchi  Card: Tachycardic, irregular Rate,Rythm,Normal S1, S2, No murmurs, rubs or gallop. MSK: No cyanosis or clubbing, good capillary refill  Skin: No rashes or ulcers, no bruising  Neuro:  Moving all four extremities, no focal deficit, follows commands appropriately  Psych:  Good insight, oriented to person, place and time, alert, Nml Affect  LE: No edema       DATA REVIEW      02/04/22    ECHO ADULT COMPLETE 02/14/2022 2/14/2022    Interpretation Summary    Left Ventricle: Left ventricle size is normal. Normal wall thickness. Unable to assess wall motion. Hyperdynamic left ventricular systolic function with a visually estimated EF of 70 - 75%. Normal diastolic function.   Technical qualifiers: Echo study was technically difficult and limited due to patient's condition. Signed by: Janiya Conde DO on 2/14/2022  1:15 PM        Cardiac monitor: a fib RVR         Laboratory and Imaging have been reviewed by me and are notable for  No results for input(s): CPK, CKMB, TROIQ in the last 72 hours.   Recent Labs     05/25/22  0016 05/24/22  1046    141   K 3.6 3.9   CO2 23 25   BUN 19 24*   CREA 0.74 0.80    122*   MG 2.1 2.1   WBC 9.5 9.8   HGB 11.4* 12.3   HCT 34.3* 38.2    371

## 2022-05-25 NOTE — PROCEDURES
Indication:  Atrial fibrillation. Procedure:  Informed consent was obtained. The patient was anesthetized the versed and fentanyl. The patient was converted from atrial fibrillation to normal sinus rhythm with a single biphasic shock of 200 joules. The patient tolerated the procedure well without obvious complications and was transferred to the holding room in stable condition. Summary:  Successful DCCV of atrial fibrillation to NSR. Roderick Sahu.  MD, [facility] [place]

## 2022-05-25 NOTE — PROGRESS NOTES
Bedside and Verbal shift change report given to Darryle Mart, RN (oncoming nurse) by Quin Barker RN (offgoing nurse). Report included the following information ED Summary and Cardiac Rhythm afib.

## 2022-05-25 NOTE — PROGRESS NOTES
TRANSFER - OUT REPORT:    Verbal report given to Kai Whitfield RN(name) on Nani Yoder being transferred to ED(unit) for routine post - op       Report consisted of patient's Situation, Background, Assessment and   Recommendations(SBAR). Information from the following report(s) Procedure Summary was reviewed with the receiving nurse. Opportunity for questions and clarification was provided.

## 2022-05-25 NOTE — DISCHARGE INSTRUCTIONS
HOSPITALIST DISCHARGE INSTRUCTIONS  NAME: Mini Martinez   :  1955   MRN:  [id number]     Date/Time:  2022 1:05 PM    ADMIT DATE: 2022     DISCHARGE DATE: 2022     ADMITTING DIAGNOSIS:  Atrial Fibrillation     DISCHARGE DIAGNOSIS:  Atrial Fibrillation     Electrical Cardioversion: Care Instructions  Your Care Instructions     Electrical cardioversion is a treatment for an abnormal heartbeat. For example, it may be used to treat atrial fibrillation. In cardioversion, a brief electric shock is given to the heart to reset its rhythm. The shock comes through patches that are put on the outside of your chest. Cardioversion most often restores the heartbeat to normal.  After the procedure, you may have redness where the patches were. (This may look like a sunburn.) Do not drive until the day after a cardioversion. You can eat and drink when you feel ready to. Your doctor may have you take medicines daily to help the heart beat in a normal way and to prevent blood clots. Your doctor may give you medicine before and after cardioversion. This is to help keep your heart in a normal rhythm after the procedure. Instead of electric cardioversion, your doctor may try to change your heartbeat to a normal rhythm by giving you medicine. This is most often done in a clinic or hospital.  You may have had a sedative to help you relax. You may be unsteady after having sedation. It can take a few hours for the medicine's effects to wear off. Common side effects of sedation include nausea, vomiting, and feeling sleepy or tired. The doctor has checked you carefully, but problems can develop later. If you notice any problems or new symptoms, get medical treatment right away. Follow-up care is a key part of your treatment and safety. Be sure to make and go to all appointments, and call your doctor if you are having problems.  It's also a good idea to know your test results and keep a list of the medicines you take.  How can you care for yourself at home? Medicines    · If the doctor gave you a sedative:  ? For 24 hours, don't do anything that requires attention to detail. This includes going to work, making important decisions, or signing any legal documents. It takes time for the medicine's effects to completely wear off.  ? For your safety, do not drive or operate any machinery that could be dangerous. Wait until the medicine wears off and you can think clearly and react easily.     · Take your medicines exactly as prescribed. Call your doctor if you think you are having a problem with your medicine. You may take some of the following medicines:  ? Antiarrhythmic medicines such as amiodarone. ? Beta-blockers such as propranolol (Inderal), metoprolol (Lopressor), and carvedilol (Coreg). ? Calcium channel blockers such as diltiazem (Cardizem) and verapamil (Calan). ? Digoxin (Lanoxin). You will get more details on the specific medicines your doctor prescribes.     · If you take a blood thinner, be sure you get instructions about how to take your medicine safely. Blood thinners can cause serious bleeding problems.     · Do not take any vitamins, over-the-counter medicines, or herbal products without talking to your doctor first.   Lifestyle changes    · Do not smoke. Smoking increases your risk of stroke and heart attack. If you need help quitting, talk to your doctor about stop-smoking programs and medicines. These can increase your chances of quitting for good.     · Eat heart-healthy foods.     · Limit alcohol to 2 drinks a day for men and 1 drink a day for women. Activity    · If your doctor recommends it, get more exercise. Walking is a good choice. Bit by bit, increase the amount you walk every day. Try for 30 minutes on most days of the week. You also may want to swim, bike, or do other activities.     · When you exercise, watch for signs that your heart is working too hard.  You are pushing too hard if you cannot talk while you are exercising. If you become short of breath or dizzy or have chest pain, sit down and rest immediately.     · Check your pulse regularly. Place two fingers on the artery at the palm side of your wrist in line with your thumb. If your heartbeat seems uneven or fast, talk to your doctor. When should you call for help? Call 911 anytime you think you may need emergency care. For example, call if:    · You have trouble breathing.     · You passed out (lost consciousness).     · You cough up pink, foamy mucus and you have trouble breathing.     · You have symptoms of a heart attack. These may include:  ? Chest pain or pressure, or a strange feeling in the chest.  ? Sweating. ? Shortness of breath. ? Nausea or vomiting. ? Pain, pressure, or a strange feeling in the back, neck, jaw, or upper belly or in one or both shoulders or arms. ? Lightheadedness or sudden weakness. ? A fast or irregular heartbeat. After you call 911, the  may tell you to chew 1 adult-strength or 2 to 4 low-dose aspirin. Wait for an ambulance. Do not try to drive yourself.     · You have symptoms of a stroke. These may include:  ? Sudden numbness, tingling, weakness, or loss of movement in your face, arm, or leg, especially on only one side of your body. ? Sudden vision changes. ? Sudden trouble speaking. ? Sudden confusion or trouble understanding simple statements. ? Sudden problems with walking or balance. ? A sudden, severe headache that is different from past headaches. Call your doctor now or seek immediate medical care if:    · You have new or worse nausea or vomiting.     · You have new or increased shortness of breath.     · You are dizzy or lightheaded, or you feel like you may faint.     · You have sudden weight gain, such as more than 2 to 3 pounds in a day or 5 pounds in a week.     · You have increased swelling in your legs, ankles, or feet.    Watch closely for changes in your health, and be sure to contact your doctor if you have any problems. Where can you learn more? Go to http://www.gray.com/  Enter O416 in the search box to learn more about \"Electrical Cardioversion: Care Instructions. \"  Current as of: April 29, 2021               Content Version: 13.0  © 9440-3538 [employer]. Care instructions adapted under license by [employer] (which disclaims liability or warranty for this information). If you have questions about a medical condition or this instruction, always ask your healthcare professional. [place] [address] any warranty or liability for your use of this information. Patient Education        Atrial Fibrillation: Care Instructions  Your Care Instructions     Atrial fibrillation is an irregular and often fast heartbeat. Treating this condition is important for several reasons. It can cause blood clots, which can travel from your heart to your brain and cause a stroke. If you have a fast heartbeat, you may feel lightheaded, dizzy, and weak. An irregular heartbeat can also increase your risk for heart failure. Atrial fibrillation is often the result of another heart condition, such as high blood pressure or coronary artery disease. Making changes to improve your heart condition will help you stay healthy and active. Follow-up care is a key part of your treatment and safety. Be sure to make and go to all appointments, and call your doctor if you are having problems. It's also a good idea to know your test results and keep a list of the medicines you take. How can you care for yourself at home? Medicines    · Take your medicines exactly as prescribed. Call your doctor if you think you are having a problem with your medicine.  You will get more details on the specific medicines your doctor prescribes.     · If your doctor has given you a blood thinner to prevent a stroke, be sure you get instructions about how to take your medicine safely. Blood thinners can cause serious bleeding problems.     · Do not take any vitamins, over-the-counter drugs, or herbal products without talking to your doctor first.   Lifestyle changes    · Do not smoke. Smoking can increase your chance of a stroke and heart attack. If you need help quitting, talk to your doctor about stop-smoking programs and medicines. These can increase your chances of quitting for good.     · Eat a heart-healthy diet.     · Stay at a healthy weight. Lose weight if you need to.     · Limit alcohol to 2 drinks a day for men and 1 drink a day for women. Too much alcohol can cause health problems.     · Avoid colds and flu. Get a pneumococcal vaccine shot. If you have had one before, ask your doctor whether you need another dose. Get a flu shot every year. If you must be around people with colds or flu, wash your hands often. Activity    · If your doctor recommends it, get more exercise. Walking is a good choice. Bit by bit, increase the amount you walk every day. Try for at least 30 minutes on most days of the week. You also may want to swim, bike, or do other activities. Your doctor may suggest that you join a cardiac rehabilitation program so that you can have help increasing your physical activity safely.     · Start light exercise if your doctor says it is okay. Even a small amount will help you get stronger, have more energy, and manage stress. Walking is an easy way to get exercise. Start out by walking a little more than you did in the hospital. Gradually increase the amount you walk.     · When you exercise, watch for signs that your heart is working too hard. You are pushing too hard if you cannot talk while you are exercising. If you become short of breath or dizzy or have chest pain, sit down and rest immediately.     · Check your pulse regularly. Place two fingers on the artery at the palm side of your wrist, in line with your thumb.  If your heartbeat seems uneven or fast, talk to your doctor. When should you call for help? Call 911 anytime you think you may need emergency care. For example, call if:    · You have symptoms of a heart attack. These may include:  ? Chest pain or pressure, or a strange feeling in the chest.  ? Sweating. ? Shortness of breath. ? Nausea or vomiting. ? Pain, pressure, or a strange feeling in the back, neck, jaw, or upper belly or in one or both shoulders or arms. ? Lightheadedness or sudden weakness. ? A fast or irregular heartbeat. After you call 911, the  may tell you to chew 1 adult-strength or 2 to 4 low-dose aspirin. Wait for an ambulance. Do not try to drive yourself.     · You have symptoms of a stroke. These may include:  ? Sudden numbness, tingling, weakness, or loss of movement in your face, arm, or leg, especially on only one side of your body. ? Sudden vision changes. ? Sudden trouble speaking. ? Sudden confusion or trouble understanding simple statements. ? Sudden problems with walking or balance. ? A sudden, severe headache that is different from past headaches.     · You passed out (lost consciousness). Call your doctor now or seek immediate medical care if:    · You have new or increased shortness of breath.     · You feel dizzy or lightheaded, or you feel like you may faint.     · Your heart rate becomes irregular.     · You can feel your heart flutter in your chest or skip heartbeats. Tell your doctor if these symptoms are new or worse. Watch closely for changes in your health, and be sure to contact your doctor if you have any problems. Where can you learn more? Go to http://www.gray.com/  Enter U020 in the search box to learn more about \"Atrial Fibrillation: Care Instructions. \"  Current as of: January 10, 2022               Content Version: 13.2  © 4004-1299 [employer].    Care instructions adapted under license by [employer] (which disclaims liability or warranty for this information). If you have questions about a medical condition or this instruction, always ask your healthcare professional. [place] [address] any warranty or liability for your use of this information. MEDICATIONS:     · It is important that you take the medication exactly as they are prescribed. · Keep your medication in the bottles provided by the pharmacist and keep a list of the medication names, dosages, and times to be taken in your wallet. · Do not take other medications without consulting your doctor     Pain Management: per above medications    What to do at Home    Recommended diet:  Cardiac Diet    Recommended activity: Activity as tolerated    1) Return to the hospital if you feel worse    2) If you experience any of the following symptoms then please call your primary care physician or return to the emergency room if you cannot get hold of your doctor:  Fever, chills, nausea, vomiting, diarrhea, change in mentation, falling, bleeding, shortness of breath, chest pain, severe headache, severe abdominal pain. Follow Up: Follow-up Information     Follow up With Specialties Details Why Contact Info    Maggie Asencio MD Cardiovascular Disease Physician On 6/8/2022 1:40 pm  [address] [address] [facility]  [phone]      Amy Miller MD Family Medicine Schedule an appointment as soon as possible for a visit in 1 week Please follow up with your primary care doctor within 1 week  [address] [facility] [address] [address] [phone]              Information obtained by :  I understand that if any problems occur once I am at home I am to contact my physician. I understand and acknowledge receipt of the instructions indicated above.                                                                                                                                            Physician's or R.N.'s Signature                                                                  Date/Time                                                                                                                                              Patient or Representative Signature                                                          Date/Time

## 2022-05-26 LAB
ATRIAL RATE: 119 BPM
ATRIAL RATE: 156 BPM
CALCULATED R AXIS, ECG10: 11 DEGREES
CALCULATED R AXIS, ECG10: 4 DEGREES
CALCULATED T AXIS, ECG11: 9 DEGREES
CALCULATED T AXIS, ECG11: 9 DEGREES
DIAGNOSIS, 93000: NORMAL
DIAGNOSIS, 93000: NORMAL
Q-T INTERVAL, ECG07: 270 MS
Q-T INTERVAL, ECG07: 278 MS
QRS DURATION, ECG06: 78 MS
QRS DURATION, ECG06: 80 MS
QTC CALCULATION (BEZET), ECG08: 381 MS
QTC CALCULATION (BEZET), ECG08: 415 MS
VENTRICULAR RATE, ECG03: 113 BPM
VENTRICULAR RATE, ECG03: 142 BPM

## 2022-05-27 LAB
ATRIAL RATE: 85 BPM
CALCULATED P AXIS, ECG09: 47 DEGREES
CALCULATED T AXIS, ECG11: 13 DEGREES
DIAGNOSIS, 93000: NORMAL
P-R INTERVAL, ECG05: 158 MS
Q-T INTERVAL, ECG07: 358 MS
QRS DURATION, ECG06: 78 MS
QTC CALCULATION (BEZET), ECG08: 426 MS
VENTRICULAR RATE, ECG03: 85 BPM

## 2022-05-27 NOTE — PROGRESS NOTES
Physician Progress Note      PATIENT:               Carson Sawyer  CSN #:                  [id number]  :                       1955  ADMIT DATE:       2022 10:33 AM  DISCH DATE:        2022 3:25 PM  RESPONDING  PROVIDER #:        Kelli MARINELLI DO          QUERY TEXT:    Good Morning,    This patient admitted for A-fib with RVR . The patient is maintained at home on Eliquis and Eliquis was continued during this admission. If possible, please document in progress notes and discharge summary if you are evaluating and/or treating any of the following: The medical record reflects the following:  Risk Factors: A-fib, Recent history of PE Pt is on Eliquis at home  Clinical Indicators: Pt A-fib is maintained at home on Eliquis  Treatment: Pt required cardio version, started on po Cardizem and then continued on Eliquis    Thank you  [address], [address] [place] [address], [facility]  [phone]  Options provided:  -- Secondary hypercoagulable state in a patient with atrial fibrillation  -- Other - I will add my own diagnosis  -- Disagree - Not applicable / Not valid  -- Disagree - Clinically unable to determine / Unknown  -- Refer to Clinical Documentation Reviewer    PROVIDER RESPONSE TEXT:    This patient has secondary hypercoagulable state related to atrial fibrillation.     Query created by: Brian Carvalho on 2022 9:27 AM      Electronically signed by:  Shawna Bustamante DO 2022 6:07 PM

## 2022-06-08 ENCOUNTER — OFFICE VISIT (OUTPATIENT)
Dept: CARDIOLOGY CLINIC | Age: 67
End: 2022-06-08
Payer: COMMERCIAL

## 2022-06-08 VITALS
HEIGHT: 65 IN | WEIGHT: 187 LBS | SYSTOLIC BLOOD PRESSURE: 148 MMHG | HEART RATE: 88 BPM | DIASTOLIC BLOOD PRESSURE: 58 MMHG | BODY MASS INDEX: 31.16 KG/M2 | OXYGEN SATURATION: 97 %

## 2022-06-08 DIAGNOSIS — I48.0 PAF (PAROXYSMAL ATRIAL FIBRILLATION) (HCC): Primary | ICD-10-CM

## 2022-06-08 DIAGNOSIS — I26.99 PULMONARY EMBOLISM WITHOUT ACUTE COR PULMONALE, UNSPECIFIED CHRONICITY, UNSPECIFIED PULMONARY EMBOLISM TYPE (HCC): ICD-10-CM

## 2022-06-08 PROCEDURE — 3017F COLORECTAL CA SCREEN DOC REV: CPT | Performed by: SPECIALIST

## 2022-06-08 PROCEDURE — G8536 NO DOC ELDER MAL SCRN: HCPCS | Performed by: SPECIALIST

## 2022-06-08 PROCEDURE — 1101F PT FALLS ASSESS-DOCD LE1/YR: CPT | Performed by: SPECIALIST

## 2022-06-08 PROCEDURE — G8417 CALC BMI ABV UP PARAM F/U: HCPCS | Performed by: SPECIALIST

## 2022-06-08 PROCEDURE — 1090F PRES/ABSN URINE INCON ASSESS: CPT | Performed by: SPECIALIST

## 2022-06-08 PROCEDURE — G8427 DOCREV CUR MEDS BY ELIG CLIN: HCPCS | Performed by: SPECIALIST

## 2022-06-08 PROCEDURE — 99214 OFFICE O/P EST MOD 30 MIN: CPT | Performed by: SPECIALIST

## 2022-06-08 PROCEDURE — G8510 SCR DEP NEG, NO PLAN REQD: HCPCS | Performed by: SPECIALIST

## 2022-06-08 PROCEDURE — G8400 PT W/DXA NO RESULTS DOC: HCPCS | Performed by: SPECIALIST

## 2022-06-08 PROCEDURE — 1123F ACP DISCUSS/DSCN MKR DOCD: CPT | Performed by: SPECIALIST

## 2022-06-08 RX ORDER — DILTIAZEM HYDROCHLORIDE 240 MG/1
240 CAPSULE, COATED, EXTENDED RELEASE ORAL DAILY
Qty: 90 CAPSULE | Refills: 3 | Status: SHIPPED | OUTPATIENT
Start: 2022-06-08

## 2022-06-08 RX ORDER — APIXABAN 5 MG/1
5 TABLET, FILM COATED ORAL 2 TIMES DAILY
Qty: 180 TABLET | Refills: 3 | Status: SHIPPED | OUTPATIENT
Start: 2022-06-08

## 2022-06-08 RX ORDER — ROSUVASTATIN CALCIUM 5 MG/1
5 TABLET, COATED ORAL
Qty: 90 TABLET | Refills: 3 | Status: SHIPPED | OUTPATIENT
Start: 2022-06-08

## 2022-06-08 RX ORDER — ESCITALOPRAM OXALATE 10 MG/1
10 TABLET ORAL DAILY
COMMUNITY
Start: 2022-05-24

## 2022-06-08 NOTE — PROGRESS NOTES
Chief Complaint   Patient presents with   [facility] Follow Up     ED 5/24/22 afib w RVR     Vitals:    06/08/22 1339 06/08/22 1345   BP: (!) 156/50 (!) 148/58   BP 1 Location: Left upper arm Right upper arm   BP Patient Position: Sitting Sitting   BP Cuff Size: Large adult Large adult   Pulse: 88    Height: 5' 5\" (1.651 m)    Weight: 187 lb (84.8 kg)    SpO2: 97%      Chest pain denied   SOB denied   Palpitations denied   Swelling in hands/feet denied   Dizziness denied   Recent hospital stays denied   Refills cards meds    Feels fine, but didn't know was in afib. Gets very anxious in medical settings. Just started Lexapro. Started 10 days ago.

## 2022-06-08 NOTE — PROGRESS NOTES
Ananya Combs MD. [facility]              Patient: Stephanie Door  : 1955      Today's Date: 2022          HISTORY OF PRESENT ILLNESS:     History of Present Illness:    Admitted 22 with afib with RVR - was cardioverted. She is feeling better and getting PT - getting stronger slowly. Denies prior history of MI, CHF, or Afib. She denies CP or heart racing. BP is OK at home. Feels fine lately. PAST MEDICAL HISTORY:     Past Medical History:   Diagnosis Date    Anxiety     COVID-19 vaccine administered     boostered   Pfizer    History of hip replacement     22    [facility]     22    Pulmonary embolism ([facility]     2/10/22    White coat syndrome without diagnosis of hypertension        Past Surgical History:   Procedure Laterality Date    HX COLONOSCOPY  2021    HX DILATION AND CURETTAGE      HX ORTHOPAEDIC  2022    right hip replaced          MEDICATIONS:     Current Outpatient Medications   Medication Sig Dispense Refill    escitalopram oxalate (LEXAPRO) 10 mg tablet Take 10 mg by mouth daily.  dilTIAZem IR (CARDIZEM) 30 mg tablet Take 1 Tablet by mouth Before breakfast, lunch, and dinner. 90 Tablet 0    Eliquis 5 mg tablet Take 5 mg by mouth two (2) times a day.  pantoprazole (PROTONIX) 40 mg tablet Take 40 mg by mouth daily.  multivitamin (ONE A DAY) tablet Take 1 Tablet by mouth daily.          No Known Allergies        SOCIAL HISTORY:     Social History     Tobacco Use    Smoking status: Never Smoker    Smokeless tobacco: Never Used   Vaping Use    Vaping Use: Never used   Substance Use Topics    Alcohol use: Not Currently     Comment: social    Drug use: Never         FAMILY HISTORY:     Family History   Problem Relation Age of Onset    Heart Disease Mother     Heart Disease Father     Clotting Disorder Neg Hx            REVIEW OF SYMPTOMS:     Review of Symptoms:  Constitutional: Negative for fever, chills  HEENT: Negative for nosebleeds, tinnitus, and vision changes. Respiratory: Negative for cough, wheezing  Cardiovascular: Negative for orthopnea, claudication, syncope, and PND. Gastrointestinal: Negative for abdominal pain, diarrhea, melena. Genitourinary: Negative for dysuria  Musculoskeletal: Negative for myalgias. Skin: Negative for rash  Heme: No problems bleeding. Neurological: Negative for speech change and focal weakness. PHYSICAL EXAM:     Physical Exam:  Visit Vitals  BP (!) 148/58 (BP 1 Location: Right upper arm, BP Patient Position: Sitting, BP Cuff Size: Large adult)   Pulse 88   Ht 5' 5\" (1.651 m)   Wt 187 lb (84.8 kg)   SpO2 97%   BMI 31.12 kg/m²     Patient appears generally well, mood and affect are appropriate and pleasant. HEENT:  Hearing intact, non-icteric, normocephalic, atraumatic. Neck Exam: Supple, No JVD   Lung Exam: Clear to auscultation, even breath sounds. Cardiac Exam: RRR with no murmur or rub  Abdomen: Soft, non-tender, normal bowel sounds. Extremities: Moves all ext well. No lower extremity edema. MSKTL: Overall good ROM ext  Skin: No significant rashes  Psych: Appropriate affect  Neuro - Grossly intact      LABS / OTHER STUDIES reviewed:       Lab Results   Component Value Date/Time    Sodium 139 05/25/2022 12:16 AM    Potassium 3.6 05/25/2022 12:16 AM    Chloride 108 05/25/2022 12:16 AM    CO2 23 05/25/2022 12:16 AM    Anion gap 8 05/25/2022 12:16 AM    Glucose 100 05/25/2022 12:16 AM    BUN 19 05/25/2022 12:16 AM    Creatinine 0.74 05/25/2022 12:16 AM    BUN/Creatinine ratio 26 (H) 05/25/2022 12:16 AM    GFR est AA >60 05/25/2022 12:16 AM    GFR est non-AA >60 05/25/2022 12:16 AM    Calcium 9.0 05/25/2022 12:16 AM    Bilirubin, total 0.4 05/25/2022 12:16 AM    Alk.  phosphatase 150 (H) 05/25/2022 12:16 AM    Protein, total 7.2 05/25/2022 12:16 AM    Albumin 3.1 (L) 05/25/2022 12:16 AM    Globulin 4.1 (H) 05/25/2022 12:16 AM    A-G Ratio 0.8 (L) 05/25/2022 12:16 AM    ALT (SGPT) 23 05/25/2022 12:16 AM    AST (SGOT) 16 05/25/2022 12:16 AM       Lab Results   Component Value Date/Time    WBC 9.5 05/25/2022 12:16 AM    HGB 11.4 (L) 05/25/2022 12:16 AM    HCT 34.3 (L) 05/25/2022 12:16 AM    PLATELET 745 51/82/0313 12:16 AM    MCV 86.0 05/25/2022 12:16 AM     Lab Results   Component Value Date/Time    TSH 0.56 05/24/2022 10:46 AM               CARDIAC DIAGNOSTICS:     Cardiac Evaluation Includes:  I reviewed the results below. EKG 1/24/22 - sinus tach, PRWP   EKG 2/13/22 # 1- sinus tach   EKG 2/13/22 # 2 - Afib with RVR, NSST changes  EKG 4/19/22 - sinus tach, NSST changes   EKG 5/24/22 - Afib with RVR    CTA chest 2/7/22 - findings include -  Mild coronary artery calcification. CTA chest 2/10/22 -  1. Right upper and lower lobe pulmonary emboli. 2. Slight increase in size of rim-enhancing complex collection in the cul-de-sac of the pelvis concerning for abscess. 3. Gas and fluid containing collection in the soft tissues anterior to the right hip concerning for abscess. 4. Increased left greater than right pleural effusions. Echo 2/14/22 - TDS. LVEF 70-75%     Event Monitor 5/1/22 - wore it 27 days. Primary NSR, Avg HR 82. Afib occurred 46 times with HR , Avg 106 bpm - Afib burden 5%          ASSESSMENT AND PLAN:     Assessment and Plan:    She had a prolonged hospitalization after hip replacement 1/31/22 - p/w abd pain to ED 2/4/22 and found to have a perforated gastric ulcer and then later aspiration PNA, acute PE and afib with RVR. Regarding Afib, she spont converted to NSR before discharge. 1) PAF  - initially discovered 2/22  - Her  [id number] score is 2 (female and age)   - Event Monitor 5/1/22 - wore it 27 days. Primary NSR, Avg HR 82.   Afib occurred 46 times with HR , Avg 106 bpm - Afib burden 5%  - Her Afib was asymptomatic.   ---> will continue cardizem for rate control --> Will increase Cardizem to 240 mg daily ---> Continue lifelong OAC (eliquis)     2) Mild atherosclerosis on CT   - CTA chest 2/7/22 - findings include -  Mild coronary artery calcification. - recommend statin for primary prevention     3) Dyslipidemia   - LDL was 113 in 5/2021   ----> start crestor 5 mg daily - FU lipids with PCP     4) Thyroid nodule / mass   - seeing Endocrine     5) Acute PE   - She had a provoked PE in setting of recent surgeries 1/22  - needs lifelong OAC given PAF    6) White coat HTN  - BP OK at home despite being high in the office    7) Anemia   - multifactorial   - Seeing Dr. Dwayne Pruitt    8) See NP in 6 months       Derrek Ocampo MD, [address]  [address] [facility], [facility] [address]  [address]  [address]  Ph: [phone]   Ph [phone]

## 2022-07-15 ENCOUNTER — TELEPHONE (OUTPATIENT)
Dept: CARDIOLOGY CLINIC | Age: 67
End: 2022-07-15

## 2022-07-15 NOTE — TELEPHONE ENCOUNTER
Patient is calling in regards to an endoscopy that is scheduled on 7.27.22 at [facility]. She would like to know if she is able to come off her Eliquis prior before the appt. Please Advise.      Nani   [phone]

## 2022-07-15 NOTE — TELEPHONE ENCOUNTER
R/t call,  Relayed msg to pt,  Per Dr. Maurice Crews: \"Hold eliquis 36 hours prior - resume afterwards \"

## 2022-08-10 ENCOUNTER — OFFICE VISIT (OUTPATIENT)
Dept: ONCOLOGY | Age: 67
End: 2022-08-10

## 2022-08-10 VITALS
BODY MASS INDEX: 31.49 KG/M2 | WEIGHT: 189 LBS | HEIGHT: 65 IN | RESPIRATION RATE: 18 BRPM | HEART RATE: 94 BPM | OXYGEN SATURATION: 97 % | DIASTOLIC BLOOD PRESSURE: 74 MMHG | TEMPERATURE: 98 F | SYSTOLIC BLOOD PRESSURE: 134 MMHG

## 2022-08-10 DIAGNOSIS — I26.99 OTHER ACUTE PULMONARY EMBOLISM WITHOUT ACUTE COR PULMONALE (HCC): Primary | ICD-10-CM

## 2022-08-10 DIAGNOSIS — D64.9 NORMOCYTIC ANEMIA: ICD-10-CM

## 2022-08-10 DIAGNOSIS — Z79.899 HIGH RISK MEDICATION USE: ICD-10-CM

## 2022-08-10 PROCEDURE — G8510 SCR DEP NEG, NO PLAN REQD: HCPCS | Performed by: INTERNAL MEDICINE

## 2022-08-10 PROCEDURE — G8536 NO DOC ELDER MAL SCRN: HCPCS | Performed by: INTERNAL MEDICINE

## 2022-08-10 PROCEDURE — G8417 CALC BMI ABV UP PARAM F/U: HCPCS | Performed by: INTERNAL MEDICINE

## 2022-08-10 PROCEDURE — G8427 DOCREV CUR MEDS BY ELIG CLIN: HCPCS | Performed by: INTERNAL MEDICINE

## 2022-08-10 PROCEDURE — 1123F ACP DISCUSS/DSCN MKR DOCD: CPT | Performed by: INTERNAL MEDICINE

## 2022-08-10 PROCEDURE — 3017F COLORECTAL CA SCREEN DOC REV: CPT | Performed by: INTERNAL MEDICINE

## 2022-08-10 PROCEDURE — G8400 PT W/DXA NO RESULTS DOC: HCPCS | Performed by: INTERNAL MEDICINE

## 2022-08-10 PROCEDURE — 1101F PT FALLS ASSESS-DOCD LE1/YR: CPT | Performed by: INTERNAL MEDICINE

## 2022-08-10 PROCEDURE — 99214 OFFICE O/P EST MOD 30 MIN: CPT | Performed by: INTERNAL MEDICINE

## 2022-08-10 PROCEDURE — 1090F PRES/ABSN URINE INCON ASSESS: CPT | Performed by: INTERNAL MEDICINE

## 2022-08-10 NOTE — LETTER
8/11/2022    Patient: Zhao Potter   YOB: 1955   Date of Visit: 8/10/2022     Wilfredo Devi MD  [address] [place] [address]  Via Fax: [phone]    Dear Wilfredo Devi MD,      Thank you for referring Ms. Nani Murray to [facility] for evaluation. My notes for this consultation are attached. If you have questions, please do not hesitate to call me. I look forward to following your patient along with you.       Sincerely,    Juan Casey MD

## 2022-08-10 NOTE — PROGRESS NOTES
1. Have you been to the ER, urgent care clinic since your last visit? Hospitalized since your last visit? No    2. Have you seen or consulted any other health care providers outside of the [address] [place] [address] since your last visit? Include any pap smears or colon screening.  No        Chief Complaint   Patient presents with    Follow-up     Follow up

## 2022-08-29 NOTE — TELEPHONE ENCOUNTER
Magalis from [facility] reporting an urgent EKG.      Event 1: 6:21 am auto trigger onset afib criteria HR of 177 beats per minute   Event 2: 7:58 am afib severe tachycardia criteria 193 beats per minute     Did say she uploaded it to the site/portal.   Jarrell Trejo [phone] with patient

## 2022-11-17 LAB — HBA1C MFR BLD HPLC: 5.7 %

## 2023-01-27 ENCOUNTER — OFFICE VISIT (OUTPATIENT)
Dept: CARDIOLOGY CLINIC | Age: 68
End: 2023-01-27
Payer: MEDICARE

## 2023-01-27 VITALS
OXYGEN SATURATION: 98 % | HEART RATE: 96 BPM | SYSTOLIC BLOOD PRESSURE: 158 MMHG | HEIGHT: 65 IN | BODY MASS INDEX: 32.65 KG/M2 | WEIGHT: 196 LBS | DIASTOLIC BLOOD PRESSURE: 60 MMHG

## 2023-01-27 DIAGNOSIS — I48.0 PAF (PAROXYSMAL ATRIAL FIBRILLATION) (HCC): Primary | ICD-10-CM

## 2023-01-27 DIAGNOSIS — I26.99 OTHER ACUTE PULMONARY EMBOLISM WITHOUT ACUTE COR PULMONALE (HCC): ICD-10-CM

## 2023-01-27 DIAGNOSIS — I10 PRIMARY HYPERTENSION: ICD-10-CM

## 2023-01-27 NOTE — PROGRESS NOTES
Tyler Ramirez MD. [facility] [place]              Patient: Quinn Kramer  : 1955      Today's Date: 2023          HISTORY OF PRESENT ILLNESS:     History of Present Illness:    Admitted 22 with afib with RVR - was cardioverted. She is doing OK recently - no recent cardiac racing. No obvious afib. BP at home looks good 124/60 yesterday. PAST MEDICAL HISTORY:     Past Medical History:   Diagnosis Date    Anxiety     COVID-19 vaccine administered     boostered   Pfizer    History of hip replacement     22    PAF (paroxysmal atrial fibrillation) (HCC)     Perforated gastric ulcer ([facility] [address]     22    Pulmonary embolism ([facility] [address]     2/10/22    White coat syndrome without diagnosis of hypertension        Past Surgical History:   Procedure Laterality Date    HX COLONOSCOPY  2021    HX DILATION AND CURETTAGE      HX ORTHOPAEDIC  2022    right hip replaced          MEDICATIONS:     Current Outpatient Medications   Medication Sig Dispense Refill    escitalopram oxalate (LEXAPRO) 10 mg tablet Take 10 mg by mouth daily. dilTIAZem ER (CARDIZEM CD) 240 mg capsule Take 1 Capsule by mouth daily. 90 Capsule 3    rosuvastatin (CRESTOR) 5 mg tablet Take 1 Tablet by mouth nightly. 90 Tablet 3    Eliquis 5 mg tablet Take 1 Tablet by mouth two (2) times a day. 180 Tablet 3    multivitamin (ONE A DAY) tablet Take 1 Tablet by mouth daily.          No Known Allergies        SOCIAL HISTORY:     Social History     Tobacco Use    Smoking status: Never    Smokeless tobacco: Never   Vaping Use    Vaping Use: Never used   Substance Use Topics    Alcohol use: Not Currently     Comment: social    Drug use: Never         FAMILY HISTORY:     Family History   Problem Relation Age of Onset    Heart Disease Mother     Heart Disease Father     Clotting Disorder Neg Hx            REVIEW OF SYMPTOMS:     Review of Symptoms:  Constitutional: Negative for fever, chills  HEENT: Negative for nosebleeds, tinnitus, and vision changes. Respiratory: Negative for cough, wheezing  Cardiovascular: Negative for orthopnea, claudication, syncope, and PND. Gastrointestinal: Negative for abdominal pain, diarrhea, melena. Genitourinary: Negative for dysuria  Musculoskeletal: Negative for myalgias. Skin: Negative for rash  Heme: No problems bleeding. Neurological: Negative for speech change and focal weakness. PHYSICAL EXAM:     Physical Exam:  Visit Vitals  BP (!) 158/60 (BP 1 Location: Left upper arm, BP Patient Position: Sitting, BP Cuff Size: Adult)   Pulse 96   Ht 5' 5\" (1.651 m)   Wt 196 lb (88.9 kg)   SpO2 98%   BMI 32.62 kg/m²     Patient appears generally well, mood and affect are appropriate and pleasant. HEENT:  Hearing intact, non-icteric, normocephalic, atraumatic. Neck Exam: Supple, No JVD   Lung Exam: Clear to auscultation, even breath sounds. Cardiac Exam: RRR with no murmur or rub  Abdomen: Soft, non-tender, normal bowel sounds. Extremities: Moves all ext well. No lower extremity edema. MSKTL: Overall good ROM ext  Skin: No significant rashes  Psych: Appropriate affect  Neuro - Grossly intact      LABS / OTHER STUDIES reviewed:       Lab Results   Component Value Date/Time    Sodium 139 05/25/2022 12:16 AM    Potassium 3.6 05/25/2022 12:16 AM    Chloride 108 05/25/2022 12:16 AM    CO2 23 05/25/2022 12:16 AM    Anion gap 8 05/25/2022 12:16 AM    Glucose 100 05/25/2022 12:16 AM    BUN 19 05/25/2022 12:16 AM    Creatinine 0.74 05/25/2022 12:16 AM    BUN/Creatinine ratio 26 (H) 05/25/2022 12:16 AM    GFR est AA >60 05/25/2022 12:16 AM    GFR est non-AA >60 05/25/2022 12:16 AM    Calcium 9.0 05/25/2022 12:16 AM    Bilirubin, total 0.4 05/25/2022 12:16 AM    Alk.  phosphatase 150 (H) 05/25/2022 12:16 AM    Protein, total 7.2 05/25/2022 12:16 AM    Albumin 3.1 (L) 05/25/2022 12:16 AM    Globulin 4.1 (H) 05/25/2022 12:16 AM    A-G Ratio 0.8 (L) 05/25/2022 12:16 AM    ALT (SGPT) 23 05/25/2022 12:16 AM    AST (SGOT) 16 05/25/2022 12:16 AM       Lab Results   Component Value Date/Time    WBC 9.5 05/25/2022 12:16 AM    HGB 11.4 (L) 05/25/2022 12:16 AM    HCT 34.3 (L) 05/25/2022 12:16 AM    PLATELET 184 59/14/4975 12:16 AM    MCV 86.0 05/25/2022 12:16 AM     Lab Results   Component Value Date/Time    TSH 0.56 05/24/2022 10:46 AM               CARDIAC DIAGNOSTICS:     Cardiac Evaluation Includes:  I reviewed the results below. EKG 1/24/22 - sinus tach, PRWP   EKG 2/13/22 # 1- sinus tach   EKG 2/13/22 # 2 - Afib with RVR, NSST changes  EKG 4/19/22 - sinus tach, NSST changes   EKG 5/24/22 - Afib with RVR  EKG 5/25/22 - NSR      CTA chest 2/7/22 - findings include -  Mild coronary artery calcification. CTA chest 2/10/22 -  1. Right upper and lower lobe pulmonary emboli. 2. Slight increase in size of rim-enhancing complex collection in the cul-de-sac of the pelvis concerning for abscess. 3. Gas and fluid containing collection in the soft tissues anterior to the right hip concerning for abscess. 4. Increased left greater than right pleural effusions. Echo 2/14/22 - TDS. LVEF 70-75%     Event Monitor 5/1/22 - wore it 27 days. Primary NSR, Avg HR 82. Afib occurred 46 times with HR , Avg 106 bpm - Afib burden 5%          ASSESSMENT AND PLAN:     Assessment and Plan:    She had a prolonged hospitalization after hip replacement 1/31/22 - p/w abd pain to ED 2/4/22 and found to have a perforated gastric ulcer and then later aspiration PNA, acute PE and afib with RVR. Regarding Afib, she spont converted to NSR before discharge. 1) PAF  - initially discovered 2/22  - Her  [id number] score is 3 (HTN, female and age)   - Event Monitor 5/1/22 - wore it 27 days. Primary NSR, Avg HR 82.   Afib occurred 46 times with HR , Avg 106 bpm - Afib burden 5%  - Her Afib has been asymptomatic.   ---> will continue cardizem for rate control   ---> Continue OAC (eliquis) ---> discussed Watchman as option and she may consider it in future  ---> Will have her see Dr. Maria E Guardado    2) Mild atherosclerosis on CT   - CTA chest 2/7/22 - findings include -  Mild coronary artery calcification. - recommend statin for primary prevention     3) Dyslipidemia   - LDL was 113 in 5/2021   - cont crestor   - FU labs were better per patient     4) Thyroid nodule / mass   - seeing Endocrine     5) Provoked Acute PE   - She had a provoked PE in setting of recent surgeries 1/22    6) White coat HTN  - BP OK at home despite being high in the office  - Cont cardizem     7) See Dr. Maria E Guardado when possible. See me in 6 months. 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 Fernando Bartlett MD, [place] [address]  [address] [facility] [address], [address]  [place] [address]  [address]  [address]  Ph: [phone]   Ph [phone]

## 2023-01-27 NOTE — PROGRESS NOTES
Chief Complaint   Patient presents with    Follow-up     6 months    Irregular Heart Beat     A fib    Other     Hx PE     Vitals:    01/27/23 0956 01/27/23 1007   BP: (!) 180/60 (!) 158/60   BP 1 Location: Left upper arm Left upper arm   BP Patient Position: Sitting Sitting   BP Cuff Size: Adult Adult   Pulse: 96    Height: 5' 5\" (1.651 m)    Weight: 196 lb (88.9 kg)    SpO2: 98%      Chest pain denied   SOB denied   Palpitations denied   Swelling in hands/feet denied   Dizziness denied   Recent hospital stays denied   Refills denied     White coat syndrome; home SBP was 128.

## 2023-05-07 NOTE — ED NOTES
MARISELA changed to level 3 based on tachycardia and severity of pain.
Pre-op stated they cannot take pt until rapid COVID test resulted. Primary RN & charge RN aware. Performed & sent to lab.
Pt taken to XR via w/c. ER charge RN notified need for room.     1105: XR notified pt to bed ER 16 when done with XR
Pt to pre op
neck pain since yesterday. no trauma or fevers reported.

## 2023-05-16 RX ORDER — DILTIAZEM HYDROCHLORIDE 240 MG/1
240 CAPSULE, EXTENDED RELEASE ORAL DAILY
COMMUNITY
Start: 2022-06-08

## 2023-05-16 RX ORDER — ROSUVASTATIN CALCIUM 5 MG/1
1 TABLET, COATED ORAL NIGHTLY
COMMUNITY
Start: 2022-06-08

## 2023-05-16 RX ORDER — ESCITALOPRAM OXALATE 10 MG/1
10 TABLET ORAL DAILY
COMMUNITY
Start: 2022-05-24

## 2023-05-16 NOTE — TELEPHONE ENCOUNTER
escitalopram oxalate (LEXAPRO) 10 mg tablet Take 10 mg by mouth daily. dilTIAZem ER (CARDIZEM CD) 240 mg capsule Take 1 Capsule by mouth daily. 90 Capsule 3    rosuvastatin (CRESTOR) 5 mg tablet Take 1 Tablet by mouth nightly. 90 Tablet 3    Eliquis 5 mg tablet Take 1 Tablet by mouth two (2) times a day. 180 Tablet 3    multivitamin (ONE A DAY) tablet Take 1 Tablet by mouth daily.      1/27/23  Refill per VO of Dr. Soledad Curry  Last appt: 1/27/2023    Future Appointments   Date Time Provider Jacob Larson   6/20/2023  8:40 AM MD SIRIA Barnes AMB   7/27/2023 10:20 AM MD GABE Edwards BS AMB       Requested Prescriptions     Signed Prescriptions Disp Refills    apixaban (ELIQUIS) 5 MG TABS tablet 180 tablet 2     Sig: Take 1 tablet by mouth 2 times daily     Authorizing Provider: Jennifer Quintero     Ordering User: Isis Landaverde

## 2023-06-07 RX ORDER — DILTIAZEM HYDROCHLORIDE 240 MG/1
CAPSULE, COATED, EXTENDED RELEASE ORAL
Qty: 90 CAPSULE | Refills: 1 | Status: SHIPPED | OUTPATIENT
Start: 2023-06-07

## 2023-06-07 NOTE — TELEPHONE ENCOUNTER
Refill per VO of Dr. Lora Fail  Last appt: 1/27/2023    Future Appointments   Date Time Provider Jacob Larson   6/20/2023  8:40 AM MD SIRIA Rahman   7/27/2023 10:20 AM MD GABE Goode BS AMB       Requested Prescriptions     Signed Prescriptions Disp Refills    dilTIAZem (CARDIZEM CD) 240 MG extended release capsule 90 capsule 1     Sig: TAKE 1 CAPSULE BY MOUTH EVERY DAY     Authorizing Provider: Linda Shahid     Ordering User: [facility]

## 2023-06-19 PROBLEM — Z86.711 PERSONAL HISTORY OF PE (PULMONARY EMBOLISM): Status: ACTIVE | Noted: 2023-06-19

## 2023-06-19 NOTE — PROGRESS NOTES
Cardiac Electrophysiology OFFICE Consultation Note     Primary Cardiologist: Dr. Dennis South    Assessment/Plan:   1. PAF (paroxysmal atrial fibrillation) ([facility])  -     EKG 12 Lead  2. High risk medication use  3. Anxiety  4. Normocytic anemia  5. Personal history of PE (pulmonary embolism)       Paroxysmal atrial fibrillation  History of paroxysmal atrial fibrillation longest episode lasting 5 and half hours on recent event monitor. Total burden 5%. History of significant ulcer resulting in ruptured and surgical repair. High risks of long-term bleeding on anticoagulation. Buck vas 2 score of 3. Ultimately I think she would best benefit from rhythm control management and watchman implantation. I think the priority will be watchman first and subsequent ablation therapy in the future. -- The implication regarding the diagnosis of AF was explained to the patient in great detail including the associated risk of CVA, AF-mediated cardiomyopathy, and progression into persistent/chronic AF.  - Literature from the EAST-AFNET 4 Trial demonstrated that early rhythm control management in patients with early diagnosis of atrial fibrillation (median time of diagnosis, 36 days) resulted in reduction in cardiovascular mortality, stroke, or hospitalization with worsening heart failure or ACS. - I have discussed options including continued medical therapy and ablation in detail. I have discussed the risks of left atrial ablation including vascular complications, infection, MI, death, stroke, cardiac tamponade, esophageal fistula, phrenic nerve paralysis, PV stenosis and need for a 2nd procedure with the pt. -The patient has non-valvular AF with a CHADS2 score > 2 or [id number] > 3. The patient is appropriate for short-term treatment with anticoagulation but unsuitable for long-term anticoagulation.  We had a shared-decision making discussion about risk of thrombosis from AF and risk of bleeding from anticoagulation using a

## 2023-06-20 ENCOUNTER — OFFICE VISIT (OUTPATIENT)
Age: 68
End: 2023-06-20
Payer: MEDICARE

## 2023-06-20 VITALS
BODY MASS INDEX: 31.99 KG/M2 | SYSTOLIC BLOOD PRESSURE: 138 MMHG | DIASTOLIC BLOOD PRESSURE: 72 MMHG | RESPIRATION RATE: 18 BRPM | HEART RATE: 90 BPM | OXYGEN SATURATION: 99 % | HEIGHT: 65 IN | WEIGHT: 192 LBS

## 2023-06-20 DIAGNOSIS — Z79.899 HIGH RISK MEDICATION USE: ICD-10-CM

## 2023-06-20 DIAGNOSIS — D64.9 NORMOCYTIC ANEMIA: ICD-10-CM

## 2023-06-20 DIAGNOSIS — F41.9 ANXIETY: ICD-10-CM

## 2023-06-20 DIAGNOSIS — Z86.711 PERSONAL HISTORY OF PE (PULMONARY EMBOLISM): ICD-10-CM

## 2023-06-20 DIAGNOSIS — I48.0 PAF (PAROXYSMAL ATRIAL FIBRILLATION) (HCC): Primary | ICD-10-CM

## 2023-06-20 PROCEDURE — 1123F ACP DISCUSS/DSCN MKR DOCD: CPT | Performed by: INTERNAL MEDICINE

## 2023-06-20 PROCEDURE — G8417 CALC BMI ABV UP PARAM F/U: HCPCS | Performed by: INTERNAL MEDICINE

## 2023-06-20 PROCEDURE — 99204 OFFICE O/P NEW MOD 45 MIN: CPT | Performed by: INTERNAL MEDICINE

## 2023-06-20 PROCEDURE — G8400 PT W/DXA NO RESULTS DOC: HCPCS | Performed by: INTERNAL MEDICINE

## 2023-06-20 PROCEDURE — 93005 ELECTROCARDIOGRAM TRACING: CPT | Performed by: INTERNAL MEDICINE

## 2023-06-20 PROCEDURE — G8427 DOCREV CUR MEDS BY ELIG CLIN: HCPCS | Performed by: INTERNAL MEDICINE

## 2023-06-20 PROCEDURE — 1036F TOBACCO NON-USER: CPT | Performed by: INTERNAL MEDICINE

## 2023-06-20 PROCEDURE — 93010 ELECTROCARDIOGRAM REPORT: CPT | Performed by: INTERNAL MEDICINE

## 2023-06-20 PROCEDURE — 3017F COLORECTAL CA SCREEN DOC REV: CPT | Performed by: INTERNAL MEDICINE

## 2023-06-20 PROCEDURE — 1090F PRES/ABSN URINE INCON ASSESS: CPT | Performed by: INTERNAL MEDICINE

## 2023-06-20 RX ORDER — ROSUVASTATIN CALCIUM 5 MG/1
TABLET, COATED ORAL NIGHTLY
Qty: 90 TABLET | Refills: 1 | Status: SHIPPED | OUTPATIENT
Start: 2023-06-20

## 2023-06-20 RX ORDER — AMOXICILLIN 500 MG/1
TABLET, FILM COATED ORAL
COMMUNITY
Start: 2023-06-08

## 2023-06-20 ASSESSMENT — PATIENT HEALTH QUESTIONNAIRE - PHQ9
SUM OF ALL RESPONSES TO PHQ QUESTIONS 1-9: 0
SUM OF ALL RESPONSES TO PHQ QUESTIONS 1-9: 0
1. LITTLE INTEREST OR PLEASURE IN DOING THINGS: 0
2. FEELING DOWN, DEPRESSED OR HOPELESS: 0
SUM OF ALL RESPONSES TO PHQ QUESTIONS 1-9: 0
SUM OF ALL RESPONSES TO PHQ QUESTIONS 1-9: 0
SUM OF ALL RESPONSES TO PHQ9 QUESTIONS 1 & 2: 0

## 2023-06-20 NOTE — TELEPHONE ENCOUNTER
Refill per VO of Dr. Cookie Romberg  Last appt: 1/27/2023    Future Appointments   Date Time Provider Jacob Larson   7/27/2023 10:20 AM MD JESSICA AnnF BS AMB   12/15/2023 10:00 AM An MD GABE Vogt BS AMB       Requested Prescriptions     Signed Prescriptions Disp Refills    rosuvastatin (CRESTOR) 5 MG tablet 90 tablet 1     Sig: TAKE 1 TABLET BY MOUTH NIGHTLY     Authorizing Provider: Brooks Soriano     Ordering User: Zeb Sanchez

## 2023-06-20 NOTE — TELEPHONE ENCOUNTER
Pt needs a refill for rosuvastatin 5mg, pt will be going out of town and needs this medication.     [facility] # [phone]

## 2023-06-20 NOTE — PATIENT INSTRUCTIONS
Please call us if you want to proceed with Watchman implantation and subsequently ablation therapy  FU with Dr. Shanell Caballero in 6 months

## 2023-09-18 ENCOUNTER — OFFICE VISIT (OUTPATIENT)
Age: 68
End: 2023-09-18
Payer: MEDICARE

## 2023-09-18 VITALS
HEIGHT: 65 IN | SYSTOLIC BLOOD PRESSURE: 134 MMHG | DIASTOLIC BLOOD PRESSURE: 60 MMHG | BODY MASS INDEX: 32.65 KG/M2 | HEART RATE: 99 BPM | WEIGHT: 196 LBS | OXYGEN SATURATION: 98 %

## 2023-09-18 DIAGNOSIS — I48.0 PAF (PAROXYSMAL ATRIAL FIBRILLATION) (HCC): Primary | ICD-10-CM

## 2023-09-18 DIAGNOSIS — E07.9 THYROID MASS: ICD-10-CM

## 2023-09-18 DIAGNOSIS — K25.9 GASTRIC ULCER, UNSPECIFIED CHRONICITY, UNSPECIFIED WHETHER GASTRIC ULCER HEMORRHAGE OR PERFORATION PRESENT: ICD-10-CM

## 2023-09-18 PROCEDURE — 99214 OFFICE O/P EST MOD 30 MIN: CPT | Performed by: SPECIALIST

## 2023-09-18 PROCEDURE — 1090F PRES/ABSN URINE INCON ASSESS: CPT | Performed by: SPECIALIST

## 2023-09-18 PROCEDURE — G8417 CALC BMI ABV UP PARAM F/U: HCPCS | Performed by: SPECIALIST

## 2023-09-18 PROCEDURE — G8427 DOCREV CUR MEDS BY ELIG CLIN: HCPCS | Performed by: SPECIALIST

## 2023-09-18 PROCEDURE — G8400 PT W/DXA NO RESULTS DOC: HCPCS | Performed by: SPECIALIST

## 2023-09-18 PROCEDURE — 1036F TOBACCO NON-USER: CPT | Performed by: SPECIALIST

## 2023-09-18 PROCEDURE — 1123F ACP DISCUSS/DSCN MKR DOCD: CPT | Performed by: SPECIALIST

## 2023-09-18 PROCEDURE — 3017F COLORECTAL CA SCREEN DOC REV: CPT | Performed by: SPECIALIST

## 2023-09-18 NOTE — PROGRESS NOTES
Room # 4  Chief Complaint   Patient presents with    Follow-up     6 month    Atrial Fibrillation    Other     Hx Pulm Embolus     Vitals:    09/18/23 1021   BP: 134/60   Site: Left Upper Arm   Position: Sitting   Cuff Size: Large Adult   Pulse: 99   SpO2: 98%   Weight: 196 lb (88.9 kg)   Height: 5' 5\" (1.651 m)     Chest pain denied   SOB denied   Palpitations denied   Swelling in hands/feet denied   Dizziness denied   Recent hospital stays denied   Refills denied     Dr. Kem Betts Thyroid surgery November 9th. Asking about holding [address] .
with RVR, NSST changes   EKG 4/19/22 - sinus tach, NSST changes    EKG 5/24/22 - Afib with RVR   EKG 5/25/22 - NSR   EKG 6/30/23 - NSR, PRWP       CTA chest 2/7/22 - findings include -  Mild coronary artery calcification. CTA chest 2/10/22 -  1. Right upper and lower lobe pulmonary emboli. 2. Slight increase in size of rim-enhancing complex collection in the cul-de-sac of the pelvis concerning for abscess. 3. Gas and fluid containing collection in the soft tissues anterior  to the right hip concerning for abscess. 4. Increased left greater than right pleural effusions. Echo 2/14/22 - TDS. LVEF 70-75%       Event Monitor 5/1/22 - wore it 27 days. Primary NSR, Avg HR 82. Afib occurred 46 times with HR , Avg 106 bpm - Afib burden 5%      ASSESSMENT AND PLAN:     Assessment and Plan:    She had a prolonged hospitalization after hip replacement 1/31/22 - p/w abd pain to ED 2/4/22 and found to have a perforated gastric ulcer and then later aspiration PNA, acute PE and afib with RVR. Regarding Afib, she spont converted to NSR before discharge. 1) PAF   - initially discovered 2/22   - Her  [id number] score is 3 (HTN, female and age)    - Event Monitor 5/1/22 - wore it 27 days. Primary NSR, Avg HR 82. Afib occurred 46 times with HR , Avg 106 bpm - Afib burden 5%   - Her Afib has been asymptomatic.    ---> will continue cardizem for rate control    ---> Continue OAC (eliquis)    ---> She george Dr. Sheri Landau --> Recommended eventual Watchman and Afib ablation -- she is thinking about        2) Mild atherosclerosis on CT    - CTA chest 2/7/22 - findings include -  Mild coronary artery calcification.    - recommend statin for primary prevention       3) Dyslipidemia    - LDL was 113 in 5/2021    - cont crestor    - FU labs were better per patient       4) Thyroid nodule / mass    - seeing Endocrine   - She can proceed with surgery and should have low cardiac risk   - she can hold eliquis 2-3 days before

## 2023-10-16 RX ORDER — DILTIAZEM HYDROCHLORIDE 240 MG/1
CAPSULE, COATED, EXTENDED RELEASE ORAL
Qty: 90 CAPSULE | Refills: 3 | Status: SHIPPED | OUTPATIENT
Start: 2023-10-16

## 2023-10-16 NOTE — TELEPHONE ENCOUNTER
Refill per VO of Dr. Lisette Dunbar  Last appt: 9/18/2023    Future Appointments   Date Time Provider [address]   10/24/2023  9:00 AM [facility] NP ROOM 1 [facility] OR Pre As   12/15/2023 10:00 AM Kimberly Neal MD [facility] BS AMB   9/20/2024 10:00 AM MD GABE Crowley BS AMB       Requested Prescriptions     Signed Prescriptions Disp Refills    dilTIAZem (CARDIZEM CD) 240 MG extended release capsule 90 capsule 3     Sig: TAKE 1 CAPSULE BY MOUTH EVERY DAY     Authorizing Provider: Arturo Loyola     Ordering User: Gurmeet Ribera

## 2023-10-24 ENCOUNTER — HOSPITAL ENCOUNTER (OUTPATIENT)
Facility: HOSPITAL | Age: 68
Discharge: HOME | End: 2023-10-27
Payer: MEDICARE

## 2023-10-24 VITALS
RESPIRATION RATE: 15 BRPM | OXYGEN SATURATION: 98 % | WEIGHT: 196 LBS | SYSTOLIC BLOOD PRESSURE: 141 MMHG | DIASTOLIC BLOOD PRESSURE: 65 MMHG | TEMPERATURE: 97 F | HEART RATE: 105 BPM | BODY MASS INDEX: 32.65 KG/M2 | HEIGHT: 65 IN

## 2023-10-24 PROBLEM — E04.1 THYROID NODULE: Status: ACTIVE | Noted: 2023-10-24

## 2023-10-24 LAB
ABO + RH BLD: NORMAL
ANION GAP SERPL CALC-SCNC: 6 MMOL/L (ref 5–15)
BASOPHILS # BLD: 0 K/UL (ref 0–0.1)
BASOPHILS NFR BLD: 0 % (ref 0–1)
BLOOD GROUP ANTIBODIES SERPL: NORMAL
BUN SERPL-MCNC: 19 MG/DL (ref 6–20)
BUN/CREAT SERPL: 27 (ref 12–20)
CALCIUM SERPL-MCNC: 9.3 MG/DL (ref 8.5–10.1)
CHLORIDE SERPL-SCNC: 106 MMOL/L (ref 97–108)
CO2 SERPL-SCNC: 25 MMOL/L (ref 21–32)
CREAT SERPL-MCNC: 0.7 MG/DL (ref 0.55–1.02)
DIFFERENTIAL METHOD BLD: ABNORMAL
EKG ATRIAL RATE: 94 BPM
EKG DIAGNOSIS: NORMAL
EKG P AXIS: 67 DEGREES
EKG P-R INTERVAL: 164 MS
EKG Q-T INTERVAL: 342 MS
EKG QRS DURATION: 82 MS
EKG QTC CALCULATION (BAZETT): 427 MS
EKG R AXIS: 22 DEGREES
EKG T AXIS: 48 DEGREES
EKG VENTRICULAR RATE: 94 BPM
EOSINOPHIL # BLD: 0 K/UL (ref 0–0.4)
EOSINOPHIL NFR BLD: 0 % (ref 0–7)
ERYTHROCYTE [DISTWIDTH] IN BLOOD BY AUTOMATED COUNT: 14.7 % (ref 11.5–14.5)
GLUCOSE SERPL-MCNC: 100 MG/DL (ref 65–100)
HCT VFR BLD AUTO: 36.4 % (ref 35–47)
HGB BLD-MCNC: 11.6 G/DL (ref 11.5–16)
IMM GRANULOCYTES # BLD AUTO: 0 K/UL (ref 0–0.04)
IMM GRANULOCYTES NFR BLD AUTO: 0 % (ref 0–0.5)
LYMPHOCYTES # BLD: 1.3 K/UL (ref 0.8–3.5)
LYMPHOCYTES NFR BLD: 14 % (ref 12–49)
MCH RBC QN AUTO: 27.5 PG (ref 26–34)
MCHC RBC AUTO-ENTMCNC: 31.9 G/DL (ref 30–36.5)
MCV RBC AUTO: 86.3 FL (ref 80–99)
MONOCYTES # BLD: 0.5 K/UL (ref 0–1)
MONOCYTES NFR BLD: 5 % (ref 5–13)
NEUTS SEG # BLD: 7.8 K/UL (ref 1.8–8)
NEUTS SEG NFR BLD: 81 % (ref 32–75)
NRBC # BLD: 0 K/UL (ref 0–0.01)
NRBC BLD-RTO: 0 PER 100 WBC
PLATELET # BLD AUTO: 516 K/UL (ref 150–400)
PMV BLD AUTO: 10 FL (ref 8.9–12.9)
POTASSIUM SERPL-SCNC: 4.4 MMOL/L (ref 3.5–5.1)
RBC # BLD AUTO: 4.22 M/UL (ref 3.8–5.2)
SODIUM SERPL-SCNC: 137 MMOL/L (ref 136–145)
SPECIMEN EXP DATE BLD: NORMAL
WBC # BLD AUTO: 9.7 K/UL (ref 3.6–11)

## 2023-10-24 PROCEDURE — 93010 ELECTROCARDIOGRAM REPORT: CPT | Performed by: INTERNAL MEDICINE

## 2023-10-24 PROCEDURE — 93005 ELECTROCARDIOGRAM TRACING: CPT | Performed by: NURSE PRACTITIONER

## 2023-10-24 PROCEDURE — 85025 COMPLETE CBC W/AUTO DIFF WBC: CPT

## 2023-10-24 PROCEDURE — APPNB30 APP NON BILLABLE TIME 0-30 MINS: Performed by: NURSE PRACTITIONER

## 2023-10-24 PROCEDURE — 86901 BLOOD TYPING SEROLOGIC RH(D): CPT

## 2023-10-24 PROCEDURE — 86900 BLOOD TYPING SEROLOGIC ABO: CPT

## 2023-10-24 PROCEDURE — 86850 RBC ANTIBODY SCREEN: CPT

## 2023-10-24 PROCEDURE — 80048 BASIC METABOLIC PNL TOTAL CA: CPT

## 2023-10-24 PROCEDURE — 36415 COLL VENOUS BLD VENIPUNCTURE: CPT

## 2023-10-24 RX ORDER — ROSUVASTATIN CALCIUM 10 MG/1
10 TABLET, COATED ORAL
COMMUNITY

## 2023-10-24 ASSESSMENT — ENCOUNTER SYMPTOMS
COUGH: 0
TROUBLE SWALLOWING: 0
SORE THROAT: 0
BLOOD IN STOOL: 0
SHORTNESS OF BREATH: 0
NAUSEA: 0
VOMITING: 0
ABDOMINAL PAIN: 0
WHEEZING: 0

## 2023-10-24 NOTE — H&P
nervous/anxious. Physical Exam  Vitals and nursing note reviewed. Exam conducted with a chaperone present. Constitutional:       General: She is not in acute distress. Appearance: Normal appearance. HENT:      Head: Normocephalic and atraumatic. Right Ear: External ear normal.      Left Ear: External ear normal.      Nose: Nose normal.      Mouth/Throat:      Mouth: Mucous membranes are moist.      Pharynx: Oropharynx is clear. Eyes:      Extraocular Movements: Extraocular movements intact. Cardiovascular:      Rate and Rhythm: Normal rate and regular rhythm. Pulses: Normal pulses. Heart sounds: Normal heart sounds. Pulmonary:      Effort: Pulmonary effort is normal.      Breath sounds: Normal breath sounds. Abdominal:      General: Bowel sounds are normal.      Palpations: Abdomen is soft. Tenderness: There is no abdominal tenderness. Musculoskeletal:         General: Normal range of motion. Cervical back: Normal range of motion and neck supple. Skin:     General: Skin is warm and dry. Findings: No rash. Neurological:      General: No focal deficit present. Mental Status: She is alert and oriented to person, place, and time. Psychiatric:         Mood and Affect: Mood normal.         Behavior: Behavior normal.         Assessment  Thyroid mass  Preoperative evaluation  Atrial fibrillation  History of PE after surgery    Plan  Labs and EKG pending  Plan for Left Thyroid Lobectomy  Cardiac clearance with instructions to hold Eliquis for 3 days in chart. The purpose of this visit is for preoperative history and physical and does not imply medical clearance for surgical procedure. Additional clearance from specialists may be required based on findings from this examination.     According to the 2014 ACC/AHA pre-operative risk assessment guidelines Brittany Hall is at low risk for major cardiac complications during a intermediate procedure,

## 2023-10-27 ENCOUNTER — TELEPHONE (OUTPATIENT)
Age: 68
End: 2023-10-27

## 2023-10-27 NOTE — TELEPHONE ENCOUNTER
Fax received from [facility] for clearance.     Procedure: L Thyroid Lobectomy under GA on 11/9/23  Dr: Abdullahi Cox  Medication requested to hold: Eliquis x3 days prior    Fax to: [phone]

## 2023-11-07 ENCOUNTER — ANESTHESIA EVENT (OUTPATIENT)
Facility: HOSPITAL | Age: 68
End: 2023-11-07
Payer: MEDICARE

## 2023-11-08 NOTE — PERIOP NOTE
Hello,     You are scheduled to have surgery tomorrow at [facility]. We would like for you to arrive at  0820 am  We are located on the second floor, [address]. You will check-in at the registration desk located outside the elevators on the second floor prior to proceeding to [address]. Remember nothing to eat or drink after midnight. If you need to take medications the morning of surgery, please take with a few sips of water. Wear loose, comfortable clothing and leave all your jewelry at home. You may bring your cell phone with you. One family member will be allowed in the pre-op area once you are dressed and your IV has been started. You will need someone to drive you home and be with you for 24 hours post-anesthesia. We look forward to seeing you! Call [phone] for questions after hours and [phone] between 5:30AM and 6PM.     Thanks!     [facility] ASU PREOP TEAM

## 2023-11-09 ENCOUNTER — ANESTHESIA (OUTPATIENT)
Facility: HOSPITAL | Age: 68
End: 2023-11-09
Payer: MEDICARE

## 2023-11-09 ENCOUNTER — HOSPITAL ENCOUNTER (OUTPATIENT)
Facility: HOSPITAL | Age: 68
Setting detail: OUTPATIENT SURGERY
Discharge: HOME | End: 2023-11-09
Attending: SPECIALIST | Admitting: SPECIALIST
Payer: MEDICARE

## 2023-11-09 VITALS
HEIGHT: 65 IN | BODY MASS INDEX: 32.14 KG/M2 | RESPIRATION RATE: 20 BRPM | HEART RATE: 99 BPM | DIASTOLIC BLOOD PRESSURE: 63 MMHG | TEMPERATURE: 98.4 F | WEIGHT: 192.9 LBS | SYSTOLIC BLOOD PRESSURE: 136 MMHG | OXYGEN SATURATION: 94 %

## 2023-11-09 DIAGNOSIS — E04.1 THYROID NODULE: Primary | ICD-10-CM

## 2023-11-09 LAB
ABO + RH BLD: NORMAL
BLOOD GROUP ANTIBODIES SERPL: NORMAL
SPECIMEN EXP DATE BLD: NORMAL

## 2023-11-09 PROCEDURE — 2709999900 HC NON-CHARGEABLE SUPPLY: Performed by: SPECIALIST

## 2023-11-09 PROCEDURE — 88311 DECALCIFY TISSUE: CPT

## 2023-11-09 PROCEDURE — 36415 COLL VENOUS BLD VENIPUNCTURE: CPT

## 2023-11-09 PROCEDURE — 2580000003 HC RX 258: Performed by: ANESTHESIOLOGY

## 2023-11-09 PROCEDURE — 3600000014 HC SURGERY LEVEL 4 ADDTL 15MIN: Performed by: SPECIALIST

## 2023-11-09 PROCEDURE — 7100000000 HC PACU RECOVERY - FIRST 15 MIN: Performed by: SPECIALIST

## 2023-11-09 PROCEDURE — 3700000000 HC ANESTHESIA ATTENDED CARE: Performed by: SPECIALIST

## 2023-11-09 PROCEDURE — 7100000011 HC PHASE II RECOVERY - ADDTL 15 MIN: Performed by: SPECIALIST

## 2023-11-09 PROCEDURE — 86850 RBC ANTIBODY SCREEN: CPT

## 2023-11-09 PROCEDURE — 88307 TISSUE EXAM BY PATHOLOGIST: CPT

## 2023-11-09 PROCEDURE — 3600000004 HC SURGERY LEVEL 4 BASE: Performed by: SPECIALIST

## 2023-11-09 PROCEDURE — 7100000010 HC PHASE II RECOVERY - FIRST 15 MIN: Performed by: SPECIALIST

## 2023-11-09 PROCEDURE — 2500000003 HC RX 250 WO HCPCS: Performed by: SPECIALIST

## 2023-11-09 PROCEDURE — 86901 BLOOD TYPING SEROLOGIC RH(D): CPT

## 2023-11-09 PROCEDURE — 2500000003 HC RX 250 WO HCPCS: Performed by: ANESTHESIOLOGY

## 2023-11-09 PROCEDURE — 86900 BLOOD TYPING SEROLOGIC ABO: CPT

## 2023-11-09 PROCEDURE — 3700000001 HC ADD 15 MINUTES (ANESTHESIA): Performed by: SPECIALIST

## 2023-11-09 PROCEDURE — 6360000002 HC RX W HCPCS: Performed by: ANESTHESIOLOGY

## 2023-11-09 PROCEDURE — 2720000010 HC SURG SUPPLY STERILE: Performed by: SPECIALIST

## 2023-11-09 RX ORDER — LIDOCAINE HYDROCHLORIDE 20 MG/ML
INJECTION, SOLUTION EPIDURAL; INFILTRATION; INTRACAUDAL; PERINEURAL PRN
Status: DISCONTINUED | OUTPATIENT
Start: 2023-11-09 | End: 2023-11-09 | Stop reason: SDUPTHER

## 2023-11-09 RX ORDER — PROPOFOL 10 MG/ML
INJECTION, EMULSION INTRAVENOUS CONTINUOUS PRN
Status: DISCONTINUED | OUTPATIENT
Start: 2023-11-09 | End: 2023-11-09 | Stop reason: SDUPTHER

## 2023-11-09 RX ORDER — SODIUM CHLORIDE, SODIUM LACTATE, POTASSIUM CHLORIDE, CALCIUM CHLORIDE 600; 310; 30; 20 MG/100ML; MG/100ML; MG/100ML; MG/100ML
INJECTION, SOLUTION INTRAVENOUS CONTINUOUS PRN
Status: DISCONTINUED | OUTPATIENT
Start: 2023-11-09 | End: 2023-11-09 | Stop reason: SDUPTHER

## 2023-11-09 RX ORDER — DIPHENHYDRAMINE HYDROCHLORIDE 50 MG/ML
12.5 INJECTION INTRAMUSCULAR; INTRAVENOUS
Status: DISCONTINUED | OUTPATIENT
Start: 2023-11-09 | End: 2023-11-09 | Stop reason: HOSPADM

## 2023-11-09 RX ORDER — FENTANYL CITRATE 50 UG/ML
100 INJECTION, SOLUTION INTRAMUSCULAR; INTRAVENOUS
Status: DISCONTINUED | OUTPATIENT
Start: 2023-11-09 | End: 2023-11-09 | Stop reason: HOSPADM

## 2023-11-09 RX ORDER — SODIUM CHLORIDE, SODIUM LACTATE, POTASSIUM CHLORIDE, CALCIUM CHLORIDE 600; 310; 30; 20 MG/100ML; MG/100ML; MG/100ML; MG/100ML
INJECTION, SOLUTION INTRAVENOUS CONTINUOUS
Status: DISCONTINUED | OUTPATIENT
Start: 2023-11-09 | End: 2023-11-09 | Stop reason: HOSPADM

## 2023-11-09 RX ORDER — BUPIVACAINE HYDROCHLORIDE AND EPINEPHRINE 5; .0091 MG/ML; MG/ML
INJECTION, SOLUTION DENTAL; INFILTRATION PRN
Status: DISCONTINUED | OUTPATIENT
Start: 2023-11-09 | End: 2023-11-09 | Stop reason: HOSPADM

## 2023-11-09 RX ORDER — REMIFENTANIL HYDROCHLORIDE 1 MG/ML
INJECTION, POWDER, LYOPHILIZED, FOR SOLUTION INTRAVENOUS CONTINUOUS PRN
Status: DISCONTINUED | OUTPATIENT
Start: 2023-11-09 | End: 2023-11-09 | Stop reason: SDUPTHER

## 2023-11-09 RX ORDER — SUCCINYLCHOLINE CHLORIDE 20 MG/ML
INJECTION INTRAMUSCULAR; INTRAVENOUS PRN
Status: DISCONTINUED | OUTPATIENT
Start: 2023-11-09 | End: 2023-11-09 | Stop reason: SDUPTHER

## 2023-11-09 RX ORDER — DEXAMETHASONE SODIUM PHOSPHATE 4 MG/ML
INJECTION, SOLUTION INTRA-ARTICULAR; INTRALESIONAL; INTRAMUSCULAR; INTRAVENOUS; SOFT TISSUE PRN
Status: DISCONTINUED | OUTPATIENT
Start: 2023-11-09 | End: 2023-11-09 | Stop reason: SDUPTHER

## 2023-11-09 RX ORDER — ONDANSETRON 2 MG/ML
4 INJECTION INTRAMUSCULAR; INTRAVENOUS
Status: DISCONTINUED | OUTPATIENT
Start: 2023-11-09 | End: 2023-11-09 | Stop reason: HOSPADM

## 2023-11-09 RX ORDER — MIDAZOLAM HYDROCHLORIDE 2 MG/2ML
2 INJECTION, SOLUTION INTRAMUSCULAR; INTRAVENOUS
Status: DISCONTINUED | OUTPATIENT
Start: 2023-11-09 | End: 2023-11-09 | Stop reason: HOSPADM

## 2023-11-09 RX ORDER — HYDROCODONE BITARTRATE AND ACETAMINOPHEN 5; 325 MG/1; MG/1
1 TABLET ORAL EVERY 4 HOURS PRN
Qty: 14 TABLET | Refills: 0 | Status: SHIPPED | OUTPATIENT
Start: 2023-11-09 | End: 2023-11-14

## 2023-11-09 RX ORDER — LIDOCAINE HYDROCHLORIDE 10 MG/ML
1 INJECTION, SOLUTION EPIDURAL; INFILTRATION; INTRACAUDAL; PERINEURAL
Status: DISCONTINUED | OUTPATIENT
Start: 2023-11-09 | End: 2023-11-09 | Stop reason: HOSPADM

## 2023-11-09 RX ORDER — ONDANSETRON 2 MG/ML
INJECTION INTRAMUSCULAR; INTRAVENOUS PRN
Status: DISCONTINUED | OUTPATIENT
Start: 2023-11-09 | End: 2023-11-09 | Stop reason: SDUPTHER

## 2023-11-09 RX ADMIN — SUCCINYLCHOLINE CHLORIDE 100 MG: 20 INJECTION, SOLUTION INTRAMUSCULAR; INTRAVENOUS at 10:35

## 2023-11-09 RX ADMIN — PHENYLEPHRINE HYDROCHLORIDE 20 MCG: 10 INJECTION INTRAVENOUS at 12:17

## 2023-11-09 RX ADMIN — SODIUM CHLORIDE, POTASSIUM CHLORIDE, SODIUM LACTATE AND CALCIUM CHLORIDE: 600; 310; 30; 20 INJECTION, SOLUTION INTRAVENOUS at 09:28

## 2023-11-09 RX ADMIN — ONDANSETRON HYDROCHLORIDE 4 MG: 2 SOLUTION INTRAMUSCULAR; INTRAVENOUS at 10:42

## 2023-11-09 RX ADMIN — PHENYLEPHRINE HYDROCHLORIDE 40 MCG: 10 INJECTION INTRAVENOUS at 11:09

## 2023-11-09 RX ADMIN — LIDOCAINE HYDROCHLORIDE 40 MG: 20 INJECTION, SOLUTION EPIDURAL; INFILTRATION; INTRACAUDAL; PERINEURAL at 10:35

## 2023-11-09 RX ADMIN — PHENYLEPHRINE HYDROCHLORIDE 20 MCG: 10 INJECTION INTRAVENOUS at 11:57

## 2023-11-09 RX ADMIN — DEXAMETHASONE SODIUM PHOSPHATE 4 MG: 4 INJECTION, SOLUTION INTRAMUSCULAR; INTRAVENOUS at 10:42

## 2023-11-09 RX ADMIN — PROPOFOL 100 MCG/KG/MIN: 10 INJECTION, EMULSION INTRAVENOUS at 10:40

## 2023-11-09 RX ADMIN — PHENYLEPHRINE HYDROCHLORIDE 40 MCG: 10 INJECTION INTRAVENOUS at 11:02

## 2023-11-09 RX ADMIN — PHENYLEPHRINE HYDROCHLORIDE 20 MCG: 10 INJECTION INTRAVENOUS at 11:46

## 2023-11-09 RX ADMIN — PHENYLEPHRINE HYDROCHLORIDE 40 MCG: 10 INJECTION INTRAVENOUS at 10:54

## 2023-11-09 RX ADMIN — REMIFENTANIL HYDROCHLORIDE 0.1 MCG/KG/MIN: 1 INJECTION, POWDER, LYOPHILIZED, FOR SOLUTION INTRAVENOUS at 10:40

## 2023-11-09 RX ADMIN — SODIUM CHLORIDE, POTASSIUM CHLORIDE, SODIUM LACTATE AND CALCIUM CHLORIDE: 600; 310; 30; 20 INJECTION, SOLUTION INTRAVENOUS at 09:49

## 2023-11-09 RX ADMIN — PROPOFOL 100 MG: 10 INJECTION, EMULSION INTRAVENOUS at 10:35

## 2023-11-09 ASSESSMENT — PAIN SCALES - GENERAL
PAINLEVEL_OUTOF10: 0

## 2023-11-09 ASSESSMENT — PAIN - FUNCTIONAL ASSESSMENT
PAIN_FUNCTIONAL_ASSESSMENT: 0-10
PAIN_FUNCTIONAL_ASSESSMENT: ACTIVITIES ARE NOT PREVENTED

## 2023-11-09 NOTE — H&P
Update History & Physical    The patient's History and Physical of October 24, 2023 was reviewed with the patient and I examined the patient. There was no change. The surgical site was confirmed by the patient and me. Plan: The risks, benefits, expected outcome, and alternative to the recommended procedure have been discussed with the patient. Patient understands and wants to proceed with the procedure.      Electronically signed by Perez Wills MD on 11/9/2023 at 10:13 AM

## 2023-11-09 NOTE — OP NOTE
OPERATIVE REPORT        Patient: Nieves Alanis  MRN: [id number]    Date of Procedure:  11/9/2023    Preoperative Diagnosis: Left thyroid nodule. Postoperative Diagnosis:  Same, substernal nodule. Procedure: Thyroid lobectomy, substernal (transcervical approach). Surgeon: Michael Hernandez MD    Assistant: Javy Harrison    Anesthesia: General endotracheal with Medtronic NIM EMG endotracheal tube. Estimated Blood Loss:   20 cc    Indications: Found to have a large left thyroid nodule with inderterminate FNA cytology. .     Findings:  Enlarged left thyroid lobe with rubbery nodule extending below clavicle. Implants: None. Drains:  None. Specimens: Left thyroid lobe and isthmus. Procedure in Detail:   The patient was seen in the preoperative area. The risks, benefits, complications and expected outcomes were discussed with the patient. The patient concurred with the proposed plan, giving informed consent. The site of surgery properly noted/marked. The patient was taken to operating room, identified as Nieves Alanis and the procedure verified as thyroidectomy. A time out was held and the above information confirmed. The patient was placed supine position on the operating table. General anesthesia was induced and the patient was intubated with a Medtronic NIM EMG endotracheal tube. Proper positioning of the tube was confirmed. The electrodes were connected to the NIM unit for intraoperative neuromonitoring of the laryngeal nerve complex. The neck was extended and prepped and draped in standard fashion. The incision was marked and subcutaneously infiltrated with 0.5% bupivacaine with epinephrine. A transverse cervical incision was created above the sternal notch within a natural skin fold. The platysma muscle was divided and superior and inferior sub-platysmal flaps were elevated.   The strap muscles

## 2023-11-09 NOTE — PERIOP NOTE
POST ANESTHESIA CARE    DISCHARGE / TRANSFER NOTE  Brittany Penn was:    [x] discharged        via   [x] Wheelchair          to [x] Private Vehicle     [] transferred    [] Carried    [] Taxi / Vehicle \"for Hire\"  [] Walk out   [] Ambulance / Medical Transportation   [] Stretcher   [] Hospital room _**_           [] Bed      Patient was escorted by:      [x] Nurse   [] Volunteer  [] Transporter / Technician  [] Parent      [] Spouse / Family /      Patient verbalized     [x] appreciation and was very pleased with care received   [] frustration with care received       throughout their stay. Patient was discharged in     [x] pleasant mood  [] sad mood  [] mad mood . Pain at discharge/transfer was      0  /10. Discharge, medication and follow-up instructions were verbalized as understood prior to discharge  (if applicable for same-day procedures being discharged.)    All personal belongings have been returned to patient, and patient/family verbally confirm receiving belongings as all present.

## 2023-11-09 NOTE — ANESTHESIA POSTPROCEDURE EVALUATION
Department of Anesthesiology  Postprocedure Note    Patient: Evelyn Stark  MRN: [id number]  YOB: 1955  Date of evaluation: 11/9/2023      Procedure Summary     Date: 11/09/23 Room / Location: [facility] ASU OR  / [facility] AMBULATORY OR    Anesthesia Start: 4135 Anesthesia Stop: 2809    Procedure: LEFT THYROID LOBECTOMY (GENERAL ET) (Left) Diagnosis:       Thyroid nodule      (Thyroid nodule [E04.1])    Surgeons: Perez Wills MD Responsible Provider: Magda Graham MD    Anesthesia Type: general ASA Status: 2          Anesthesia Type: No value filed. Miguel A Phase I: Miguel A Score: 10    Miguel A Phase II: Miguel A Score: 10      Anesthesia Post Evaluation    Patient location during evaluation: PACU  Patient participation: complete - patient participated  Level of consciousness: awake and alert  Pain score: 2  Airway patency: patent  Nausea & Vomiting: no vomiting and no nausea  Complications: no  Cardiovascular status: hemodynamically stable  Respiratory status: room air  Hydration status: stable  There was medical reason for not using a multimodal analgesia pain management approach. Pain management: adequate

## 2023-11-09 NOTE — PERIOP NOTE
PACU-IN REPORT FROM ANESTHESIA    Verbal report received from   Donny Craft   [] MD/DO-Anesthesiologist    [x] CRNA   [] with student    CHOICE ANESTHESIA:  [x] GENERAL  [x] TIVA  [] MAC  [x] LOCAL  [] REGIONAL  [] SPINAL   [] EPIDURAL   **Note the anesthesia record for medications given intraoperatively. **           [] E.R.A.S. PROTOCOL    SURGICAL PROCEDURE: Procedure(s) (LRB):  LEFT THYROID LOBECTOMY (GENERAL ET) (Left)     SURGEON: Perez Wills MD.    Brief Initial Visual Assessment:    Patient Age: [] Infant(1-12mo)       []Pediatric(1-13yrs)    [] Adolescent(13-18yrs)     [] Adult(18-65yrs)      [x]Geriatric Adult(>65yrs). Patient    [x] Alert           [x]Calm & Cooperative      [] Anxious/Restless      [] Combative  Appearance:  [x] Drowsy      [] Confused/Disoriented     [] Sedated      [] Unresponsive     Oriented x  3            Airway:     [x] Patent          [] \"Difficult Airway\" report by Anesthesia                        [] Obstructs easily/Obstructed on arrival          [] Manual stimulation and/or airway assistance necessary                         [] Airway improved with head/airway repositioning                       Airway Adjuncts Present: [] Oral Airway    [] Nasal Trumpet    [] ETT    [] LMA            Respiratory  [x] Even   [] Labored   [] Shallow   [] Tachypnea (>28 RR/min)  [] Bradypnea (<10 RR/min)  Pattern:    [x] Non-Labored  [] VENT and/or respiratory assistance     being provided. Skin:     [x] Pink [] Dusky    [] Pale         [x] Warm     [] Hot [] Cool       [] Cold    [x]Dry     [] Moist [] Diaphoretic     Membranes:  [x] Pink    [] Pale        [x] Moist [] Dry     [] Crusty     Pain:   [x] No Acute Discomfort. 0  /10 Scale [x] Verbal Numeric / Visual   [] Moderate Discomfort.      [] V.A.S. [] Acute Discomfort.                 [] A.N.V.    [] Chronic-Issue Related Discomfort.   [] F.L.A.C.C. Note E-MAR for medications administered.   []Faces,

## 2023-11-09 NOTE — ANESTHESIA POSTPROCEDURE EVALUATION
Department of Anesthesiology  Postprocedure Note    Patient: Cristóbal Suarez  MRN: [id number]  YOB: 1955  Date of evaluation: 11/9/2023      Procedure Summary     Date: 11/09/23 Room / Location: [facility] ASU OR  / [facility] AMBULATORY OR    Anesthesia Start: 6283 Anesthesia Stop: 7122    Procedure: LEFT THYROID LOBECTOMY (GENERAL ET) (Left) Diagnosis:       Thyroid nodule      (Thyroid nodule [E04.1])    Surgeons: Fredi Swain MD Responsible Provider: Farhad Ribera MD    Anesthesia Type: general ASA Status: 2          Anesthesia Type: No value filed.     Miguel A Phase I: Miguel A Score: 10    Miguel A Phase II: Miguel A Score: 10      Anesthesia Post Evaluation    Patient location during evaluation: PACU  Patient participation: complete - patient participated  Level of consciousness: awake and alert  Pain score: 1  Airway patency: patent  Nausea & Vomiting: no nausea and no vomiting  Complications: no  Cardiovascular status: hemodynamically stable  Respiratory status: room air  Hydration status: stable  Multimodal analgesia pain management approach  Pain management: adequate

## 2023-12-05 ENCOUNTER — TRANSCRIBE ORDERS (OUTPATIENT)
Facility: HOSPITAL | Age: 68
End: 2023-12-05

## 2023-12-05 DIAGNOSIS — R74.8 ELEVATED ALKALINE PHOSPHATASE LEVEL: ICD-10-CM

## 2023-12-05 DIAGNOSIS — R16.0 LIVER MASS: Primary | ICD-10-CM

## 2023-12-06 ENCOUNTER — HOSPITAL ENCOUNTER (OUTPATIENT)
Age: 68
Discharge: HOME | End: 2023-12-09
Payer: MEDICARE

## 2023-12-06 DIAGNOSIS — R16.0 LIVER MASS: ICD-10-CM

## 2023-12-06 DIAGNOSIS — R74.8 ELEVATED ALKALINE PHOSPHATASE LEVEL: ICD-10-CM

## 2023-12-06 PROCEDURE — 74178 CT ABD&PLV WO CNTR FLWD CNTR: CPT

## 2023-12-06 PROCEDURE — 6360000004 HC RX CONTRAST MEDICATION: Performed by: RADIOLOGY

## 2023-12-06 RX ADMIN — IOPAMIDOL 100 ML: 755 INJECTION, SOLUTION INTRAVENOUS at 12:33

## 2023-12-07 ENCOUNTER — TRANSCRIBE ORDER (OUTPATIENT)
Dept: SCHEDULING | Age: 68
End: 2023-12-07

## 2023-12-07 ENCOUNTER — TRANSCRIBE ORDERS (OUTPATIENT)
Facility: HOSPITAL | Age: 68
End: 2023-12-07

## 2023-12-07 DIAGNOSIS — R16.0 LIVER MASS: Primary | ICD-10-CM

## 2023-12-08 ENCOUNTER — HOSPITAL ENCOUNTER (OUTPATIENT)
Facility: HOSPITAL | Age: 68
End: 2023-12-08
Payer: MEDICARE

## 2023-12-08 DIAGNOSIS — R16.0 LIVER MASS: ICD-10-CM

## 2023-12-08 PROCEDURE — 74183 MRI ABD W/O CNTR FLWD CNTR: CPT

## 2023-12-08 RX ORDER — GADOTERATE MEGLUMINE 376.9 MG/ML
17 INJECTION INTRAVENOUS
Status: COMPLETED | OUTPATIENT
Start: 2023-12-08 | End: 2023-12-08

## 2023-12-08 RX ADMIN — GADOTERATE MEGLUMINE 17 ML: 376.9 INJECTION INTRAVENOUS at 09:58

## 2024-03-04 RX ORDER — APIXABAN 5 MG/1
5 TABLET, FILM COATED ORAL 2 TIMES DAILY
Qty: 180 TABLET | Refills: 2 | Status: SHIPPED | OUTPATIENT
Start: 2024-03-04

## 2024-03-04 NOTE — TELEPHONE ENCOUNTER
Refill per VO of Dr. Francis  Last appt: 9/18/2023    Future Appointments   Date Time Provider Department Center   9/20/2024 10:00 AM Willy Francis MD [facility] AMB       Requested Prescriptions     Signed Prescriptions Disp Refills    ELIQUIS 5 MG TABS tablet 180 tablet 2     Sig: TAKE 1 TABLET BY MOUTH TWICE A DAY     Authorizing Provider: WILLY FRANCIS     Ordering User: DANIELLE NICHOLSON

## 2024-03-31 DIAGNOSIS — R60.0 LOCALIZED EDEMA: ICD-10-CM

## 2024-04-01 RX ORDER — FUROSEMIDE 20 MG/1
TABLET ORAL
Qty: 30 TABLET | OUTPATIENT
Start: 2024-04-01

## 2024-04-01 NOTE — TELEPHONE ENCOUNTER
Refill per VO of Dr. Francis  Last appt: 9/18/2023    Future Appointments   Date Time Provider Department Center   9/20/2024 10:00 AM Willy Francis MD [facility] AMB       Requested Prescriptions     Refused Prescriptions Disp Refills    furosemide (LASIX) 20 MG tablet [Pharmacy Med Name: FUROSEMIDE 20 MG TABLET] 30 tablet      Sig: TAKE 1 TABLET BY MOUTH DAILY AS NEEDED (LOWER EXTREMITY SWELLING).     Refused By: LISANDRO MCCLENDON     Reason for Refusal: Medication never prescribed for the patient     Has never been rx outpatient.

## 2024-04-10 ENCOUNTER — HOSPITAL ENCOUNTER (EMERGENCY)
Facility: HOSPITAL | Age: 69
Discharge: ANOTHER HOSPITAL | End: 2024-04-10
Attending: EMERGENCY MEDICINE
Payer: MEDICARE

## 2024-04-10 VITALS
TEMPERATURE: 98.6 F | SYSTOLIC BLOOD PRESSURE: 127 MMHG | HEIGHT: 66 IN | WEIGHT: 153.22 LBS | RESPIRATION RATE: 16 BRPM | OXYGEN SATURATION: 96 % | HEART RATE: 107 BPM | DIASTOLIC BLOOD PRESSURE: 70 MMHG | BODY MASS INDEX: 24.62 KG/M2

## 2024-04-10 DIAGNOSIS — R04.0 EPISTAXIS: ICD-10-CM

## 2024-04-10 DIAGNOSIS — D69.6 THROMBOCYTOPENIA (HCC): Primary | ICD-10-CM

## 2024-04-10 LAB
ALBUMIN SERPL-MCNC: 1.8 G/DL (ref 3.5–5)
ALBUMIN/GLOB SERPL: 0.4 (ref 1.1–2.2)
ALP SERPL-CCNC: 357 U/L (ref 45–117)
ALT SERPL-CCNC: 20 U/L (ref 12–78)
ANION GAP SERPL CALC-SCNC: 10 MMOL/L (ref 5–15)
AST SERPL-CCNC: 236 U/L (ref 15–37)
BASOPHILS # BLD: 0 K/UL (ref 0–0.1)
BASOPHILS NFR BLD: 0 % (ref 0–1)
BILIRUB SERPL-MCNC: 0.8 MG/DL (ref 0.2–1)
BUN SERPL-MCNC: 27 MG/DL (ref 6–20)
BUN/CREAT SERPL: 25 (ref 12–20)
CALCIUM SERPL-MCNC: 7.5 MG/DL (ref 8.5–10.1)
CHLORIDE SERPL-SCNC: 104 MMOL/L (ref 97–108)
CO2 SERPL-SCNC: 22 MMOL/L (ref 21–32)
CREAT SERPL-MCNC: 1.08 MG/DL (ref 0.55–1.02)
DIFFERENTIAL METHOD BLD: ABNORMAL
EKG ATRIAL RATE: 117 BPM
EKG DIAGNOSIS: NORMAL
EKG P AXIS: 71 DEGREES
EKG P-R INTERVAL: 150 MS
EKG Q-T INTERVAL: 320 MS
EKG QRS DURATION: 72 MS
EKG QTC CALCULATION (BAZETT): 446 MS
EKG R AXIS: 2 DEGREES
EKG T AXIS: 72 DEGREES
EKG VENTRICULAR RATE: 117 BPM
EOSINOPHIL # BLD: 0 K/UL (ref 0–0.4)
EOSINOPHIL NFR BLD: 0 % (ref 0–7)
ERYTHROCYTE [DISTWIDTH] IN BLOOD BY AUTOMATED COUNT: 20 % (ref 11.5–14.5)
GLOBULIN SER CALC-MCNC: 4.5 G/DL (ref 2–4)
GLUCOSE SERPL-MCNC: 85 MG/DL (ref 65–100)
HCT VFR BLD AUTO: 26 % (ref 35–47)
HGB BLD-MCNC: 8.3 G/DL (ref 11.5–16)
IMM GRANULOCYTES # BLD AUTO: 0 K/UL
IMM GRANULOCYTES NFR BLD AUTO: 0 %
INR PPP: 1.5 (ref 0.9–1.1)
LYMPHOCYTES # BLD: 3.1 K/UL (ref 0.8–3.5)
LYMPHOCYTES NFR BLD: 23 % (ref 12–49)
MCH RBC QN AUTO: 25 PG (ref 26–34)
MCHC RBC AUTO-ENTMCNC: 31.9 G/DL (ref 30–36.5)
MCV RBC AUTO: 78.3 FL (ref 80–99)
MONOCYTES # BLD: 0.4 K/UL (ref 0–1)
MONOCYTES NFR BLD: 3 % (ref 5–13)
NEUTS BAND NFR BLD MANUAL: 1 % (ref 0–6)
NEUTS SEG # BLD: 9.9 K/UL (ref 1.8–8)
NEUTS SEG NFR BLD: 73 % (ref 32–75)
NRBC # BLD: 0 K/UL (ref 0–0.01)
NRBC BLD-RTO: 0 PER 100 WBC
PLATELET # BLD AUTO: <3 K/UL (ref 150–400)
POTASSIUM SERPL-SCNC: 3.8 MMOL/L (ref 3.5–5.1)
PROT SERPL-MCNC: 6.3 G/DL (ref 6.4–8.2)
PROTHROMBIN TIME: 14.5 SEC (ref 9–11.1)
RBC # BLD AUTO: 3.32 M/UL (ref 3.8–5.2)
RBC MORPH BLD: ABNORMAL
SODIUM SERPL-SCNC: 136 MMOL/L (ref 136–145)
WBC # BLD AUTO: 13.4 K/UL (ref 3.6–11)

## 2024-04-10 PROCEDURE — 80053 COMPREHEN METABOLIC PANEL: CPT

## 2024-04-10 PROCEDURE — 85025 COMPLETE CBC W/AUTO DIFF WBC: CPT

## 2024-04-10 PROCEDURE — 93005 ELECTROCARDIOGRAM TRACING: CPT | Performed by: EMERGENCY MEDICINE

## 2024-04-10 PROCEDURE — 2580000003 HC RX 258: Performed by: EMERGENCY MEDICINE

## 2024-04-10 PROCEDURE — 85610 PROTHROMBIN TIME: CPT

## 2024-04-10 PROCEDURE — 96365 THER/PROPH/DIAG IV INF INIT: CPT

## 2024-04-10 PROCEDURE — 99285 EMERGENCY DEPT VISIT HI MDM: CPT

## 2024-04-10 PROCEDURE — 93010 ELECTROCARDIOGRAM REPORT: CPT | Performed by: STUDENT IN AN ORGANIZED HEALTH CARE EDUCATION/TRAINING PROGRAM

## 2024-04-10 PROCEDURE — 6360000002 HC RX W HCPCS: Performed by: EMERGENCY MEDICINE

## 2024-04-10 PROCEDURE — 36415 COLL VENOUS BLD VENIPUNCTURE: CPT

## 2024-04-10 RX ADMIN — DEXAMETHASONE SODIUM PHOSPHATE 40 MG: 4 INJECTION, SOLUTION INTRAMUSCULAR; INTRAVENOUS at 14:15

## 2024-04-10 ASSESSMENT — LIFESTYLE VARIABLES: HOW OFTEN DO YOU HAVE A DRINK CONTAINING ALCOHOL: NEVER

## 2024-04-10 NOTE — ED NOTES
TRANSFER - OUT REPORT:    Verbal report given to Transport service [facility]  christophe Lucas  being transferred to [facility] room 126 for routine progression of patient care       Report consisted of patient's Situation, Background, Assessment and   Recommendations(SBAR).     Information from the following report(s) ED SBAR and Recent Results was reviewed with the receiving nurse.    Ned Fall Assessment:    Presents to emergency department  because of falls (Syncope, seizure, or loss of consciousness): No  Age > 70: No  Altered Mental Status, Intoxication with alcohol or substance confusion (Disorientation, impaired judgment, poor safety awaremess, or inability to follow instructions): No  Impaired Mobility: Ambulates or transfers with assistive devices or assistance; Unable to ambulate or transer.: No  Nursing Judgement: No          Lines:   Peripheral IV 04/10/24 Left Antecubital (Active)   Site Assessment Clean, dry & intact 04/10/24 1452   Line Status Blood return noted 04/10/24 1452   Phlebitis Assessment No symptoms 04/10/24 1452   Infiltration Assessment 0 04/10/24 1452   Dressing Status New dressing applied 04/10/24 1452   Dressing Type Transparent 04/10/24 1452   Dressing Intervention New 04/10/24 1452        Opportunity for questions and clarification was provided.      Patient transported with:  PIV saline locked    updated, patient awake and alert, vss, packing in place and intact, no questions or concerns voiced at this time.

## 2024-04-10 NOTE — ED NOTES
Purposeful rounding done. Patient sitting on stretcher. Offered assistance with any needs. No further needs ATT.  Call bell within reach and educated on use.

## 2024-04-10 NOTE — ED TRIAGE NOTES
Pt reports right sided nare epistaxis for about 24 hours. Pt has been off Eliquis for a week, has been taking Lovenox.

## 2024-04-10 NOTE — ED PROVIDER NOTES
[facility] EMERGENCY DEPT  EMERGENCY DEPARTMENT ENCOUNTER      Pt Name: Brittany Lucas  MRN: [id number]  Birthdate 1955  Date of evaluation: 4/10/2024  Provider: Partha Tenorio MD      HISTORY OF PRESENT ILLNESS      HPI  69-year-old female with a past medical history of atrial fibrillation and PE currently on Lovenox as well as hepatocellular carcinoma presenting to the emergency department due to epistaxis.  Last night patient developed epistaxis from her right naris and has been unable to get it stopped.  She says she did pick her nose prior to this starting but does not feel like she sustained much trauma during that event.  She denies any nasal pain.  No falls.  Denies bleeding elsewhere or history of nosebleeds.      Nursing Notes were reviewed.    REVIEW OF SYSTEMS         Review of Systems  All systems reviewed were negative unless otherwise documented in HPI      PAST MEDICAL HISTORY     Past Medical History:   Diagnosis Date    Anxiety     Arthritis     Cancer (HCC)     BCC    COVID-19 vaccine administered     boostered   Pfizer    History of hip replacement     1/31/22    PAF (paroxysmal atrial fibrillation) (HCC)     Perforated gastric ulcer (HCC)     2/4/22    Pulmonary embolism (HCC)     2/10/22    Thyroid disease     left thyroid nodule    White coat syndrome without diagnosis of hypertension          SURGICAL HISTORY       Past Surgical History:   Procedure Laterality Date    ABDOMEN SURGERY  02/04/2022    Laparoscopic to open lysis of adhesions and repair of gastric perforation    COLONOSCOPY  09/2021    CT VISCERAL PERCUTANEOUS DRAIN  02/14/2022    CT VISCERAL PERCUTANEOUS DRAIN 2/14/2022 [facility] RAD CT    DILATION AND CURETTAGE OF UTERUS  1990    THYROIDECTOMY Left 11/9/2023    LEFT THYROID LOBECTOMY (GENERAL ET) performed by Jossue Almonte MD at [facility] AMBULATORY OR    TOTAL HIP ARTHROPLASTY Right 01/31/2022    WISDOM TOOTH EXTRACTION           CURRENT MEDICATIONS       Previous Medications

## 2024-04-10 NOTE — ED NOTES
Attempted to call SBAR report at this time. RN unable to take report at this time.  Call back number given and advised them [facility] will be getting patient at 2000.

## 2024-04-10 NOTE — ED NOTES
[facility] transfer center phone call received by this nurse.  [facility] will be going to [facility].  2nd floor room 126.    Number for report is [phone]      [facility] transfer center requests update for ETA to this number: [phone]

## 2024-04-10 NOTE — ED NOTES
Vitals updated, patient updated on transfer to [facility] vs. [facility].  Patient and patient's  in understanding.  Patient asks to not be hooked to monitor. This RN agrees, but patient understands intermittent vitals.    No further needs ATT.

## 2024-04-11 LAB — PATH REV BLD -IMP: NORMAL

## 2024-04-11 NOTE — ED NOTES
TRANSFER - OUT REPORT:    Verbal report given to TANESHA Hurtado on Brittany Lucas  being [facility] for routine progression of patient care       Report consisted of patient's Situation, Background, Assessment and   Recommendations(SBAR).     Information from the following report(s) Nurse Handoff Report, ED Encounter Summary, and ED SBAR was reviewed with the receiving nurse.           Lines:   Peripheral IV 04/10/24 Left Antecubital (Active)   Site Assessment Clean, dry & intact 04/10/24 1452   Line Status Blood return noted 04/10/24 1452   Phlebitis Assessment No symptoms 04/10/24 1452   Infiltration Assessment 0 04/10/24 1452   Dressing Status New dressing applied 04/10/24 1452   Dressing Type Transparent 04/10/24 1452   Dressing Intervention New 04/10/24 1452        Opportunity for questions and clarification was provided.      Patient transported with:  Monitor

## 2024-05-14 NOTE — PROGRESS NOTES
05/14/24 0730   Appointment Info   Signing Clinician's Name / Credentials (OT) Hyacinth Mroales OTR/L   Quick Adds   Quick Adds Certification   Living Environment   People in Home spouse   Current Living Arrangements house;other (see comments)  (split entry.  Will stay on the upper level.)   Living Environment Comments WIS with GB and shower chair.  RTS with Vanity on the R   Self-Care   Usual Activity Tolerance good   Current Activity Tolerance good   Instrumental Activities of Daily Living (IADL)   IADL Comments Spouse able to assist with IADLs   General Information   Onset of Illness/Injury or Date of Surgery 05/13/24   Referring Physician Dr. Balaji Oden   Patient/Family Therapy Goal Statement (OT) To return home with family   Additional Occupational Profile Info/Pertinent History of Current Problem Adm for L JAREK.  PMH:HTN, Dyslip, PAC, Hyperlip.   Existing Precautions/Restrictions no known precautions/restrictions  (Direct Anterior)   Left Lower Extremity (Weight-bearing Status) weight-bearing as tolerated (WBAT)   Cognitive Status Examination   Follows Commands WNL   Visual Perception   Visual Impairment/Limitations corrective lenses full-time   Bed Mobility   Bed Mobility rolling right;supine-sit;sit-supine   Rolling Right [place] (Bed Mobility) supervision;verbal cues   Supine-Sit [place] (Bed Mobility) supervision;verbal cues   Sit-Supine [place] (Bed Mobility) supervision;verbal cues   Transfers   Transfers bed-chair transfer;sit-stand transfer;toilet transfer   Transfer Skill: Bed to Chair/Chair to Bed   Bed-Chair Collier (Transfers) supervision;verbal cues   Assistive Device (Bed-Chair Transfers) rolling walker   Sit-Stand Transfer   Sit-Stand Collier (Transfers) supervision;verbal cues   Assistive Device (Sit-Stand Transfers) walker, front-wheeled   Toilet Transfer   Type (Toilet Transfer) sit-stand;stand-sit   [place]lier Level (Toilet Transfer) supervision;verbal cues  Cancer [facility] [address]  [address] [facility], [address] [place] [address]  [facility] S[facility]: [phone]  F: [phone] Patient ID  Name: Susan Tijerina  YOB: 1955  MRN: [id number]  Referring Provider:   No referring provider defined for this encounter. Primary Care Provider:   Dejah Holden MD       HEMATOLOGY/MEDICAL ONCOLOGY  NOTE   Date of Visit: 08/10/22  Reason for Evaluation:     Chief Complaint   Patient presents with    Follow-up     Follow up          Subjective:     History of Present Illness:     Danyel Huston is a 75 y.o. F who presents for a follow-up evaluation for pulmonary embolism. Patient overall reports feeling improved. She has completed her physical therapy. She had an EGD about 2 weeks ago. She reports that her hip has substantially healed. She is taking Eliquis now for Atrial Fibrillation. She tells me that her hemoglobin was normal back in May with her primary provider. She will see PCP in November again.  -      Past Medical History:   Diagnosis Date    Anxiety     COVID-19 vaccine administered     boostered   Pfizer    History of hip replacement     1/31/22    PAF (paroxysmal atrial fibrillation) (HCC)     Perforated gastric ulcer ([address]     2/4/22    Pulmonary embolism ([facility] [address]     2/10/22    White coat syndrome without diagnosis of hypertension       Past Surgical History:   Procedure Laterality Date    HX COLONOSCOPY  09/2021    HX DILATION AND CURETTAGE  1990    HX ORTHOPAEDIC  01/31/2022    right hip replaced       Social History     Tobacco Use    Smoking status: Never    Smokeless tobacco: Never   Substance Use Topics    Alcohol use: Not Currently     Comment: social      Family History   Problem Relation Age of Onset    Heart Disease Mother     Heart Disease Father     Clotting Disorder Neg Hx      Current Outpatient Medications   Medication Sig    escitalopram oxalate (LEXAPRO) 10 mg tablet Take 10 mg by mouth daily.     dilTIAZem ER   Assistive Device (Toilet Transfer) raised toilet seat;grab bars/safety frame   Balance   Balance Assessment standing dynamic balance   Standing Balance: Dynamic supervision;verbal cues   Position/Device Used, Standing Balance walker, front-wheeled   Activities of Daily Living   BADL Assessment/Intervention upper body dressing;lower body dressing   Upper Body Dressing Assessment/Training   Position (Upper Body Dressing) supported sitting   [place] Level (Upper Body Dressing) upper body dressing skills;don;pull-over garment;independent   Lower Body Dressing Assessment/Training   Position (Lower Body Dressing) supported sitting;supported standing   [place] Level (Lower Body Dressing) lower body dressing skills;don;pants/bottoms;shoes/slippers;socks;supervision;verbal cues   Clinical Impression   Criteria for Skilled Therapeutic Interventions Met (OT) Yes, treatment indicated   OT Diagnosis Decreased indep with ADLs and trsfs due to L JAREK   OT Problem List-Impairments impacting ADL problems related to;mobility   Assessment of Occupational Performance 3-5 Performance Deficits   Identified Performance Deficits Dressing, toileting, bathing, G/H and trsfs   Planned Therapy Interventions (OT) ADL retraining   Clinical Decision Making Complexity (OT) detailed assessment/moderate complexity   Risk & Benefits of therapy have been explained evaluation/treatment results reviewed;participants included;patient   OT Total Evaluation Time   OT Eval, Moderate Complexity Minutes (94564) 15   Therapy Certification   Medical Diagnosis L JAREK   Start of Care Date 05/14/24   Certification date from 05/14/24   Certification date to 06/14/24   OT Goals   Therapy Frequency (OT) One time eval and treatment   OT Goals Lower Body Dressing;Transfers   OT: Lower Body Dressing Modified independent;using adaptive equipment;within precautions;Completed   OT: Transfer Modified independent;with assistive device;within precautions;Completed  (CARDIZEM CD) 240 mg capsule Take 1 Capsule by mouth daily. Eliquis 5 mg tablet Take 1 Tablet by mouth two (2) times a day. multivitamin (ONE A DAY) tablet Take 1 Tablet by mouth daily. rosuvastatin (CRESTOR) 5 mg tablet Take 1 Tablet by mouth nightly. (Patient not taking: Reported on 8/10/2022)    pantoprazole (PROTONIX) 40 mg tablet Take 40 mg by mouth daily. (Patient not taking: Reported on 8/10/2022)     No current facility-administered medications for this visit. No Known Allergies     Review of Systems Provided by:  Patient  Review of Systems: A complete review of systems was obtained, reviewed. Pertinent findings reviewed above. Objective:     Visit Vitals  /74   Pulse 94   Temp 98 °F (36.7 °C) (Oral)   Resp 18   Ht 5' 5\" (1.651 m)   Wt 189 lb (85.7 kg)   SpO2 97%   BMI 31.45 kg/m²       2- Ambulatory and capable of all selfcare but unable to carry out any work activities; up and about more than 50% of waking hours. Physical Exam  Constitutional: No acute distress. and Non-toxic appearance. HENT: Normocephalic and atraumatic head. Eyes: Normal Conjunctivae. Anicteric sclerae. Cardiovascular: S1,S2 auscultated. No pitting edema. Pulmonary: Normal Respiratory Effort. No wheezing. No rhonchi. Abdominal: Normal bowel sounds. Soft Abdomen to palpation. No abdominal tenderness. Skin: No jaundice. No rash. Musculoskeletal: No muscle pain on palpation. No temporal muscle wasting on inspection. Neurological: Alert and oriented. No tremor on inspection. Normal Gait. Psychiatric: mood normal. normal speech rate normal affect    Results:     Lab Results   Component Value Date/Time    WBC 9.5 05/25/2022 12:16 AM    HGB 11.4 (L) 05/25/2022 12:16 AM    HCT 34.3 (L) 05/25/2022 12:16 AM    PLATELET 135 29/99/3893 12:16 AM    MCV 86.0 05/25/2022 12:16 AM    ABS.  NEUTROPHILS 5.4 05/25/2022 12:16 AM     Lab Results   Component Value Date/Time    Sodium 139 05/25/2022 12:16 AM   Interventions   Interventions Quick Adds Self-Care/Home Management   Self-Care/Home Management   Self-Care/Home Mgmt/ADL, Compensatory, Meal Prep Minutes (05761) 30   Symptoms Noted During/After Treatment (Meal Preparation/Planning Training) none   Treatment Detail/Skilled Intervention Pt sitting in chair when therapist arrived. Reviewed with Pt the JAREK precautions - No precautions. Pt  verbalized understanding. Worked on FB dressing. Pt was indep with U/B dressing while sitting. Worked on L/B dressing with SBA for VC for sequencing and use of AE - reacher. By end of session, Pt was mod indep with L/B dressing. Worked on room trsfs using the FWW to the bed, chair, toilet, WIS, BR sink. Initially needing SBA for VC for correct hand placement. By end of session, Pt was mod indep using FWW. Pt was shown and practiced bed mobility using pillows between legs when resting on thier R side. Therapist demo the car trsf and kitchen mobility. Pt and spouse understood. At end of session, Pt was sitting in the chair in bed with alarm on and call light within reach. RN aware.   OT Discharge Planning   OT Plan D/C OT   OT Discharge Recommendation (DC Rec) other (see comments)   OT Rationale for DC Rec Pt will have assist from family at home.   OT Brief overview of current status Pt has met his OT goals.  Mod indep with trsfs and ADLs   Total Session Time   Timed Code Treatment Minutes 30   Total Session Time (sum of timed and untimed services) 45   .ip   Potassium 3.6 05/25/2022 12:16 AM    Chloride 108 05/25/2022 12:16 AM    CO2 23 05/25/2022 12:16 AM    Glucose 100 05/25/2022 12:16 AM    BUN 19 05/25/2022 12:16 AM    Creatinine 0.74 05/25/2022 12:16 AM    GFR est AA >60 05/25/2022 12:16 AM    GFR est non-AA >60 05/25/2022 12:16 AM    Calcium 9.0 05/25/2022 12:16 AM    Glucose (POC) 113 02/13/2022 12:31 PM     Lab Results   Component Value Date/Time    Bilirubin, total 0.4 05/25/2022 12:16 AM    ALT (SGPT) 23 05/25/2022 12:16 AM    Alk. phosphatase 150 (H) 05/25/2022 12:16 AM    Protein, total 7.2 05/25/2022 12:16 AM    Albumin 3.1 (L) 05/25/2022 12:16 AM    Globulin 4.1 (H) 05/25/2022 12:16 AM       SPEP:  No results found for: PE6T No results found for: PE9T  No results found for: KLFL3L No results found for: KLFL1L No components found for: KLFL2  Reticulocyte Count: No results found for: RET  Bilirubin:   Lab Results   Component Value Date/Time    Bilirubin, total 0.4 05/25/2022 12:16 AM    Bilirubin Negative 01/24/2022 11:13 AM     LDH:   LD   Date Value Ref Range Status   02/11/2022 284 (H) 81 - 246 U/L Final     Haptoglobin:  Haptoglobin   Date Value Ref Range Status   02/11/2022 400 (H) 30 - 200 mg/dL Final        Assessment and Recommendations:     1. Other acute pulmonary embolism without acute cor pulmonale ([address]  -she reportedly was recommended to be on anticoagulation for atrial fibrillation. Since her clot seemed provoked we will discharge her from our clinic but she will remain on anticoagulation under the care of her cardiology providers. 2. High risk medication use  Continues on eliquis. She continues to avoid NSAID's with her prior history. 3. Normocytic anemia  -Hemoglobin remains almost normal. I would recommend that she have her iron levels rechecked in primary care. FOLLOW-UP:  Follow-up and Dispositions    Return if symptoms worsen or fail to improve.            Amna Contreras MD  Hematology/Medical Oncology Provider  [address] [place] [address] [address] [place] [address]  P: [phone]         Signed By:   Jose Alberto Smith MD

## 2024-07-29 RX ORDER — FUROSEMIDE 20 MG/1
TABLET ORAL
Qty: 30 TABLET | OUTPATIENT
Start: 2024-07-29

## 2024-07-29 NOTE — TELEPHONE ENCOUNTER
Refill per VO of Dr. Francis  Last appt: 9/18/2023    Future Appointments   Date Time Provider Department Center   9/20/2024 10:00 AM Willy Francis MD [facility] AMB       Requested Prescriptions     Refused Prescriptions Disp Refills    furosemide (LASIX) 20 MG tablet [Pharmacy Med Name: FUROSEMIDE 20 MG TABLET] 30 tablet      Sig: TAKE 1 TABLET BY MOUTH DAILY AS NEEDED (LOWER EXTREMITY SWELLING).     Refused By: AMARILYS MERIDA     Reason for Refusal: Medication never prescribed for the patient     NEVER Prescribed for patient

## (undated) DEVICE — GLOVE ORANGE PI 7 1/2   MSG9075

## (undated) DEVICE — DUAL IRRIGATION ADAPTOR

## (undated) DEVICE — MINOR ENT-SFMCASU: Brand: MEDLINE INDUSTRIES, INC.

## (undated) DEVICE — TROCAR: Brand: KII® SLEEVE

## (undated) DEVICE — TRANSFER SET 3": Brand: MEDLINE INDUSTRIES, INC.

## (undated) DEVICE — TROCAR: Brand: KII® OPTICAL ACCESS SYSTEM

## (undated) DEVICE — SHEET, DRAPE, SPLIT, STERILE: Brand: MEDLINE

## (undated) DEVICE — GLOVE ORANGE PI 8 1/2   MSG9085

## (undated) DEVICE — SOL IRRIGATION INJ NACL 0.9% 500ML BTL

## (undated) DEVICE — SUTURE MCRYL SZ 3-0 L27IN ABSRB UD L24MM PS-1 3/8 CIR PRIM Y936H

## (undated) DEVICE — CANISTER, RIGID, 3000CC: Brand: MEDLINE INDUSTRIES, INC.

## (undated) DEVICE — GLOVE SURG SZ 6 THK91MIL LTX FREE SYN POLYISOPRENE ANTI

## (undated) DEVICE — TOTAL JOINT-SFMC: Brand: MEDLINE INDUSTRIES, INC.

## (undated) DEVICE — Device

## (undated) DEVICE — 4-PORT MANIFOLD: Brand: NEPTUNE 2

## (undated) DEVICE — PENCIL ES CRD L10FT HND SWCHING ROCK SWCH W/ EDGE COAT BLDE

## (undated) DEVICE — DERMABOND SKIN ADH 0.7ML -- DERMABOND ADVANCED 12/BX

## (undated) DEVICE — MICRODISSECTION NEEDLE STRAIGHT SLEEVE: Brand: COLORADO

## (undated) DEVICE — BLUNTFILL: Brand: MONOJECT

## (undated) DEVICE — SUTURE SZ 0 27IN 5/8 CIR UR-6  TAPER PT VIOLET ABSRB VICRYL J603H

## (undated) DEVICE — HOOD: Brand: FLYTE

## (undated) DEVICE — PAD NON-ADHERENT 3X4 STRL LF --

## (undated) DEVICE — 3M™ TEGADERM™ TRANSPARENT FILM DRESSING FRAME STYLE, 1624W, 2-3/8 IN X 2-3/4 IN (6 CM X 7 CM), 100/CT 4CT/CASE: Brand: 3M™ TEGADERM™

## (undated) DEVICE — DRESSING FOAM W4XL10IN AG SIL ADH ANTIMIC POSTOP OPTIFOAM

## (undated) DEVICE — SOLUTION IRRIG 3000ML 0.9% SOD CHL USP UROMATIC PLAS CONT

## (undated) DEVICE — SUTURE STRATAFIX SYMMETRIC SZ 1 L18IN ABSRB VLT CT1 L36CM SXPP1A404

## (undated) DEVICE — SPONGE: SPECIALTY PEANUT XR 100/CS: Brand: MEDICAL ACTION INDUSTRIES

## (undated) DEVICE — INSUFFLATION NEEDLE: Brand: SURGINEEDLE

## (undated) DEVICE — SUTURE MCRYL SZ 4-0 L27IN ABSRB UD L19MM PS-2 1/2 CIR PRIM Y426H

## (undated) DEVICE — GLOVE SURG SZ 9 L12IN FNGR THK79MIL GRN LTX FREE

## (undated) DEVICE — KIT TRK HIP PROC VIZADISC

## (undated) DEVICE — SYR LR LCK 1ML GRAD NSAF 30ML --

## (undated) DEVICE — SUTURE CHROMIC GUT SZ 3-0 L27IN ABSRB BRN L19MM PS-2 3/8 1638H

## (undated) DEVICE — PREP KIT PEEL PTCH POVIDONE IOD

## (undated) DEVICE — HAR9FM @HARMONIC FOCUS+ SHEARS, W/O ADAP: Brand: MEDLINE

## (undated) DEVICE — KIT POS FOAM HANA TBL

## (undated) DEVICE — GENERAL LAPAROSCOPY-SFMC: Brand: MEDLINE INDUSTRIES, INC.

## (undated) DEVICE — GLOVE SURG SZ 85 L12IN FNGR ORTHO 126MIL CRM LTX FREE

## (undated) DEVICE — GOWN,SIRUS,NONRNF,SETINSLV,2XL,18/CS: Brand: MEDLINE

## (undated) DEVICE — GLOVE SURG SZ 85 L12IN FNGR THK79MIL GRN LTX FREE

## (undated) DEVICE — STRYKER PERFORMANCE SERIES SAGITTAL BLADE: Brand: STRYKER PERFORMANCE SERIES

## (undated) DEVICE — SUTURE VCRL + SZ 1-0 L36IN ABSRB UD CTX 1/2 CIR TAPR PNT VCP977H

## (undated) DEVICE — SOLUTION IRRIG 1000ML 0.9% SOD CHL USP POUR PLAS BTL

## (undated) DEVICE — KIT DRP FOR RIO ROBOTIC ARM ASST SYS

## (undated) DEVICE — SUTURE VCRL SZ 4-0 L27IN ABSRB UD L19MM PS-2 3/8 CIR PRIM J426H

## (undated) DEVICE — 3M™ TEGADERM™ TRANSPARENT FILM DRESSING FRAME STYLE, 1626W, 4 IN X 4-3/4 IN (10 CM X 12 CM), 50/CT 4CT/CASE: Brand: 3M™ TEGADERM™

## (undated) DEVICE — 3M™ STERI-DRAPE™ U-DRAPE 1015: Brand: STERI-DRAPE™

## (undated) DEVICE — LAPAROSCOPIC TROCAR SLEEVE/SINGLE USE: Brand: KII® OPTICAL ACCESS SYSTEM

## (undated) DEVICE — SPONGE GZ W4XL4IN COT 12 PLY TYP VII WVN C FLD DSGN

## (undated) DEVICE — TOWEL SURG W17XL27IN STD BLU COT NONFENESTRATED PREWASHED

## (undated) DEVICE — SPONGE GZ W4XL4IN COT RADPQ HIGHLY ABSRB

## (undated) DEVICE — PIN FIX 4X170MM STRL -- 2/PK MAKO

## (undated) DEVICE — LIQUIBAND RAPID ADHESIVE 36/CS 0.8ML: Brand: MEDLINE

## (undated) DEVICE — SOLUTION IRRIG 1000ML STRL H2O USP PLAS POUR BTL

## (undated) DEVICE — SUTURE VCRL SZ 2-0 L36IN ABSRB UD L36MM CT-1 1/2 CIR J945H

## (undated) DEVICE — ELECTRODE BLDE L4IN NONINSULATED EDGE

## (undated) DEVICE — 6619 2 PTNT ISO SYS INCISE AREA&LT;(&GT;&&LT;)&GT;P: Brand: STERI-DRAPE™ IOBAN™ 2

## (undated) DEVICE — MARKER RAD KNEE TIB CKPT STEREOTAXIC IMAG LESION LOC

## (undated) DEVICE — Device: Brand: JELCO

## (undated) DEVICE — CLIP LIG M BLU TI HRT SHP WIRE HORZ 600 PER BX